# Patient Record
Sex: MALE | Race: WHITE | NOT HISPANIC OR LATINO | Employment: FULL TIME | URBAN - METROPOLITAN AREA
[De-identification: names, ages, dates, MRNs, and addresses within clinical notes are randomized per-mention and may not be internally consistent; named-entity substitution may affect disease eponyms.]

---

## 2017-03-10 DIAGNOSIS — R05.9 COUGH: Primary | ICD-10-CM

## 2017-03-10 RX ORDER — HYDROCODONE BITARTRATE AND HOMATROPINE METHYLBROMIDE ORAL SOLUTION 5; 1.5 MG/5ML; MG/5ML
LIQUID ORAL
Qty: 250 ML | Refills: 0 | Status: SHIPPED | OUTPATIENT
Start: 2017-03-10 | End: 2017-04-13 | Stop reason: SDUPTHER

## 2017-03-31 ENCOUNTER — HOSPITAL ENCOUNTER (OUTPATIENT)
Dept: RADIOLOGY | Facility: HOSPITAL | Age: 52
Discharge: HOME | End: 2017-03-31
Attending: INTERNAL MEDICINE
Payer: COMMERCIAL

## 2017-03-31 ENCOUNTER — OFFICE VISIT (OUTPATIENT)
Dept: INTERNAL MEDICINE | Facility: CLINIC | Age: 52
End: 2017-03-31
Payer: COMMERCIAL

## 2017-03-31 VITALS
SYSTOLIC BLOOD PRESSURE: 130 MMHG | HEIGHT: 71 IN | HEART RATE: 68 BPM | DIASTOLIC BLOOD PRESSURE: 78 MMHG | WEIGHT: 256.38 LBS | BODY MASS INDEX: 35.89 KG/M2

## 2017-03-31 DIAGNOSIS — R05.8 ALLERGIC COUGH: ICD-10-CM

## 2017-03-31 DIAGNOSIS — R05.8 ALLERGIC COUGH: Primary | ICD-10-CM

## 2017-03-31 PROCEDURE — 71020 XR CHEST PA AND LATERAL: CPT | Mod: TC,PO

## 2017-03-31 PROCEDURE — 71020 XR CHEST PA AND LATERAL: CPT | Mod: 26,,, | Performed by: RADIOLOGY

## 2017-03-31 PROCEDURE — 99214 OFFICE O/P EST MOD 30 MIN: CPT | Mod: S$GLB,,, | Performed by: INTERNAL MEDICINE

## 2017-03-31 PROCEDURE — 99999 PR PBB SHADOW E&M-EST. PATIENT-LVL III: CPT | Mod: PBBFAC,,, | Performed by: INTERNAL MEDICINE

## 2017-03-31 RX ORDER — PREDNISONE 20 MG/1
TABLET ORAL
Qty: 18 TABLET | Refills: 0 | Status: SHIPPED | OUTPATIENT
Start: 2017-03-31 | End: 2017-04-28

## 2017-03-31 NOTE — PROGRESS NOTES
REASON FOR VISIT:  The patient is a 51-year-old male who for about five weeks   has been having a cough.  The cough is actually nonproductive.  He has not been   sick, maybe some nasal congestion at times for which he takes Zyrtec, but there   is no shortness of breath or wheezing, heartburn, ingestion, sore throat, ear   pain or fever.  It will be a little bit more prominent when he exerts himself or   when lying down.  It does not come on with eating.  He had a rash in January 2016.  He was seen for bronchial condition with persistent cough and prednisone   was given.    PAST MEDICAL HISTORY:  No major health conditions.    MEDICATIONS:  Actually, he does have a prescription for Hycodan right now to use   to help with the cough.    PHYSICAL EXAMINATION:  VITAL SIGNS:  Per EPIC.  HEENT:  Tympanic membranes normal.  Nasal mucosa is clear.  Oropharynx, no   abnormal findings.  LUNGS:  Clear breath sounds with inspiration and expiration.  HEART:  Regular rate and rhythm.    IMPRESSION:  Allergic cough.    PLAN:    Chest x-ray and prednisone over nine days.    If there is no improvement, we may need to consider inhaler.  Referral to Pulmonology.        /tata [id number] review        IKER/JERARDO  dd: 03/31/2017 16:10:09 (CDT)  td: 04/01/2017 12:57:07 (CDT)  Doc ID   #[id number]  Job ID #[id number]    CC:

## 2017-03-31 NOTE — MR AVS SNAPSHOT
[facility] [address]  [address]  [address]  [facility] [address]  Phone: [phone]                  Naldo Barakat   3/31/2017 3:00 PM   Office Visit    Description:  Male : 1965   Provider:  Anthony Wyman MD   Department:  [facility] [address]           Reason for Visit     Cough           Diagnoses this Visit        Comments    Allergic cough    -  Primary            To Do List           Goals (5 Years of Data)     None       These Medications        Disp Refills Start End    predniSONE (DELTASONE) 20 MG tablet 18 tablet 0 3/31/2017     3 tablets po daily for 3 days, then 2 tablets po daily for 3 days, then 1 tablets po daily for 3 days    Pharmacy: [facility] - [place], [place] - [address] Ph #: [phone]         [facility]     [facility] Nurse Care Line -  Assistance  Unless otherwise directed by your provider, please contact [facility], our nurse care line that is available for  assistance.     Registered nurses in the [facility] Center provide: appointment scheduling, clinical advisement, health education, and other advisory services.  Call: [phone] (toll free)               Medications           Message regarding Medications     Verify the changes and/or additions to your medication regime listed below are the same as discussed with your clinician today.  If any of these changes or additions are incorrect, please notify your healthcare provider.        START taking these NEW medications        Refills    predniSONE (DELTASONE) 20 MG tablet 0    Sig: 3 tablets po daily for 3 days, then 2 tablets po daily for 3 days, then 1 tablets po daily for 3 days    Class: Normal           Verify that the below list of medications is an accurate representation of the medications you are currently taking.  If none reported, the list may be blank. If incorrect, please contact your  "healthcare provider. Carry this list with you in case of emergency.           Current Medications     hydrocodone-homatropine 5-1.5 mg/5 ml (HYCODAN) 5-1.5 mg/5 mL Syrp Take one or two teaspoons every four hours for cough    clobetasol (TEMOVATE) 0.05 % cream Apply topically 2 (two) times daily. On rash    hydrOXYzine HCl (ATARAX) 25 MG tablet Take 1 tablet (25 mg total) by mouth every evening.    nystatin-triamcinolone (MYCOLOG II) cream Apply topically 2 (two) times daily.    predniSONE (DELTASONE) 20 MG tablet 3 tablets po daily for 3 days, then 2 tablets po daily for 3 days, then 1 tablets po daily for 3 days           Clinical Reference Information           Your Vitals Were     BP Pulse Height Weight BMI    130/78 68 5' 11" (1.803 m) 116.3 kg (256 lb 6.4 oz) 35.76 kg/m2      Blood Pressure          Most Recent Value    BP  130/78      Allergies as of 3/31/2017     Erythromycin      Immunizations Administered on Date of Encounter - 3/31/2017     None      Orders Placed During Today's Visit     Future Labs/Procedures Expected by Expires    X-Ray Chest PA And Lateral  3/31/2017 3/31/2018      Language Assistance Services     ATTENTION: Language assistance services are available, free of charge. Please call [phone].      ATENCIÓN: Si habla carissa, tiene a burgess disposición servicios gratuitos de asistencia lingüística. Llame al [phone].     CHÚ Ý: N?u b?n nói Ti?ng Vi?t, có các d?ch v? h? tr? ngôn ng? mi?n phí dành cho b?n. G?i s? [phone].         [facility] [address] complies with applicable Federal civil rights laws and does not discriminate on the basis of race, color, national origin, age, disability, or sex.        "

## 2017-04-13 ENCOUNTER — PATIENT MESSAGE (OUTPATIENT)
Dept: INTERNAL MEDICINE | Facility: CLINIC | Age: 52
End: 2017-04-13

## 2017-04-13 DIAGNOSIS — R05.9 COUGH: ICD-10-CM

## 2017-04-13 RX ORDER — HYDROCODONE BITARTRATE AND HOMATROPINE METHYLBROMIDE ORAL SOLUTION 5; 1.5 MG/5ML; MG/5ML
LIQUID ORAL
Qty: 250 ML | Refills: 0 | Status: SHIPPED | OUTPATIENT
Start: 2017-04-13 | End: 2017-04-28

## 2017-04-19 ENCOUNTER — PATIENT MESSAGE (OUTPATIENT)
Dept: INTERNAL MEDICINE | Facility: CLINIC | Age: 52
End: 2017-04-19

## 2017-04-19 DIAGNOSIS — Z00.00 ANNUAL PHYSICAL EXAM: Primary | ICD-10-CM

## 2017-04-19 DIAGNOSIS — Z12.5 SCREENING FOR PROSTATE CANCER: ICD-10-CM

## 2017-04-21 ENCOUNTER — LAB VISIT (OUTPATIENT)
Dept: LAB | Facility: HOSPITAL | Age: 52
End: 2017-04-21
Attending: INTERNAL MEDICINE
Payer: COMMERCIAL

## 2017-04-21 DIAGNOSIS — Z00.00 ANNUAL PHYSICAL EXAM: ICD-10-CM

## 2017-04-21 DIAGNOSIS — Z12.5 SCREENING FOR PROSTATE CANCER: ICD-10-CM

## 2017-04-21 LAB
ALBUMIN SERPL BCP-MCNC: 4.1 G/DL
ALP SERPL-CCNC: 69 U/L
ALT SERPL W/O P-5'-P-CCNC: 28 U/L
ANION GAP SERPL CALC-SCNC: 6 MMOL/L
AST SERPL-CCNC: 30 U/L
BASOPHILS # BLD AUTO: 0.02 K/UL
BASOPHILS NFR BLD: 0.4 %
BILIRUB SERPL-MCNC: 1 MG/DL
BUN SERPL-MCNC: 17 MG/DL
CALCIUM SERPL-MCNC: 9.7 MG/DL
CHLORIDE SERPL-SCNC: 107 MMOL/L
CHOLEST/HDLC SERPL: 4.5 {RATIO}
CO2 SERPL-SCNC: 28 MMOL/L
COMPLEXED PSA SERPL-MCNC: 0.87 NG/ML
CREAT SERPL-MCNC: 1.2 MG/DL
DIFFERENTIAL METHOD: ABNORMAL
EOSINOPHIL # BLD AUTO: 0.1 K/UL
EOSINOPHIL NFR BLD: 1.7 %
ERYTHROCYTE [DISTWIDTH] IN BLOOD BY AUTOMATED COUNT: 12.8 %
EST. GFR  (AFRICAN AMERICAN): >60 ML/MIN/1.73 M^2
EST. GFR  (NON AFRICAN AMERICAN): >60 ML/MIN/1.73 M^2
GLUCOSE SERPL-MCNC: 99 MG/DL
HCT VFR BLD AUTO: 41.6 %
HDL/CHOLESTEROL RATIO: 22.2 %
HDLC SERPL-MCNC: 207 MG/DL
HDLC SERPL-MCNC: 46 MG/DL
HGB BLD-MCNC: 14.8 G/DL
LDLC SERPL CALC-MCNC: 144.8 MG/DL
LYMPHOCYTES # BLD AUTO: 1.6 K/UL
LYMPHOCYTES NFR BLD: 34.6 %
MCH RBC QN AUTO: 33.2 PG
MCHC RBC AUTO-ENTMCNC: 35.6 %
MCV RBC AUTO: 93 FL
MONOCYTES # BLD AUTO: 0.3 K/UL
MONOCYTES NFR BLD: 6.3 %
NEUTROPHILS # BLD AUTO: 2.6 K/UL
NEUTROPHILS NFR BLD: 57 %
NONHDLC SERPL-MCNC: 161 MG/DL
PLATELET # BLD AUTO: 229 K/UL
PMV BLD AUTO: 9.7 FL
POTASSIUM SERPL-SCNC: 4.6 MMOL/L
PROT SERPL-MCNC: 7.2 G/DL
RBC # BLD AUTO: 4.46 M/UL
SODIUM SERPL-SCNC: 141 MMOL/L
TRIGL SERPL-MCNC: 81 MG/DL
TSH SERPL DL<=0.005 MIU/L-ACNC: 1.18 UIU/ML
WBC # BLD AUTO: 4.62 K/UL

## 2017-04-21 PROCEDURE — 85025 COMPLETE CBC W/AUTO DIFF WBC: CPT

## 2017-04-21 PROCEDURE — 80061 LIPID PANEL: CPT

## 2017-04-21 PROCEDURE — 80053 COMPREHEN METABOLIC PANEL: CPT

## 2017-04-21 PROCEDURE — 84443 ASSAY THYROID STIM HORMONE: CPT

## 2017-04-21 PROCEDURE — 84153 ASSAY OF PSA TOTAL: CPT

## 2017-04-21 PROCEDURE — 36415 COLL VENOUS BLD VENIPUNCTURE: CPT

## 2017-04-28 ENCOUNTER — OFFICE VISIT (OUTPATIENT)
Dept: INTERNAL MEDICINE | Facility: CLINIC | Age: 52
End: 2017-04-28
Payer: COMMERCIAL

## 2017-04-28 VITALS
HEART RATE: 60 BPM | WEIGHT: 251.19 LBS | SYSTOLIC BLOOD PRESSURE: 122 MMHG | DIASTOLIC BLOOD PRESSURE: 84 MMHG | HEIGHT: 71 IN | BODY MASS INDEX: 35.17 KG/M2

## 2017-04-28 DIAGNOSIS — Z00.00 ANNUAL PHYSICAL EXAM: Primary | ICD-10-CM

## 2017-04-28 DIAGNOSIS — J30.9 ALLERGIC RHINITIS, UNSPECIFIED ALLERGIC RHINITIS TRIGGER, UNSPECIFIED RHINITIS SEASONALITY: ICD-10-CM

## 2017-04-28 DIAGNOSIS — E78.00 PURE HYPERCHOLESTEROLEMIA: ICD-10-CM

## 2017-04-28 PROCEDURE — 93005 ELECTROCARDIOGRAM TRACING: CPT | Mod: S$GLB,,, | Performed by: INTERNAL MEDICINE

## 2017-04-28 PROCEDURE — 93010 ELECTROCARDIOGRAM REPORT: CPT | Mod: S$GLB,,, | Performed by: INTERNAL MEDICINE

## 2017-04-28 PROCEDURE — 99396 PREV VISIT EST AGE 40-64: CPT | Mod: S$GLB,,, | Performed by: INTERNAL MEDICINE

## 2017-04-28 PROCEDURE — 99999 PR PBB SHADOW E&M-EST. PATIENT-LVL III: CPT | Mod: PBBFAC,,, | Performed by: INTERNAL MEDICINE

## 2017-04-28 NOTE — PROGRESS NOTES
Answers for HPI/ROS submitted by the patient on 2017   neck pain: No, No  unexpected weight change: No  hearing loss: No  rhinorrhea: No  trouble swallowing: No  eye discharge: No  visual disturbance: No  chest tightness: No  wheezing: No  chest pain: No  palpitations: No  blood in stool: No  constipation: No  vomiting: No  diarrhea: No  polydipsia: No  polyuria: No  difficulty urinating: No  urgency: No  hematuria: No  joint swelling: No  arthralgias: No  headaches: No  weakness: No  confusion: No  dysphoric mood: No    HISTORY:  A 51-year-old male comes in for regular routine visit.  Overall in   general, he has been feeling well.  A few weeks ago, he was seen for allergic   rhinitis and eventually took Zyrtec and felt better.    PAST MEDICAL HISTORY:  Allergic rhinitis.    SOCIAL HISTORY:  Tobacco use, none.  Alcohol use, very limited.  , has   three children.  Currently no formal exercise routine.    FAMILY HISTORY:  Mother is , had lung cancer and breast cancer.  Father   is , liver cancer.  Two sisters and one brother, no health conditions.    MEDICATIONS:  No regular medicines.    SCREENING:  Colonoscopy 02/15/2016 was normal.    RECENT LABS:  Cholesterol was a little bit up at 207, HDL 45, .  PSA,   TSH, chemistry normal.  CBC was fine with hematocrit 41 and MCV 93, but 10   months prior, it was 45 and MCV 99.    REVIEW OF SYMPTOMS:  He has no chest pain, palpitations, shortness of breath or   abdominal pain.  Bowel function is regular.  No difficulty urinating.  Nocturia   x1.  No arthralgias, headaches or heartburn.  Last time when he was here a few   weeks ago, when he was having a lot of coughing, at one time he felt tingling   going down his arms.    PHYSICAL EXAMINATION:  VITAL SIGNS:  Weight is 251 pounds, pulse 60, blood pressure 122/80.  HEENT:  Tympanic membranes normal.  Nasal mucosa is clear.  Oropharynx, no   abnormal findings.  NECK:  No thyromegaly.  LUNGS:   Clear.  HEART:  Regular rate and rhythm.  ABDOMEN:  Active bowel sounds, soft, nontender.  No hepatosplenomegaly or   abdominal masses.  PULSES:  2+ carotid, 2+ pedal pulses.  EXTREMITIES:  No edema.  LYMPH GLAND:  No palpable adenopathy.  GENITALIA:  No scrotal masses, no hernias.  RECTAL:  Stool is brown and heme-negative.  Prostate minimally enlarged.    IMPRESSION:  1.  General examination.  2.  Mild hyperlipidemia.  3.  Allergic rhinitis.    PLAN:    Continue with attention to proper diet and physical activity.   Recommended repeating lipid profile and repeat also the CBC again in three and   six months.    Proper eating habits were discussed.  Let me know if there are any acute issues.          /tata [id number] review        IKER/HN  dd: 04/28/2017 10:43:35 (CDT)  td: 04/29/2017 05:42:26 (CDT)  Doc ID   #[id number]  Job ID #[id number]    CC:

## 2017-04-28 NOTE — MR AVS SNAPSHOT
[facility] [address]  [address]  [address]  [facility] [address]  Phone: [phone]                  Naldo Barakat   2017 10:00 AM   Office Visit    Description:  Male : 1965   Provider:  Anthony Wyman MD   Department:  [facility] [address]           Reason for Visit     Annual Exam           Diagnoses this Visit        Comments    Annual physical exam    -  Primary     Pure hypercholesterolemia         Allergic rhinitis, unspecified allergic rhinitis trigger, unspecified rhinitis seasonality                To Do List           Goals (5 Years of Data)     None      Follow-Up and Disposition     Follow-up and Disposition History      [facility]     [facility] Nurse Care Line -  Assistance  Unless otherwise directed by your provider, please contact [facility], our nurse care line that is available for  assistance.     Registered nurses in the [facility] Call Center provide: appointment scheduling, clinical advisement, health education, and other advisory services.  Call: [phone] (toll free)               Medications           Message regarding Medications     Verify the changes and/or additions to your medication regime listed below are the same as discussed with your clinician today.  If any of these changes or additions are incorrect, please notify your healthcare provider.        STOP taking these medications     predniSONE (DELTASONE) 20 MG tablet 3 tablets po daily for 3 days, then 2 tablets po daily for 3 days, then 1 tablets po daily for 3 days    clobetasol (TEMOVATE) 0.05 % cream Apply topically 2 (two) times daily. On rash    hydrocodone-homatropine 5-1.5 mg/5 ml (HYCODAN) 5-1.5 mg/5 mL Syrp Take one or two teaspoons every four hours for cough    hydrOXYzine HCl (ATARAX) 25 MG tablet Take 1 tablet (25 mg total) by mouth every evening.    nystatin-triamcinolone (MYCOLOG II) cream Apply topically 2 (two) times  "daily.           Verify that the below list of medications is an accurate representation of the medications you are currently taking.  If none reported, the list may be blank. If incorrect, please contact your healthcare provider. Carry this list with you in case of emergency.           Current Medications            Clinical Reference Information           Your Vitals Were     BP Pulse Height Weight BMI    122/84 60 5' 11" (1.803 m) 113.9 kg (251 lb 3.2 oz) 35.04 kg/m2      Blood Pressure          Most Recent Value    BP  122/84      Allergies as of 4/28/2017     Erythromycin    Macrolide Antibiotics      Immunizations Administered on Date of Encounter - 4/28/2017     None      Orders Placed During Today's Visit     Future Labs/Procedures Expected by Expires    EKG 12-lead  4/28/2017 4/28/2018    CBC auto differential  10/29/2017 12/27/2017    Lipid panel  10/29/2017 4/28/2018      Language Assistance Services     ATTENTION: Language assistance services are available, free of charge. Please call [phone].      ATENCIÓN: Si habla español, tiene a burgess disposición servicios gratuitos de asistencia lingüística. Llame al [phone].     CHÚ Ý: N?u b?n nói Ti?ng Vi?t, có các d?ch v? h? tr? ngôn ng? mi?n phí dành cho b?n. G?i s? [phone].         [facility] [address] complies with applicable Federal civil rights laws and does not discriminate on the basis of race, color, national origin, age, disability, or sex.        "

## 2017-10-09 ENCOUNTER — PATIENT MESSAGE (OUTPATIENT)
Dept: INTERNAL MEDICINE | Facility: CLINIC | Age: 52
End: 2017-10-09

## 2017-10-11 ENCOUNTER — LAB VISIT (OUTPATIENT)
Dept: LAB | Facility: HOSPITAL | Age: 52
End: 2017-10-11
Attending: INTERNAL MEDICINE
Payer: COMMERCIAL

## 2017-10-11 DIAGNOSIS — E78.00 PURE HYPERCHOLESTEROLEMIA: ICD-10-CM

## 2017-10-11 LAB
BASOPHILS # BLD AUTO: 0.03 K/UL
BASOPHILS NFR BLD: 0.6 %
CHOLEST SERPL-MCNC: 166 MG/DL
CHOLEST/HDLC SERPL: 3.9 {RATIO}
DIFFERENTIAL METHOD: ABNORMAL
EOSINOPHIL # BLD AUTO: 0.1 K/UL
EOSINOPHIL NFR BLD: 2.1 %
ERYTHROCYTE [DISTWIDTH] IN BLOOD BY AUTOMATED COUNT: 12.5 %
HCT VFR BLD AUTO: 42.9 %
HDLC SERPL-MCNC: 43 MG/DL
HDLC SERPL: 25.9 %
HGB BLD-MCNC: 14.6 G/DL
LDLC SERPL CALC-MCNC: 110 MG/DL
LYMPHOCYTES # BLD AUTO: 1.3 K/UL
LYMPHOCYTES NFR BLD: 27.1 %
MCH RBC QN AUTO: 32.8 PG
MCHC RBC AUTO-ENTMCNC: 34 G/DL
MCV RBC AUTO: 96 FL
MONOCYTES # BLD AUTO: 0.4 K/UL
MONOCYTES NFR BLD: 8.9 %
NEUTROPHILS # BLD AUTO: 3 K/UL
NEUTROPHILS NFR BLD: 61.3 %
NONHDLC SERPL-MCNC: 123 MG/DL
PLATELET # BLD AUTO: 259 K/UL
PMV BLD AUTO: 9.8 FL
RBC # BLD AUTO: 4.45 M/UL
TRIGL SERPL-MCNC: 65 MG/DL
WBC # BLD AUTO: 4.84 K/UL

## 2017-10-11 PROCEDURE — 85025 COMPLETE CBC W/AUTO DIFF WBC: CPT

## 2017-10-11 PROCEDURE — 80061 LIPID PANEL: CPT

## 2017-10-11 PROCEDURE — 36415 COLL VENOUS BLD VENIPUNCTURE: CPT

## 2017-12-11 ENCOUNTER — OFFICE VISIT (OUTPATIENT)
Dept: OPTOMETRY | Facility: CLINIC | Age: 52
End: 2017-12-11
Payer: COMMERCIAL

## 2017-12-11 DIAGNOSIS — Z01.00 ROUTINE EYE EXAM: Primary | ICD-10-CM

## 2017-12-11 DIAGNOSIS — H52.13 MYOPIA OF BOTH EYES: ICD-10-CM

## 2017-12-11 PROCEDURE — 92004 COMPRE OPH EXAM NEW PT 1/>: CPT | Mod: S$GLB,,, | Performed by: OPTOMETRIST

## 2017-12-11 PROCEDURE — 99999 PR PBB SHADOW E&M-EST. PATIENT-LVL II: CPT | Mod: PBBFAC,,, | Performed by: OPTOMETRIST

## 2017-12-11 PROCEDURE — 92015 DETERMINE REFRACTIVE STATE: CPT | Mod: S$GLB,,, | Performed by: OPTOMETRIST

## 2017-12-11 RX ORDER — LORATADINE 10 MG/1
10 TABLET ORAL DAILY
COMMUNITY
End: 2018-10-22 | Stop reason: CLARIF

## 2017-12-11 RX ORDER — NAPROXEN SODIUM 220 MG
220 TABLET ORAL
COMMUNITY
End: 2018-10-22 | Stop reason: CLARIF

## 2017-12-11 NOTE — PROGRESS NOTES
HPI     Last eye exam was approximately 4 years ago.  Patient states lost glasses 3 years ago-only wore them in   meeting/boardroom to see things at a distance. Can see if sits close but   would like an updated glasses rx today. No near/computer vision   complaints. Occasionally will have ocular migraines but hasn't had any in   a while.   Patient denies diplopia, headaches, flashes/floaters, and pain.      Last edited by Fide Hammonds on 12/11/2017  9:47 AM. (History)            Assessment /Plan     For exam results, see Encounter Report.    Routine eye exam    Myopia of both eyes            1-2.  Distance rx given.  Eye health normal OU.   RTC 2 years for routine exam.

## 2018-03-20 RX ORDER — HYDROCODONE BITARTRATE AND HOMATROPINE METHYLBROMIDE ORAL SOLUTION 5; 1.5 MG/5ML; MG/5ML
LIQUID ORAL
Qty: 250 ML | Refills: 0 | Status: SHIPPED | OUTPATIENT
Start: 2018-03-20 | End: 2018-10-22 | Stop reason: CLARIF

## 2018-10-19 DIAGNOSIS — K04.7 TOOTH INFECTION: ICD-10-CM

## 2018-10-19 RX ORDER — AMOXICILLIN 500 MG/1
500 CAPSULE ORAL EVERY 8 HOURS
Qty: 24 CAPSULE | Refills: 0 | Status: ON HOLD | OUTPATIENT
Start: 2018-10-19 | End: 2018-11-08 | Stop reason: HOSPADM

## 2018-10-22 ENCOUNTER — OFFICE VISIT (OUTPATIENT)
Dept: INTERNAL MEDICINE | Facility: CLINIC | Age: 53
End: 2018-10-22
Payer: COMMERCIAL

## 2018-10-22 VITALS
WEIGHT: 235.88 LBS | HEIGHT: 71 IN | HEART RATE: 51 BPM | DIASTOLIC BLOOD PRESSURE: 80 MMHG | SYSTOLIC BLOOD PRESSURE: 131 MMHG | BODY MASS INDEX: 33.02 KG/M2 | OXYGEN SATURATION: 98 %

## 2018-10-22 DIAGNOSIS — K40.90 NON-RECURRENT UNILATERAL INGUINAL HERNIA WITHOUT OBSTRUCTION OR GANGRENE: Primary | ICD-10-CM

## 2018-10-22 PROCEDURE — 3008F BODY MASS INDEX DOCD: CPT | Mod: CPTII,S$GLB,, | Performed by: INTERNAL MEDICINE

## 2018-10-22 PROCEDURE — 99213 OFFICE O/P EST LOW 20 MIN: CPT | Mod: S$GLB,,, | Performed by: INTERNAL MEDICINE

## 2018-10-22 PROCEDURE — 99999 PR PBB SHADOW E&M-EST. PATIENT-LVL III: CPT | Mod: PBBFAC,,, | Performed by: INTERNAL MEDICINE

## 2018-10-22 NOTE — PROGRESS NOTES
REASON FOR VISIT:  This is a 53-year-old male.  For three months, he has been   noticing a discomfort, achy feeling in his left pubic region, it kind of waxes   and wanes off and on, but today what prompted his visit he was walking, noted   that the area felt very hard and firm.  He does not recall any incident or any   activity that made this worse or caused this.    PAST MEDICAL HISTORY:  No major health conditions.    PHYSICAL EXAMINATION:  VITAL SIGNS:  Per EPIC.  ABDOMEN:  Active bowel sounds, soft, nontender.  When lying supine, I can feel a   little bit of soft tissue swelling in the left pubic region.  This is   definitely noted and tender to touch when he is standing up and doing the   Valsalva maneuver and can feel fullness in the left inguinal canal.  This is not   the case involving the right suprapubic or right inguinal canal.    IMPRESSION:  Left inguinal hernia.    PLAN:  Refer to Surgery for evaluation.        /tata [id number] review        IKER/JERARDO  dd: 10/22/2018 16:52:15 (CDT)  td: 10/23/2018 07:47:14 (CDT)  Doc ID   #[id number]  Job ID #[id number]    CC:

## 2018-10-23 ENCOUNTER — TELEPHONE (OUTPATIENT)
Dept: INTERNAL MEDICINE | Facility: CLINIC | Age: 53
End: 2018-10-23

## 2018-10-23 NOTE — TELEPHONE ENCOUNTER
----- Message from Hopedirk Gabriel sent at 10/23/2018  2:29 PM CDT -----  Contact: Pt Mobile [phone] or Home [phone]  Patient would like a call back in regards to speaking with someone in your office on yesterday when he came it to be seen for him having groin area pain. He said that he asked to get a referral for a general surgeon and he was told that the message would be related to you and that you will call him back with the referral information. He would like a call back to speak with you about it please.

## 2018-10-24 ENCOUNTER — OFFICE VISIT (OUTPATIENT)
Dept: SURGERY | Facility: CLINIC | Age: 53
End: 2018-10-24
Payer: COMMERCIAL

## 2018-10-24 VITALS
HEIGHT: 71 IN | DIASTOLIC BLOOD PRESSURE: 78 MMHG | TEMPERATURE: 98 F | HEART RATE: 58 BPM | BODY MASS INDEX: 33.79 KG/M2 | SYSTOLIC BLOOD PRESSURE: 133 MMHG | WEIGHT: 241.38 LBS

## 2018-10-24 DIAGNOSIS — K40.90 NON-RECURRENT INGUINAL HERNIA OF LEFT SIDE: Primary | ICD-10-CM

## 2018-10-24 PROCEDURE — 3008F BODY MASS INDEX DOCD: CPT | Mod: CPTII,S$GLB,, | Performed by: SURGERY

## 2018-10-24 PROCEDURE — 99999 PR PBB SHADOW E&M-EST. PATIENT-LVL V: CPT | Mod: PBBFAC,,, | Performed by: SURGERY

## 2018-10-24 PROCEDURE — 99203 OFFICE O/P NEW LOW 30 MIN: CPT | Mod: S$GLB,,, | Performed by: SURGERY

## 2018-10-24 NOTE — LETTER
October 26, 2018      Anthony Wyman MD  [address]  [place] [address]           [facility]  [address]  [place] [address]  Phone: [phone]          Patient: Naldo Barakat   MR Number: [id number]   YOB: 1965   Date of Visit: 10/24/2018       Dear Dr. Anthony Wyman:    Thank you for referring Naldo Barakat to me for evaluation. Attached you will find relevant portions of my assessment and plan of care.    If you have questions, please do not hesitate to call me. I look forward to following Naldo Barakat along with you.    Sincerely,    Benjamín Daniel MD    Enclosure  CC:  No Recipients    If you would like to receive this communication electronically, please contact [email or web address] or [phone] to request more information on [employer] access.    For providers and/or their staff who would like to refer a patient to [facility], please contact us through our one-stop-shop provider referral line, [facility], at [phone].    If you feel you have received this communication in error or would no longer like to receive these types of communications, please e-mail [email or web address]

## 2018-10-24 NOTE — PATIENT INSTRUCTIONS
What Is a Hernia?    A hernia is when an organ or tissue pushes through a weak area in the belly (abdominal) wall. This weak area may be present at birth. Or it may be caused by abdominal strain over time. If not treated, a hernia can get worse with time and physical stress.  When a bulge forms  When there is a weak area in the abdominal wall, an organ or tissue can push outward. This often causes a bulge that you can see under your skin. The bulge may get bigger when you stand up. It may go away when you lie down. You may also feel some pressure or mild pain when lifting, coughing, urinating, or doing other activities.  Types of hernias  The type of hernia you have depends on its location. Most hernias form in the groin at or near the internal ring. This is the entrance to a canal between the abdomen and groin. Hernias can also occur in the abdomen, thigh, or genitals.  · An incisional hernia occurs at the site of a previous surgical incision.  · An umbilical hernia occurs at the navel.  · An indirect inguinal hernia occurs in the groin at the internal ring.  · A direct inguinal hernia occurs in the groin near the internal ring.  · A femoral hernia occurs just below the groin.  · An epigastric hernia occurs in the upper abdomen at the midline.  Diagnosis  In most cases, your healthcare provider can diagnose a hernia by doing a physical exam.  In some cases it might not be clear why you have a swelling in the belly wall. Then your provider may order an imaging test such as an ultrasound. This can help with the diagnosis.  Surgery  A hernia will not heal on its own. Surgery is needed to fix the weak spot in the abdominal wall. If not treated, a hernia can get larger. It can also cause serious health problems. The good news is that hernia surgery can be done quickly and safely. In some cases, you can go home the same day as your surgery.   When to call your provider  Call your healthcare provider right away if the  swelling around your hernia becomes larger, firmer, or more painful. These may be signs that your intestines are stuck in the abdominal wall. This is an emergency. The hernia must be repaired right away to avoid serious problems.  Date Last Reviewed: 7/1/2016  © 3132-2459 [employer]. [address] [place] [address]. All rights reserved. This information is not intended as a substitute for professional medical care. Always follow your healthcare professional's instructions.        How a Hernia Develops  Although a hernia bulge may appear suddenly, hernias often take years to develop. They grow larger as pressure inside the body presses the intestines or other tissues out through a weak area in the abdominal wall, often at the belly button or a site of previous surgery. With time, these tissues can bulge out beneath the skin.  Stages of hernia development    The wall weakens or tears. The abdominal lining bulges out through a weak area and begins to form a hernia sac. The sac may contain fat, intestine, or other tissues. At this point, the hernia may or may not cause a visible bulge.        The intestine pushes into the sac. As the intestine pushes further into the sac, it forms a visible bulge. The bulge may flatten when you lie down or push against it. This is called a reducible hernia and does not cause any immediate danger.             The intestine may become trapped. The sac containing the intestine may become trapped by muscle (incarcerated). If this happens, you wont be able to flatten the bulge. You may also have pain. Prompt treatment is needed.        The intestine may become strangulated. If the intestine is tightly trapped, it becomes strangulated. The strangulated area loses blood supply and may die. This can cause severe pain and block the intestine. Emergency surgery is needed.   Date Last Reviewed: 8/1/2016  © 8850-5406 [employer]. [address] [place] [address],  [place] [address]. All rights reserved. This information is not intended as a substitute for professional medical care. Always follow your healthcare professional's instructions.        Having Hernia Surgery: Patch Repair  Surgery treats a hernia by repairing the weakness in the abdominal wall. An incision is made so the surgeon has a direct view of the hernia. The repair is then done through this incision (open surgery). To repair the defect, special mesh materials are used to patch the weak area and make a tension-free repair. Follow your healthcare providers advice on how to get ready for the procedure. You can usually go home the same day as your surgery. In some cases, though, you may need to stay in the hospital overnight.  Getting ready for surgery  Your healthcare provider will talk with you about preparing for surgery. Follow all the instructions youre given and be sure to:   · Tell your healthcare provider about any medicines, supplements, or herbs you take. This includes both prescription and over-the-counter items.  · Stop taking aspirin, ibuprofen, naproxen and other NSAIDs as directed.  · Arrange for an adult family member or friend to give you a ride home after surgery.  · Stop smoking. Smoking affects blood flow and can slow healing.  · Gently wash the surgical area the night before surgery.  · Follow any directions you are given for not eating or drinking before surgery.     Repair in Front           Repair in Back           Combination Repair      The day of surgery  Arrive at the hospital or surgical center at your scheduled time. Youll be asked to change into a patient gown. Youll then be given an IV to provide fluids and medicine. Shortly before surgery, an anesthesiologist or nurse anesthetist will talk with you. He or she will explain the types of anesthesia used to prevent pain during surgery. You will have one or more of the following:  · Monitored sedation to make you relaxed and  sleepy.  · Local anesthesia to numb the surgical site.  · Regional anesthesia to numb specific areas of your body.  · General anesthesia to let you sleep during surgery.  During the surgery  Most hernias are treated using tension-free repairs. This is surgery that uses special mesh materials to repair the weak area. Unlike traditional repairs, the abdominal fascia (tissue around the muscle that gives strength to the abdominal wall) isnt sewn together. Instead, the mesh covers the weak area like a patch. This repairs the defect without tension on the muscles. It also makes it less likely to happen again. The mesh is made of strong, flexible plastic that stays in the body. Over time, nearby tissues grow into the mesh to strengthen the repair.  After surgery  When the procedure is over, youll be taken to the recovery area to rest. Your blood pressure and heart rate will be monitored. Youll also have a bandage over the surgical site. To help reduce discomfort, youll be given pain medicines. You may also be given breathing exercises to keep your lungs clear. Later, youll be asked to get up and walk. This helps prevent blood clots in the legs. You can go home when your healthcare provider says youre ready.     Risks and complications  Hernia surgery is safe, but does have risks including:  · Bleeding  · Infection  · Anesthesia risks  · Mesh complications  · Inability to urinate   · Bowel or bladder injury   · Numbness or pain in the groin or leg  · Risk the hernia will happen again  · Damage to the testicles or testicular function      Date Last Reviewed: 10/1/2016  © 0114-8293 [employer]. [address] [place] [address], [place] [address]. All rights reserved. This information is not intended as a substitute for professional medical care. Always follow your healthcare professional's instructions.

## 2018-10-26 NOTE — H&P (VIEW-ONLY)
History & Physical    SUBJECTIVE:     History of Present Illness:  Patient is a 53 y.o. male presents with left gron bulge associated with pain. Onset of symptoms was gradual starting several months ago with gradually worsening course since that time. Patient denies obstructive symptoms. Symptoms are aggravated by activity. Symptoms improve with rest.      Chief Complaint   Patient presents with    Consult    Hernia       Review of patient's allergies indicates:   Allergen Reactions    Erythromycin     Macrolide antibiotics        Current Outpatient Medications   Medication Sig Dispense Refill    amoxicillin (AMOXIL) 500 MG capsule Take 1 capsule (500 mg total) by mouth every 8 (eight) hours. 24 capsule 0     No current facility-administered medications for this visit.        Past Medical History:   Diagnosis Date    Allergy      Past Surgical History:   Procedure Laterality Date    COLONOSCOPY N/A 2/15/2016    Procedure: COLONOSCOPY;  Surgeon: STONE Sanchez MD;  Location: [facility] ([address] [place] [address]);  Service: Endoscopy;  Laterality: N/A;  PM Prep    COLONOSCOPY N/A 2/15/2016    Performed by STONE Sanchez MD at [facility] ([address] [place] [address])     Family History   Problem Relation Age of Onset    Cancer Mother         lung    Breast cancer Mother     Cancer Father         liver    No Known Problems Sister     No Known Problems Brother     No Known Problems Sister      Social History     Tobacco Use    Smoking status: Never Smoker   Substance Use Topics    Alcohol use: Yes     Comment: limited    Drug use: Not on file        Review of Systems:      I have reviewed the Patient Summary Reports.        Review of Systems  Social:  Non-Smoker    Hematology/Oncology:  Hematology Normal   Oncology Normal     Cardiovascular:  Cardiovascular Normal     Renal/:  Renal/ Normal     Hepatic/GI:  Hepatic/GI Normal    Musculoskeletal:  Musculoskeletal Normal    Neurological:  Neurology Normal    Endocrine:  Endocrine Normal   "  Dermatological:  Skin Normal    Psych:  Psychiatric Normal           OBJECTIVE:     Vital Signs (Most Recent)  Temp: 98.2 °F (36.8 °C) (10/24/18 1401)  Pulse: (!) 58 (10/24/18 1401)  BP: 133/78 (10/24/18 1401)  5' 11" (1.803 m)  109.5 kg (241 lb 6.5 oz)     Physical Exam:    Physical Exam  General:  Well nourished    Airway/Jaw/Neck:  AIRWAY FINDINGS: Normal      Eyes/Ears/Nose:  EYES/EARS/NOSE FINDINGS: Normal   Dental:  DENTAL FINDINGS: Normal   Chest/Lungs:  Chest/Lungs Clear    Heart/Vascular:  Heart Findings: Normal Heart murmur: negative    Abdomen:  Abdomen Findings:  Reducible left inguinal hernia     Musculoskeletal:  Musculoskeletal Findings: Normal   Skin:  Skin Findings: Normal    Mental Status:  Mental Status Findings: Normal        Laboratory  none    Diagnostic Results:  none    ASSESSMENT/PLAN:     Left inguinal hernia symptomatic    PLAN:Plan     Repair with mesh       "

## 2018-10-26 NOTE — PROGRESS NOTES
History & Physical    SUBJECTIVE:     History of Present Illness:  Patient is a 53 y.o. male presents with left gron bulge associated with pain. Onset of symptoms was gradual starting several months ago with gradually worsening course since that time. Patient denies obstructive symptoms. Symptoms are aggravated by activity. Symptoms improve with rest.      Chief Complaint   Patient presents with    Consult    Hernia       Review of patient's allergies indicates:   Allergen Reactions    Erythromycin     Macrolide antibiotics        Current Outpatient Medications   Medication Sig Dispense Refill    amoxicillin (AMOXIL) 500 MG capsule Take 1 capsule (500 mg total) by mouth every 8 (eight) hours. 24 capsule 0     No current facility-administered medications for this visit.        Past Medical History:   Diagnosis Date    Allergy      Past Surgical History:   Procedure Laterality Date    COLONOSCOPY N/A 2/15/2016    Procedure: COLONOSCOPY;  Surgeon: STONE Sanchez MD;  Location: [facility] ([address] [place] [address]);  Service: Endoscopy;  Laterality: N/A;  PM Prep    COLONOSCOPY N/A 2/15/2016    Performed by STONE Sanchez MD at [facility] ([address] [place] [address])     Family History   Problem Relation Age of Onset    Cancer Mother         lung    Breast cancer Mother     Cancer Father         liver    No Known Problems Sister     No Known Problems Brother     No Known Problems Sister      Social History     Tobacco Use    Smoking status: Never Smoker   Substance Use Topics    Alcohol use: Yes     Comment: limited    Drug use: Not on file        Review of Systems:      I have reviewed the Patient Summary Reports.        Review of Systems  Social:  Non-Smoker    Hematology/Oncology:  Hematology Normal   Oncology Normal     Cardiovascular:  Cardiovascular Normal     Renal/:  Renal/ Normal     Hepatic/GI:  Hepatic/GI Normal    Musculoskeletal:  Musculoskeletal Normal    Neurological:  Neurology Normal    Endocrine:  Endocrine Normal   "  Dermatological:  Skin Normal    Psych:  Psychiatric Normal           OBJECTIVE:     Vital Signs (Most Recent)  Temp: 98.2 °F (36.8 °C) (10/24/18 1401)  Pulse: (!) 58 (10/24/18 1401)  BP: 133/78 (10/24/18 1401)  5' 11" (1.803 m)  109.5 kg (241 lb 6.5 oz)     Physical Exam:    Physical Exam  General:  Well nourished    Airway/Jaw/Neck:  AIRWAY FINDINGS: Normal      Eyes/Ears/Nose:  EYES/EARS/NOSE FINDINGS: Normal   Dental:  DENTAL FINDINGS: Normal   Chest/Lungs:  Chest/Lungs Clear    Heart/Vascular:  Heart Findings: Normal Heart murmur: negative    Abdomen:  Abdomen Findings:  Reducible left inguinal hernia     Musculoskeletal:  Musculoskeletal Findings: Normal   Skin:  Skin Findings: Normal    Mental Status:  Mental Status Findings: Normal        Laboratory  none    Diagnostic Results:  none    ASSESSMENT/PLAN:     Left inguinal hernia symptomatic    PLAN:Plan     Repair with mesh       "

## 2018-11-01 DIAGNOSIS — K40.90 LEFT INGUINAL HERNIA: ICD-10-CM

## 2018-11-01 RX ORDER — SODIUM CHLORIDE 9 MG/ML
INJECTION, SOLUTION INTRAVENOUS CONTINUOUS
Status: CANCELLED | OUTPATIENT
Start: 2018-11-01

## 2018-11-07 ENCOUNTER — ANESTHESIA EVENT (OUTPATIENT)
Dept: SURGERY | Facility: HOSPITAL | Age: 53
End: 2018-11-07
Payer: COMMERCIAL

## 2018-11-07 ENCOUNTER — TELEPHONE (OUTPATIENT)
Dept: SURGERY | Facility: CLINIC | Age: 53
End: 2018-11-07

## 2018-11-08 ENCOUNTER — HOSPITAL ENCOUNTER (OUTPATIENT)
Facility: HOSPITAL | Age: 53
Discharge: HOME | End: 2018-11-08
Attending: SURGERY | Admitting: SURGERY
Payer: COMMERCIAL

## 2018-11-08 ENCOUNTER — ANESTHESIA (OUTPATIENT)
Dept: SURGERY | Facility: HOSPITAL | Age: 53
End: 2018-11-08
Payer: COMMERCIAL

## 2018-11-08 VITALS
RESPIRATION RATE: 18 BRPM | WEIGHT: 235 LBS | HEIGHT: 71 IN | BODY MASS INDEX: 32.9 KG/M2 | DIASTOLIC BLOOD PRESSURE: 95 MMHG | TEMPERATURE: 98 F | OXYGEN SATURATION: 100 % | SYSTOLIC BLOOD PRESSURE: 143 MMHG | HEART RATE: 58 BPM

## 2018-11-08 DIAGNOSIS — K40.90 LEFT INGUINAL HERNIA: Primary | ICD-10-CM

## 2018-11-08 PROCEDURE — 63600175 PHARM REV CODE 636 W HCPCS: Performed by: PHYSICIAN ASSISTANT

## 2018-11-08 PROCEDURE — 94761 N-INVAS EAR/PLS OXIMETRY MLT: CPT

## 2018-11-08 PROCEDURE — 71000015 HC POSTOP RECOV 1ST HR: Performed by: SURGERY

## 2018-11-08 PROCEDURE — 37000008 HC ANESTHESIA 1ST 15 MINUTES: Performed by: SURGERY

## 2018-11-08 PROCEDURE — 25000003 PHARM REV CODE 250: Performed by: NURSE ANESTHETIST, CERTIFIED REGISTERED

## 2018-11-08 PROCEDURE — 25000003 PHARM REV CODE 250: Performed by: SURGERY

## 2018-11-08 PROCEDURE — S0020 INJECTION, BUPIVICAINE HYDRO: HCPCS | Performed by: SURGERY

## 2018-11-08 PROCEDURE — 25000003 PHARM REV CODE 250: Performed by: ANESTHESIOLOGY

## 2018-11-08 PROCEDURE — D9220A PRA ANESTHESIA: Mod: ANES,,, | Performed by: ANESTHESIOLOGY

## 2018-11-08 PROCEDURE — 36000707: Performed by: SURGERY

## 2018-11-08 PROCEDURE — 27200651 HC AIRWAY, LMA: Performed by: NURSE ANESTHETIST, CERTIFIED REGISTERED

## 2018-11-08 PROCEDURE — 27000221 HC OXYGEN, UP TO 24 HOURS

## 2018-11-08 PROCEDURE — 49505 PRP I/HERN INIT REDUC >5 YR: CPT | Mod: LT,,, | Performed by: SURGERY

## 2018-11-08 PROCEDURE — 71000033 HC RECOVERY, INTIAL HOUR: Performed by: SURGERY

## 2018-11-08 PROCEDURE — 37000009 HC ANESTHESIA EA ADD 15 MINS: Performed by: SURGERY

## 2018-11-08 PROCEDURE — D9220A PRA ANESTHESIA: Mod: CRNA,,, | Performed by: NURSE ANESTHETIST, CERTIFIED REGISTERED

## 2018-11-08 PROCEDURE — 36000706: Performed by: SURGERY

## 2018-11-08 PROCEDURE — 71000016 HC POSTOP RECOV ADDL HR: Performed by: SURGERY

## 2018-11-08 PROCEDURE — 25000003 PHARM REV CODE 250: Performed by: PHYSICIAN ASSISTANT

## 2018-11-08 PROCEDURE — 88302 TISSUE EXAM BY PATHOLOGIST: CPT | Mod: 26,,, | Performed by: PATHOLOGY

## 2018-11-08 PROCEDURE — 88302 TISSUE EXAM BY PATHOLOGIST: CPT | Performed by: PATHOLOGY

## 2018-11-08 PROCEDURE — C1781 MESH (IMPLANTABLE): HCPCS | Performed by: SURGERY

## 2018-11-08 PROCEDURE — 63600175 PHARM REV CODE 636 W HCPCS: Performed by: NURSE ANESTHETIST, CERTIFIED REGISTERED

## 2018-11-08 DEVICE — MESH PARIETEX PROGRIP LEFT: Type: IMPLANTABLE DEVICE | Site: INGUINAL | Status: FUNCTIONAL

## 2018-11-08 RX ORDER — KETAMINE HCL IN 0.9 % NACL 50 MG/5 ML
SYRINGE (ML) INTRAVENOUS
Status: DISCONTINUED | OUTPATIENT
Start: 2018-11-08 | End: 2018-11-08

## 2018-11-08 RX ORDER — ROCURONIUM BROMIDE 10 MG/ML
INJECTION, SOLUTION INTRAVENOUS
Status: DISCONTINUED | OUTPATIENT
Start: 2018-11-08 | End: 2018-11-08

## 2018-11-08 RX ORDER — SUCCINYLCHOLINE CHLORIDE 20 MG/ML
INJECTION INTRAMUSCULAR; INTRAVENOUS
Status: DISCONTINUED | OUTPATIENT
Start: 2018-11-08 | End: 2018-11-08

## 2018-11-08 RX ORDER — ONDANSETRON 2 MG/ML
INJECTION INTRAMUSCULAR; INTRAVENOUS
Status: DISCONTINUED | OUTPATIENT
Start: 2018-11-08 | End: 2018-11-08

## 2018-11-08 RX ORDER — OXYCODONE HYDROCHLORIDE 5 MG/1
5 TABLET ORAL ONCE
Status: COMPLETED | OUTPATIENT
Start: 2018-11-08 | End: 2018-11-08

## 2018-11-08 RX ORDER — LIDOCAINE HCL/PF 100 MG/5ML
SYRINGE (ML) INTRAVENOUS
Status: DISCONTINUED | OUTPATIENT
Start: 2018-11-08 | End: 2018-11-08

## 2018-11-08 RX ORDER — DEXAMETHASONE SODIUM PHOSPHATE 4 MG/ML
INJECTION, SOLUTION INTRA-ARTICULAR; INTRALESIONAL; INTRAMUSCULAR; INTRAVENOUS; SOFT TISSUE
Status: DISCONTINUED | OUTPATIENT
Start: 2018-11-08 | End: 2018-11-08

## 2018-11-08 RX ORDER — OXYCODONE HYDROCHLORIDE 5 MG/1
5 TABLET ORAL
Status: DISCONTINUED | OUTPATIENT
Start: 2018-11-08 | End: 2018-11-08 | Stop reason: HOSPADM

## 2018-11-08 RX ORDER — CEFAZOLIN SODIUM 1 G/3ML
2 INJECTION, POWDER, FOR SOLUTION INTRAMUSCULAR; INTRAVENOUS
Status: COMPLETED | OUTPATIENT
Start: 2018-11-08 | End: 2018-11-08

## 2018-11-08 RX ORDER — SODIUM CHLORIDE 9 MG/ML
INJECTION, SOLUTION INTRAVENOUS CONTINUOUS
Status: DISCONTINUED | OUTPATIENT
Start: 2018-11-08 | End: 2018-11-08 | Stop reason: HOSPADM

## 2018-11-08 RX ORDER — FENTANYL CITRATE 50 UG/ML
INJECTION, SOLUTION INTRAMUSCULAR; INTRAVENOUS
Status: DISCONTINUED | OUTPATIENT
Start: 2018-11-08 | End: 2018-11-08

## 2018-11-08 RX ORDER — GLYCOPYRROLATE 0.2 MG/ML
INJECTION INTRAMUSCULAR; INTRAVENOUS
Status: DISCONTINUED | OUTPATIENT
Start: 2018-11-08 | End: 2018-11-08

## 2018-11-08 RX ORDER — ACETAMINOPHEN 10 MG/ML
INJECTION, SOLUTION INTRAVENOUS
Status: DISCONTINUED | OUTPATIENT
Start: 2018-11-08 | End: 2018-11-08

## 2018-11-08 RX ORDER — BUPIVACAINE HYDROCHLORIDE 5 MG/ML
INJECTION, SOLUTION EPIDURAL; INTRACAUDAL
Status: DISCONTINUED | OUTPATIENT
Start: 2018-11-08 | End: 2018-11-08 | Stop reason: HOSPADM

## 2018-11-08 RX ORDER — PROPOFOL 10 MG/ML
VIAL (ML) INTRAVENOUS
Status: DISCONTINUED | OUTPATIENT
Start: 2018-11-08 | End: 2018-11-08

## 2018-11-08 RX ORDER — HYDROCODONE BITARTRATE AND ACETAMINOPHEN 5; 325 MG/1; MG/1
1 TABLET ORAL EVERY 6 HOURS PRN
Qty: 10 TABLET | Refills: 0 | Status: SHIPPED | OUTPATIENT
Start: 2018-11-08 | End: 2019-03-04 | Stop reason: CLARIF

## 2018-11-08 RX ORDER — MIDAZOLAM HYDROCHLORIDE 1 MG/ML
INJECTION, SOLUTION INTRAMUSCULAR; INTRAVENOUS
Status: DISCONTINUED | OUTPATIENT
Start: 2018-11-08 | End: 2018-11-08

## 2018-11-08 RX ORDER — NEOSTIGMINE METHYLSULFATE 1 MG/ML
INJECTION, SOLUTION INTRAVENOUS
Status: DISCONTINUED | OUTPATIENT
Start: 2018-11-08 | End: 2018-11-08

## 2018-11-08 RX ADMIN — SUCCINYLCHOLINE CHLORIDE 140 MG: 20 INJECTION, SOLUTION INTRAMUSCULAR; INTRAVENOUS at 07:11

## 2018-11-08 RX ADMIN — ACETAMINOPHEN 1000 MG: 10 INJECTION, SOLUTION INTRAVENOUS at 07:11

## 2018-11-08 RX ADMIN — ONDANSETRON 4 MG: 2 INJECTION INTRAMUSCULAR; INTRAVENOUS at 07:11

## 2018-11-08 RX ADMIN — OXYCODONE HYDROCHLORIDE 5 MG: 5 TABLET ORAL at 08:11

## 2018-11-08 RX ADMIN — DEXAMETHASONE SODIUM PHOSPHATE 8 MG: 4 INJECTION, SOLUTION INTRAMUSCULAR; INTRAVENOUS at 07:11

## 2018-11-08 RX ADMIN — LIDOCAINE HYDROCHLORIDE 100 MG: 20 INJECTION, SOLUTION INTRAVENOUS at 07:11

## 2018-11-08 RX ADMIN — Medication 25 MG: at 07:11

## 2018-11-08 RX ADMIN — SODIUM CHLORIDE: 0.9 INJECTION, SOLUTION INTRAVENOUS at 07:11

## 2018-11-08 RX ADMIN — PROPOFOL 100 MG: 10 INJECTION, EMULSION INTRAVENOUS at 07:11

## 2018-11-08 RX ADMIN — ROCURONIUM BROMIDE 20 MG: 10 INJECTION, SOLUTION INTRAVENOUS at 07:11

## 2018-11-08 RX ADMIN — MIDAZOLAM HYDROCHLORIDE 2 MG: 1 INJECTION, SOLUTION INTRAMUSCULAR; INTRAVENOUS at 06:11

## 2018-11-08 RX ADMIN — GLYCOPYRROLATE 0.4 MG: 0.2 INJECTION, SOLUTION INTRAMUSCULAR; INTRAVENOUS at 07:11

## 2018-11-08 RX ADMIN — SODIUM CHLORIDE, SODIUM GLUCONATE, SODIUM ACETATE, POTASSIUM CHLORIDE, MAGNESIUM CHLORIDE, SODIUM PHOSPHATE, DIBASIC, AND POTASSIUM PHOSPHATE: .53; .5; .37; .037; .03; .012; .00082 INJECTION, SOLUTION INTRAVENOUS at 07:11

## 2018-11-08 RX ADMIN — CEFAZOLIN 2 G: 330 INJECTION, POWDER, FOR SOLUTION INTRAMUSCULAR; INTRAVENOUS at 07:11

## 2018-11-08 RX ADMIN — ROCURONIUM BROMIDE 5 MG: 10 INJECTION, SOLUTION INTRAVENOUS at 07:11

## 2018-11-08 RX ADMIN — NEOSTIGMINE METHYLSULFATE 3 MG: 1 INJECTION INTRAVENOUS at 07:11

## 2018-11-08 RX ADMIN — FENTANYL CITRATE 100 MCG: 50 INJECTION, SOLUTION INTRAMUSCULAR; INTRAVENOUS at 07:11

## 2018-11-08 RX ADMIN — OXYCODONE HYDROCHLORIDE 5 MG: 5 TABLET ORAL at 10:11

## 2018-11-08 RX ADMIN — PROPOFOL 200 MG: 10 INJECTION, EMULSION INTRAVENOUS at 07:11

## 2018-11-08 NOTE — ANESTHESIA PREPROCEDURE EVALUATION
11/08/2018  Naldo Barakat is a 53 y.o., male.  Pre-operative evaluation for Procedure(s) (LRB):  REPAIR, HERNIA, INGUINAL, WITHOUT HISTORY OF PRIOR REPAIR, AGE 5 YEARS OR OLDER Open Left with Mesh (Left)    Naldo Barakat is a 53 y.o. male       Active problems:    Prev airway:       Review of patient's allergies indicates:   Allergen Reactions    Erythromycin     Macrolide antibiotics         No current facility-administered medications on file prior to encounter.      Current Outpatient Medications on File Prior to Encounter   Medication Sig Dispense Refill    amoxicillin (AMOXIL) 500 MG capsule Take 1 capsule (500 mg total) by mouth every 8 (eight) hours. 24 capsule 0       Past Surgical History:   Procedure Laterality Date    COLONOSCOPY N/A 2/15/2016    Procedure: COLONOSCOPY;  Surgeon: STONE Sanchez MD;  Location: [facility] ([address] [place] [address]);  Service: Endoscopy;  Laterality: N/A;  PM Prep    COLONOSCOPY N/A 2/15/2016    Performed by STONE Sanchez MD at [facility] ([address] [place] [address])       Social History     Socioeconomic History    Marital status:      Spouse name: Not on file    Number of children: 2    Years of education: Not on file    Highest education level: Not on file   Social Needs    Financial resource strain: Not on file    Food insecurity - worry: Not on file    Food insecurity - inability: Not on file    Transportation needs - medical: Not on file    Transportation needs - non-medical: Not on file   Occupational History    Not on file   Tobacco Use    Smoking status: Never Smoker   Substance and Sexual Activity    Alcohol use: Yes     Comment: limited    Drug use: Not on file    Sexual activity: Not on file   Other Topics Concern    Not on file   Social History Narrative    Not on file         Vital Signs Range (Last 24H):  Wt Readings from Last 3 Encounters:   11/08/18 106.6 kg  (235 lb)   10/24/18 109.5 kg (241 lb 6.5 oz)   10/22/18 107 kg (235 lb 14.3 oz)     Temp Readings from Last 3 Encounters:   11/08/18 36.7 °C (98.1 °F) (Oral)   10/24/18 36.8 °C (98.2 °F)   05/02/16 36.8 °C (98.3 °F) (Oral)     BP Readings from Last 3 Encounters:   11/08/18 121/78   10/24/18 133/78   10/22/18 131/80     Pulse Readings from Last 3 Encounters:   11/08/18 (!) 56   10/24/18 (!) 58   10/22/18 (!) 51         CBC:   Lab Results   Component Value Date    WBC 4.84 10/11/2017    HGB 14.6 10/11/2017    HCT 42.9 10/11/2017    MCV 96 10/11/2017     10/11/2017       CMP: CMP  Sodium   Date Value Ref Range Status   04/21/2017 141 136 - 145 mmol/L Final     Potassium   Date Value Ref Range Status   04/21/2017 4.6 3.5 - 5.1 mmol/L Final     Chloride   Date Value Ref Range Status   04/21/2017 107 95 - 110 mmol/L Final     CO2   Date Value Ref Range Status   04/21/2017 28 23 - 29 mmol/L Final     Glucose   Date Value Ref Range Status   04/21/2017 99 70 - 110 mg/dL Final     BUN, Bld   Date Value Ref Range Status   04/21/2017 17 6 - 20 mg/dL Final     Creatinine   Date Value Ref Range Status   04/21/2017 1.2 0.5 - 1.4 mg/dL Final     Calcium   Date Value Ref Range Status   04/21/2017 9.7 8.7 - 10.5 mg/dL Final     Total Protein   Date Value Ref Range Status   04/21/2017 7.2 6.0 - 8.4 g/dL Final     Albumin   Date Value Ref Range Status   04/21/2017 4.1 3.5 - 5.2 g/dL Final     Total Bilirubin   Date Value Ref Range Status   04/21/2017 1.0 0.1 - 1.0 mg/dL Final     Comment:     For infants and newborns, interpretation of results should be based  on gestational age, weight and in agreement with clinical  observations.  Premature Infant recommended reference ranges:  Up to 24 hours.............<8.0 mg/dL  Up to 48 hours............<12.0 mg/dL  3-5 days..................<15.0 mg/dL  6-29 days.................<15.0 mg/dL       Alkaline Phosphatase   Date Value Ref Range Status   04/21/2017 69 55 - 135 U/L Final      AST   Date Value Ref Range Status   2017 30 10 - 40 U/L Final     ALT   Date Value Ref Range Status   2017 28 10 - 44 U/L Final     Anion Gap   Date Value Ref Range Status   2017 6 (L) 8 - 16 mmol/L Final     eGFR if    Date Value Ref Range Status   2017 >60.0 >60 mL/min/1.73 m^2 Final     eGFR if non    Date Value Ref Range Status   2017 >60.0 >60 mL/min/1.73 m^2 Final     Comment:     Calculation used to obtain the estimated glomerular filtration  rate (eGFR) is the CKD-EPI equation. Since race is unknown   in our information system, the eGFR values for   -American and Non--American patients are given   for each creatinine result.         INR  No results found for: INR, PROTIME        Diagnostic Studies:      EKD Echo:            Anesthesia Evaluation         Review of Systems  Anesthesia Hx:  Denies Family Hx of Anesthesia complications.   Denies Personal Hx of Anesthesia complications.   EENT/Dental:EENT/Dental Normal   Cardiovascular:  Cardiovascular Normal     Neurological:  Neurology Normal    Endocrine:  Endocrine Normal        Physical Exam  General:  Well nourished    Airway/Jaw/Neck:  Airway Findings: Mouth Opening: Normal Tongue: Normal  General Airway Assessment: Adult  Mallampati: I  Jaw/Neck Findings:  Neck ROM: Normal ROM      Dental:  Dental Findings: In tact   Chest/Lungs:  Chest/Lungs Findings: Clear to auscultation     Heart/Vascular:  Heart Findings: Rate: Normal  Rhythm: Regular Rhythm  Sounds: Normal        Mental Status:  Mental Status Findings:  Cooperative         Anesthesia Plan  Type of Anesthesia, risks & benefits discussed:  Anesthesia Type:  general  Patient's Preference:   Intra-op Monitoring Plan:   Intra-op Monitoring Plan Comments:   Post Op Pain Control Plan:   Post Op Pain Control Plan Comments:   Induction:   IV  Beta Blocker:  Patient is not currently on a Beta-Blocker (No further  documentation required).       Informed Consent: Patient understands risks and agrees with Anesthesia plan.  Questions answered. Anesthesia consent signed with patient.  ASA Score: 2     Day of Surgery Review of History & Physical:    H&P update referred to the surgeon.         Ready For Surgery From Anesthesia Perspective.

## 2018-11-08 NOTE — BRIEF OP NOTE
[facility]  Brief Operative Note     SUMMARY     Surgery Date: 11/8/2018     Surgeon(s) and Role:     * Benjamín Daniel MD - Primary     * Adry Squires MD - Resident - Assisting    Pre-op Diagnosis:  Non-recurrent inguinal hernia of left side [K40.90]    Post-op Diagnosis:  Post-Op Diagnosis Codes:     * Non-recurrent inguinal hernia of left side [K40.90]    Procedure(s) (LRB):  REPAIR, HERNIA, INGUINAL, WITHOUT HISTORY OF PRIOR REPAIR, AGE 5 YEARS OR OLDER Open Left with Mesh (Left)    Anesthesia: General    Description of the findings of the procedure: Left open inguinal hernia repair with mesh    Findings/Key Components:  Left indirect hernia with hernia sac- reducible. Placement of [id number] Progrip polyester mesh.     Estimated Blood Loss: <5cc       Specimens:   Specimen (12h ago, onward)    Start     Ordered    11/08/18 0732  Specimen to Pathology - Surgery  Once     Comments:  1. Hernia sac-permanent     Start Status   11/08/18 0732 Collected (11/08/18 0739)       11/08/18 0739        Discharge Note    SUMMARY     Admit Date: 11/8/2018    Discharge Date and Time:  11/08/2018 8:09 AM    Hospital Course Patient presented for scheduled procedure today. Tolerated the procedure well, without complication. Extubated in OR and transferred to recovery. Plan to discharge home today with follow up appointment in 2 weeks.       Final Diagnosis: Post-Op Diagnosis Codes:     * Non-recurrent inguinal hernia of left side [K40.90]    Disposition: Home or Self Care    Follow Up/Patient Instructions: see below     Medications:  Reconciled Home Medications:      Medication List      START taking these medications    HYDROcodone-acetaminophen 5-325 mg per tablet  Commonly known as:  NORCO  Take 1 tablet by mouth every 6 (six) hours as needed for Pain.        STOP taking these medications    amoxicillin 500 MG capsule  Commonly known as:  AMOXIL          Discharge Procedure Orders   Diet Adult Regular      Lifting restrictions   Order Comments: No heavy lifting (>10lbs) for 6 weeks after surgery.     Notify your health care provider if you experience any of the following:  increased confusion or weakness     Notify your health care provider if you experience any of the following:  persistent dizziness, light-headedness, or visual disturbances     Notify your health care provider if you experience any of the following:  worsening rash     Notify your health care provider if you experience any of the following:  severe persistent headache     Notify your health care provider if you experience any of the following:  difficulty breathing or increased cough     Notify your health care provider if you experience any of the following:  redness, tenderness, or signs of infection (pain, swelling, redness, odor or green/yellow discharge around incision site)     Notify your health care provider if you experience any of the following:  severe uncontrolled pain     Notify your health care provider if you experience any of the following:  temperature >100.4     Notify your health care provider if you experience any of the following:  persistent nausea and vomiting or diarrhea     Remove dressing in 48 hours     Activity as tolerated     Shower on day dressing removed (No bath)   Order Comments: Okay to take a shower in 48 hours after surgery once dressing removed. Can get incision wet- wash with soap and water. Do not soak/submerge incision (aka no bathing, swimming)until cleared in clinic.     Follow-up Information     Benjamín Daniel MD In 2 weeks.    Specialty:  General Surgery  Contact information:  [address]  [facility] [address]  [phone]                  RAMIREZ Squires MD   General Surgery- PGYII  [phone]

## 2018-11-08 NOTE — PLAN OF CARE
Patient and spouse state they are ready to be discharged. Instructions and narcotic prescription given to patient and family. Both verbalize understanding. Patient tolerating po liquids and urinating with no difficulty. Patient states pain is at a tolerable level for them. Anesthesia consent and surgical consent in chart upon patient's discharge from [facility].

## 2018-11-08 NOTE — TRANSFER OF CARE
"Anesthesia Transfer of Care Note    Patient: Naldo Barakat    Procedure(s) Performed: Procedure(s) (LRB):  REPAIR, HERNIA, INGUINAL, WITHOUT HISTORY OF PRIOR REPAIR, AGE 5 YEARS OR OLDER Open Left with Mesh (Left)    Patient location: PACU    Anesthesia Type: general    Transport from OR: Transported from OR on 6-10 L/min O2 by face mask with adequate spontaneous ventilation    Post pain: adequate analgesia    Post assessment: no apparent anesthetic complications    Post vital signs: stable    Level of consciousness: sedated, responds to stimulation and oriented    Nausea/Vomiting: no nausea/vomiting    Complications: none    Transfer of care protocol was followed      Last vitals:   Visit Vitals  BP (!) 155/97 (BP Location: Left arm, Patient Position: Lying)   Pulse 61   Temp 36.5 °C (97.7 °F) (Oral)   Resp 18   Ht 5' 11" (1.803 m)   Wt 106.6 kg (235 lb)   SpO2 99%   BMI 32.78 kg/m²     "

## 2018-11-08 NOTE — OP NOTE
[facility]  Surgery Department  Operative Note    SUMMARY     Date of Procedure: 11/8/2018     Procedure: Procedure(s) (LRB):  REPAIR, HERNIA, INGUINAL, WITHOUT HISTORY OF PRIOR REPAIR, AGE 3 YEARS OR OLDER Open Left with Mesh (Left)     Surgeon(s) and Role:     * Benjamín Daniel MD - Primary     * dAry Squires MD - Resident - Assisting    Pre-Operative Diagnosis: Non-recurrent inguinal hernia of left side [K40.90]    Post-Operative Diagnosis: Post-Op Diagnosis Codes:     * Non-recurrent inguinal hernia of left side [K40.90]    Anesthesia: General    Technical Procedures Used: Open inguinal hernia repair left with mesh    Procedure:The patient was identified in preoperative holding and brought back to the Operating Room. The patient was placed supine on the operating table and padded appropriately. Monitors were applied and monitored anesthesia care was initiated. His groin was shaved with electric clippers and prepped and draped in the standard sterile surgical fashion. A timeout was performed and all team members present agreed this was the correct procedure on the correct side of the correct patient. We also confirmed administration of   appropriate preoperative antibiotics.   We identified anatomical landmarks and marked out our left inguinal incision. A transverse incision was made with a scaple and the subcutaneous tissue was divided with Bovie electrocautery. This dissection was carried down through Janeen fascia down to the aponeurosis of the external oblique. The aponeurosis of the external oblique was incised in line with its fibers, first with a scalpel and then Metzenbaum scissors, and this incision was extended to the external ring. The inguinal canal contents were bluntly encircled, first digitally and then with a Penrose drain. We proceeded to identify a indirect inguinal hernia sac, which was carefully dissected away from the spermatic cord. We performed skeletonization of  the hernia sac and suture ligated at the base, after we ensured all contents were reduced. Specimen was sent to pathology. We then had appropriate borders of the inguinal canal identified and decided to repair the defect with a piece of [id number] Progrip polyester mesh. The tails of the mesh were brought together around the cord structures creating a new internal ring.The mesh was sewn in place with interrupted 2-0 vicryl on a CT1.  Inferomedially, the mesh was anchored to the fascia overlying the pubic tubercle. Laterally, the mesh was anchored to the shelving edge of the inguinal ligament. Superiomedially, the mesh was anchored to the conjoined tendon.The mesh was inspected and noted to lie in appropriate position without any redundancy or tension. The cord structures were pulled back down to the scrotum and there was noted to be no tension. The aponeurosis of the external oblique was closed with a running 3-0 Vicryl suture. Janeen fascia was closed with several buried interrupted 3-0 Vicryl sutures and the   skin was closed with a running subcuticular 4-0 Monocryl suture. A sterile dressing was applied. The patient was then transported to the Recovery Room in stable condition. All sponge, instrument and needle counts were correct at the end of the case. I was present and scrubbed for the entire procedure.    Complications: No    Estimated Blood Loss (EBL): < 5cc         Implants:   Implant Name Type Inv. Item Serial No.  Lot No. LRB No. Used   MESH PARIETEX PROGRIP LEFT - [id number] Mesh MESH PARIETEX PROGRIP LEFT  [facility] [id number] Left 1       Specimens:   Specimen (12h ago, onward)    Start     Ordered    11/08/18 0732  Specimen to Pathology - Surgery  Once     Comments:  1. Hernia sac-permanent     Start Status   11/08/18 0732 In process       11/08/18 0739                  Condition: Good    Disposition: PACU - hemodynamically stable.    Attestation: Dr. Daniel was present and scrubbed during  all key portions of the case.

## 2018-11-08 NOTE — DISCHARGE INSTRUCTIONS
Discharge Instructions for Open Hernia Repair  You had a procedure called open hernia repair. Also called a rupture, a hernia is a tear or weakness in the wall of the belly. This weakness may be present at birth. Or it can be caused by the wear and tear of daily living. Hernias may get worse with time or with physical stress. But surgery can help repair the weakness and eliminate symptoms.  Activity after surgery  Recommendations include the following:  · After surgery, take it easy for the rest of the day. If you had general anesthesia, dont use machinery or power tools, drink alcohol, or make any major decisions for at least the first 24 hours.  · Dont drive while you are still taking opioid pain medicine, and dont drive until you are able to step firmly on the brake pedal without hesitation.  · Ask others to help with chores and errands while you recover.  · Dont lift anything heavier than 10 pounds until your healthcare provider says its OK.  · Dont mow the lawn, use a vacuum , or do other strenuous activities until your healthcare provider says its OK.  · Walk as often as you feel able.  · Continue the coughing and deep breathing exercises that you learned in the hospital.  · Ask your healthcare provider when you can expect to return to work.  · Avoid constipation:  ¨ Eat fruits, vegetables, and whole grains.  ¨ Drink 6 to 8 glasses of water a day, unless otherwise instructed.  ¨ Use a laxative or a mild stool softener as instructed by your healthcare provider.  Bandage and incision care  Tips include the following:  · Do not get the bandage or wound wet for 48 hours.  · If strips of tape were used to close your incision, dont pull them off. Let them fall off on their own.  · Remove any gauze bandage in 48 hours.  · Wash your incision with mild soap and water. Pat it dry. Dont use oils, powders, or lotions on your incision. Do not soak your incision or take tub baths until cleared by your  healthcare provider.  Follow-up care  Keep follow-up appointments during your recovery. These allow your healthcare provider to check your progress and make sure youre healing well. You may also need to have your stitches, staples, or bandage removed. During office visits, tell your healthcare provider if you have any new symptoms. And be sure to ask any questions you have.     When to call your healthcare provider  Call your healthcare provider immediately if you have any of the following:  · A large amount of swelling or bruising (some testicular swelling and bruising is common)  · Bleeding  · Increasing pain  · Increased redness or drainage of the incision  · Fever of 100.4°F (38.0°C) or higher, or as directed by your healthcare provider  · Trouble urinating  · Nausea or vomiting   Date Last Reviewed: 7/1/2016 © 2000-2017 [employer]. [address] [place] [address]. All rights reserved. This information is not intended as a substitute for professional medical care. Always follow your healthcare professional's instructions.      Recovery After Procedural Sedation (Adult)  You have been given medicine by vein to make you sleep during your surgery. This may have included both a pain medicine and sleeping medicine. Most of the effects have worn off. But you may still have some drowsiness for the next 6 to 8 hours.  Home care  Follow these guidelines when you get home:  · For the next 8 hours, you should be watched by a responsible adult. This person should make sure your condition is not getting worse.  · Don't drink any alcohol for the next 24 hours.  · Don't drive, operate dangerous machinery, or make important business or personal decisions during the next 24 hours.  Note: Your healthcare provider may tell you not to take any medicine by mouth for pain or sleep in the next 4 hours. These medicines may react with the medicines you were given in the hospital. This could cause a much  stronger response than usual.  Follow-up care  Follow up with your healthcare provider if you are not alert and back to your usual level of activity within 12 hours.  When to seek medical advice  Call your healthcare provider right away if any of these occur:  · Drowsiness gets worse  · Weakness or dizziness gets worse  · Repeated vomiting  · You can't be awakened   Date Last Reviewed: 10/18/2016  © 9656-0617 [employer]. [address] [place] [address], [place] [address]. All rights reserved. This information is not intended as a substitute for professional medical care. Always follow your healthcare professional's instructions.    PATIENT INSTRUCTIONS  POST-ANESTHESIA    IMMEDIATELY FOLLOWING SURGERY:  Do not drive or operate machinery for the first twenty four hours after surgery.  Do not make any important decisions for twenty four hours after surgery or while taking narcotic pain medications or sedatives.  If you develop intractable nausea and vomiting or a severe headache please notify your doctor immediately.    FOLLOW-UP:  Please make an appointment with your surgeon as instructed. You do not need to follow up with anesthesia unless specifically instructed to do so.    WOUND CARE INSTRUCTIONS (if applicable):  Keep a dry clean dressing on the anesthesia/puncture wound site if there is drainage.  Once the wound has quit draining you may leave it open to air.  Generally you should leave the bandage intact for twenty four hours unless there is drainage.  If the epidural site drains for more than 36-48 hours please call the anesthesia department.    QUESTIONS?:  Please feel free to call your physician or the hospital  if you have any questions, and they will be happy to assist you.       [facility] Anesthesia Department  [address] [facility] [place]  [phone]

## 2018-11-08 NOTE — INTERVAL H&P NOTE
The patient has been examined and the H&P has been reviewed:    I concur with the findings and no changes have occurred since H&P was written.    Anesthesia/Surgery risks, benefits and alternative options discussed and understood by patient/family.          Active Hospital Problems    Diagnosis  POA    Left inguinal hernia [K40.90]  Yes      Resolved Hospital Problems   No resolved problems to display.

## 2018-11-09 ENCOUNTER — TELEPHONE (OUTPATIENT)
Dept: SURGERY | Facility: CLINIC | Age: 53
End: 2018-11-09

## 2018-11-09 NOTE — TELEPHONE ENCOUNTER
Pt called to report that he has been having a fever of 100.3F at its highest for the past few hours.  He is s/p hernia repair from yesterday 11/8/18.  He has been taking Tylenol which reduces the fever for a time but it then returns.  He will add Ibuprofen for fever control, monitor his temperature, increase his fluid intake, and go to the ER for continuance of the fever despite medication administration and hydration.  He verbalized understanding.

## 2018-11-10 ENCOUNTER — TELEPHONE (OUTPATIENT)
Dept: SURGERY | Facility: HOSPITAL | Age: 53
End: 2018-11-10

## 2018-11-10 ENCOUNTER — HOSPITAL ENCOUNTER (EMERGENCY)
Facility: HOSPITAL | Age: 53
Discharge: HOME | End: 2018-11-10
Payer: COMMERCIAL

## 2018-11-10 VITALS
TEMPERATURE: 100 F | BODY MASS INDEX: 32.9 KG/M2 | DIASTOLIC BLOOD PRESSURE: 81 MMHG | HEART RATE: 75 BPM | WEIGHT: 235 LBS | HEIGHT: 71 IN | OXYGEN SATURATION: 97 % | SYSTOLIC BLOOD PRESSURE: 141 MMHG | RESPIRATION RATE: 19 BRPM

## 2018-11-10 DIAGNOSIS — R50.82 POSTOPERATIVE FEVER: ICD-10-CM

## 2018-11-10 DIAGNOSIS — G89.18 POST-OPERATIVE PAIN: Primary | ICD-10-CM

## 2018-11-10 LAB
ALBUMIN SERPL BCP-MCNC: 3.6 G/DL
ALP SERPL-CCNC: 63 U/L
ALT SERPL W/O P-5'-P-CCNC: 32 U/L
ANION GAP SERPL CALC-SCNC: 10 MMOL/L
AST SERPL-CCNC: 40 U/L
BASOPHILS # BLD AUTO: 0.01 K/UL
BASOPHILS NFR BLD: 0.1 %
BILIRUB SERPL-MCNC: 0.9 MG/DL
BUN SERPL-MCNC: 13 MG/DL
CALCIUM SERPL-MCNC: 9 MG/DL
CHLORIDE SERPL-SCNC: 106 MMOL/L
CO2 SERPL-SCNC: 22 MMOL/L
CREAT SERPL-MCNC: 1.1 MG/DL
DIFFERENTIAL METHOD: ABNORMAL
EOSINOPHIL # BLD AUTO: 0 K/UL
EOSINOPHIL NFR BLD: 0.1 %
ERYTHROCYTE [DISTWIDTH] IN BLOOD BY AUTOMATED COUNT: 12.9 %
EST. GFR  (AFRICAN AMERICAN): >60 ML/MIN/1.73 M^2
EST. GFR  (NON AFRICAN AMERICAN): >60 ML/MIN/1.73 M^2
GLUCOSE SERPL-MCNC: 151 MG/DL
HCT VFR BLD AUTO: 41.4 %
HGB BLD-MCNC: 13.8 G/DL
IMM GRANULOCYTES # BLD AUTO: 0.05 K/UL
IMM GRANULOCYTES NFR BLD AUTO: 0.4 %
LACTATE SERPL-SCNC: 1.3 MMOL/L
LYMPHOCYTES # BLD AUTO: 0.6 K/UL
LYMPHOCYTES NFR BLD: 4.2 %
MCH RBC QN AUTO: 32.2 PG
MCHC RBC AUTO-ENTMCNC: 33.3 G/DL
MCV RBC AUTO: 97 FL
MONOCYTES # BLD AUTO: 0.7 K/UL
MONOCYTES NFR BLD: 4.8 %
NEUTROPHILS # BLD AUTO: 12.8 K/UL
NEUTROPHILS NFR BLD: 90.4 %
NRBC BLD-RTO: 0 /100 WBC
PLATELET # BLD AUTO: 214 K/UL
PMV BLD AUTO: 9.7 FL
POTASSIUM SERPL-SCNC: 3.6 MMOL/L
PROT SERPL-MCNC: 6.7 G/DL
RBC # BLD AUTO: 4.28 M/UL
SODIUM SERPL-SCNC: 138 MMOL/L
WBC # BLD AUTO: 14.11 K/UL

## 2018-11-10 PROCEDURE — 25000003 PHARM REV CODE 250: Performed by: STUDENT IN AN ORGANIZED HEALTH CARE EDUCATION/TRAINING PROGRAM

## 2018-11-10 PROCEDURE — 80053 COMPREHEN METABOLIC PANEL: CPT

## 2018-11-10 PROCEDURE — 99284 EMERGENCY DEPT VISIT MOD MDM: CPT | Mod: 25

## 2018-11-10 PROCEDURE — 85025 COMPLETE CBC W/AUTO DIFF WBC: CPT

## 2018-11-10 PROCEDURE — 83605 ASSAY OF LACTIC ACID: CPT

## 2018-11-10 PROCEDURE — 63600175 PHARM REV CODE 636 W HCPCS: Performed by: STUDENT IN AN ORGANIZED HEALTH CARE EDUCATION/TRAINING PROGRAM

## 2018-11-10 PROCEDURE — 99284 EMERGENCY DEPT VISIT MOD MDM: CPT | Mod: ,,, | Performed by: EMERGENCY MEDICINE

## 2018-11-10 PROCEDURE — 96374 THER/PROPH/DIAG INJ IV PUSH: CPT

## 2018-11-10 RX ORDER — ACETAMINOPHEN 500 MG
1000 TABLET ORAL
Status: COMPLETED | OUTPATIENT
Start: 2018-11-10 | End: 2018-11-10

## 2018-11-10 RX ORDER — KETOROLAC TROMETHAMINE 30 MG/ML
10 INJECTION, SOLUTION INTRAMUSCULAR; INTRAVENOUS
Status: COMPLETED | OUTPATIENT
Start: 2018-11-10 | End: 2018-11-10

## 2018-11-10 RX ADMIN — KETOROLAC TROMETHAMINE 10 MG: 30 INJECTION, SOLUTION INTRAMUSCULAR at 03:11

## 2018-11-10 RX ADMIN — ACETAMINOPHEN 1000 MG: 500 TABLET, FILM COATED ORAL at 03:11

## 2018-11-10 NOTE — TELEPHONE ENCOUNTER
Mr. Barakat had inguinal hernia repair with Dr. Daniel on 11/8. Received call from Mrs. Barakat at 1:45am today (11/10) who stated that her  has had two documented fevers above 102F, once at 5pm and then now at 2am. He has chills. No shortness of breath, cough, or dysuria. Wound is still dressed and she does not have replacement dressing available so it has been left in place for now. There is no drainage or surrounding erythema. He has had abdominal pain, which has been increasing. When she palpated his abdomen, he was tender. I instructed them to come to the ED for evaluation.

## 2018-11-10 NOTE — ED PROVIDER NOTES
Encounter Date: 11/10/2018       History     Chief Complaint   Patient presents with    Post-op Problem     Pt had left inguinal surgery on Thursday morning and now has a fever, highest of 102. Pt also reports headache, pain at surgical site, constipation, nausea. Pt denies drainage from surgical site, chest pain, SOB.      HPI   53 year old man POD#2 status post left inguinal hernia repair presenting for post-operative fever to 102.7 today in the setting of stopping Norco x 24 hours and taking 1 dose of Motrin earlier today. Patient reported constipation, but did have a bowel movement this morning. He reported no difficulty with voiding. The pain is primarily over the incision site, which is not erythematous and has not been draining. Patient is not nauseous at this time. No chest pain, shortness of breath.    Review of patient's allergies indicates:   Allergen Reactions    Erythromycin     Macrolide antibiotics      Past Medical History:   Diagnosis Date    Allergy      Past Surgical History:   Procedure Laterality Date    COLONOSCOPY N/A 2/15/2016    Procedure: COLONOSCOPY;  Surgeon: STONE Sanchez MD;  Location: [facility] ([address] [place] [address]);  Service: Endoscopy;  Laterality: N/A;  PM Prep    COLONOSCOPY N/A 2/15/2016    Performed by STONE Sanchez MD at [facility] ([address] [place] [address])    HERNIA REPAIR      REPAIR, HERNIA, INGUINAL, WITHOUT HISTORY OF PRIOR REPAIR, AGE 3 YEARS OR OLDER Open Left with Mesh Left 11/8/2018    [facility]jam[facility]     Family History   Problem Relation Age of Onset    Cancer Mother         lung    Breast cancer Mother     Cancer Father         liver    No Known Problems Sister     No Known Problems Brother     No Known Problems Sister      Social History     Tobacco Use    Smoking status: Never Smoker   Substance Use Topics    Alcohol use: Yes     Comment: limited    Drug use: Not on file     Review of Systems   Constitutional: Positive for chills  and fever.   HENT: Negative for congestion.    Eyes: Negative for visual disturbance.   Respiratory: Negative for shortness of breath.    Cardiovascular: Negative for chest pain and leg swelling.   Gastrointestinal: Positive for abdominal pain. Negative for abdominal distention and blood in stool.   Endocrine: Negative for polyuria.   Genitourinary: Negative for difficulty urinating.   Musculoskeletal: Negative for arthralgias.   Skin: Positive for wound. Negative for color change.        Healing   Neurological: Negative for weakness.   Psychiatric/Behavioral: Negative for agitation.       Physical Exam     Initial Vitals [11/10/18 0228]   BP Pulse Resp Temp SpO2   138/86 102 17 99.9 °F (37.7 °C) (!) 93 %      MAP       --         Physical Exam    Nursing note and vitals reviewed.  Constitutional: He appears well-developed and well-nourished. He is not diaphoretic. No distress.   HENT:   Head: Normocephalic.   Eyes: EOM are normal. Pupils are equal, round, and reactive to light.   Neck: Normal range of motion. Neck supple.   Cardiovascular: Normal rate, regular rhythm, normal heart sounds and intact distal pulses.   No murmur heard.  Pulmonary/Chest: Breath sounds normal. No respiratory distress. He has no wheezes.   Abdominal: Soft. Bowel sounds are normal. He exhibits no distension. There is no tenderness. There is no rebound and no guarding.   Bedside ultrasound of surgical site on left inguinum:  1 x 1 cm collection directly below suture line with no cobblestoning   Skin: Skin is warm and dry. No rash and no abscess noted. No erythema.   Well healing horizontal left inguinal incision with no induration, erythema, or purulence.   Small fluid collection below visualized suture line on ultrasound 1 x 1 cm.         ED Course   Procedures  Labs Reviewed   CBC W/ AUTO DIFFERENTIAL - Abnormal; Notable for the following components:       Result Value    WBC 14.11 (*)     RBC 4.28 (*)     Hemoglobin 13.8 (*)     MCH 32.2  (*)     Gran # (ANC) 12.8 (*)     Immature Grans (Abs) 0.05 (*)     Lymph # 0.6 (*)     Gran% 90.4 (*)     Lymph% 4.2 (*)     All other components within normal limits   COMPREHENSIVE METABOLIC PANEL - Abnormal; Notable for the following components:    CO2 22 (*)     Glucose 151 (*)     All other components within normal limits   LACTIC ACID, PLASMA   URINALYSIS, REFLEX TO URINE CULTURE          Imaging Results    None          Medical Decision Making:   Initial Assessment:   53 year old  man POD #2 from left inguinal hernia repair presenting for fevers/chills and pain over the incision site. Exam and ultrasound not concerning for abscess at this time. Toradol and PO Tylenol administered. Urinalysis pending. Patient is having bowel movements status post surgery. Presentation concerning for post-operative fever vs. Post-operative pain/inflammation. Anticipate discharge with outpatient follow up as post-op.    Hermilo Richards MD  11/10/2018; 0352    Update:  Patient reporting full improvement after IV Toradol and PO Tylenol.  Patient has leukocytosis, likely reactive secondary to surgery, and normal lactic acid level.  He has been unable to void after voiding on initial presentation before urinalysis was ordered.  Will discuss possibility of discharge at this time with staff given improvement.    Vitals:    11/10/18 0332   BP: (!) 141/81   Pulse: 90   Resp: 16   Temp:      Hermilo Richards MD  11/10/2018; 0456    Update:  Staff agreeing regarding DC at this time without urinalysis.  Will send him home with scheduled outpatient surgery follow up.    Hermilo Richards MD  11/10/2018; 0514                      Clinical Impression:   The primary encounter diagnosis was Post-operative pain. A diagnosis of Postoperative fever was also pertinent to this visit.                             Hermilo Richards MD  Resident  11/10/18 1119       Hermilo Richards MD  Resident  11/10/18 0515       Jake Roque  III, MD  11/19/18 5733

## 2018-11-10 NOTE — ED NOTES
LOC: The patient is awake, alert, and oriented to place, time, situation. Affect is appropriate.  Speech is appropriate and clear.     APPEARANCE: Patient resting comfortably in no acute distress.  Patient is clean and well groomed.    Incision: site is well approximated and minimal to no drainage. Some redness noted to the site.     MUSCULOSKELETAL: Patient moving upper and lower extremities without difficulty.  Denies weakness.     RESPIRATORY: Airway is open and patent. Respirations spontaneous, even, easy, and non-labored.  Patient has a normal effort and rate.  No accessory muscle use noted. Denies cough.     CARDIAC:  Normal rhythm and rate noted.  No peripheral edema noted. No complaints of chest pain.      ABDOMEN: Soft and non tender to palpation.  No distention noted.     NEUROLOGIC: Eyes open spontaneously.  Behavior appropriate to situation.  Follows commands; facial expression symmetrical.  Purposeful motor response noted; normal sensation in all extremities.

## 2018-11-10 NOTE — ED TRIAGE NOTES
"Naldo Barakat, a 53 y.o. male presents to the ED w/ complaint of post op problem. Pt had a hernia repair on Thursday. Pt was feeling fine just until yesterday. Pt complains of pain at the site and pain "in his bowels". Pt complains of chills and a fever that started last night. Pts incision site is well approximated with minimal to no drainage. pts last BM was today. Pt complains of HA. Pt passing flatulence    Triage note:  Chief Complaint   Patient presents with    Post-op Problem     Pt had left inguinal surgery on Thursday morning and now has a fever, highest of 102. Pt also reports headache, pain at surgical site, constipation, nausea. Pt denies drainage from surgical site, chest pain, SOB.      Review of patient's allergies indicates:   Allergen Reactions    Erythromycin     Macrolide antibiotics      Past Medical History:   Diagnosis Date    Allergy      "

## 2018-11-10 NOTE — ED NOTES
Pt placed on continuous cardiac and pulse ox monitoring with blood pressure to cycle every 30 minutes.  VS stable; NSR noted.  Bed locked in lowest position; side rails up and locked x 2; call light, bedside table, and personal belongings within reach. Pt denies needs or complaints at this time; will continue to monitor pt.

## 2018-11-15 ENCOUNTER — OFFICE VISIT (OUTPATIENT)
Dept: SURGERY | Facility: CLINIC | Age: 53
End: 2018-11-15
Payer: COMMERCIAL

## 2018-11-15 VITALS — HEIGHT: 71 IN | BODY MASS INDEX: 33.26 KG/M2 | WEIGHT: 237.56 LBS

## 2018-11-15 DIAGNOSIS — Z98.890 S/P LEFT INGUINAL HERNIA REPAIR: Primary | ICD-10-CM

## 2018-11-15 DIAGNOSIS — Z87.19 S/P LEFT INGUINAL HERNIA REPAIR: Primary | ICD-10-CM

## 2018-11-15 PROCEDURE — 99024 POSTOP FOLLOW-UP VISIT: CPT | Mod: S$GLB,,, | Performed by: PHYSICIAN ASSISTANT

## 2018-11-15 PROCEDURE — 99999 PR PBB SHADOW E&M-EST. PATIENT-LVL III: CPT | Mod: PBBFAC,,, | Performed by: PHYSICIAN ASSISTANT

## 2018-11-15 NOTE — ANESTHESIA POSTPROCEDURE EVALUATION
"Anesthesia Post Evaluation    Patient: Naldo Barakat    Procedure(s) Performed: Procedure(s) (LRB):  REPAIR, HERNIA, INGUINAL, WITHOUT HISTORY OF PRIOR REPAIR, AGE 5 YEARS OR OLDER Open Left with Mesh (Left)    Final Anesthesia Type: general  Patient location during evaluation: PACU  Patient participation: Yes- Able to Participate  Level of consciousness: awake and alert  Post-procedure vital signs: reviewed and stable  Pain management: adequate  Airway patency: patent  PONV status at discharge: No PONV  Anesthetic complications: no      Cardiovascular status: blood pressure returned to baseline  Respiratory status: unassisted  Hydration status: euvolemic  Follow-up not needed.        Visit Vitals  BP (!) 143/95   Pulse (!) 58   Temp 36.8 °C (98.3 °F) (Skin)   Resp 18   Ht 5' 11" (1.803 m)   Wt 106.6 kg (235 lb)   SpO2 100%   BMI 32.78 kg/m²       Pain/Tiki Score: No Data Recorded      "

## 2018-11-15 NOTE — PROGRESS NOTES
SUBJECTIVE:  The patient is a 53 y.o. y/o male 1 weeks s/p left inguinal hernia repair. He denies pain, fevers, chills, nausea, vomiting, diarrhea, or constipation. Eating well with normal appetite and bowel function. Denies redness around or drainage from incisions. Developed fever post-operatively and went to the ER, work up was negative for acute process.    OBJECTIVE:  GEN: male in NAD  ABD: soft, non-tender, non-distended  INCISIONS: clean, dry and intact - healing well without signs of infection or hernia    ASSESSMENT/PLAN:  Doing well 1 week s/p left inguinal hernia repair. Patient is advised to avoid heavy lifting or strenuous activity for another 2-4 weeks. May resume light cardio. Patient may bathe and continue to take a regular diet. Will follow-up with me on an as-needed basis. All questions answered; patient is comfortable with follow-up plan.

## 2018-11-23 ENCOUNTER — PATIENT MESSAGE (OUTPATIENT)
Dept: SURGERY | Facility: CLINIC | Age: 53
End: 2018-11-23

## 2018-11-26 ENCOUNTER — PATIENT MESSAGE (OUTPATIENT)
Dept: SURGERY | Facility: CLINIC | Age: 53
End: 2018-11-26

## 2019-01-26 ENCOUNTER — OFFICE VISIT (OUTPATIENT)
Dept: URGENT CARE | Facility: CLINIC | Age: 54
End: 2019-01-26
Payer: COMMERCIAL

## 2019-01-26 VITALS
BODY MASS INDEX: 33.18 KG/M2 | HEIGHT: 71 IN | TEMPERATURE: 97 F | WEIGHT: 237 LBS | RESPIRATION RATE: 18 BRPM | OXYGEN SATURATION: 98 %

## 2019-01-26 DIAGNOSIS — S91.112A LACERATION OF LEFT GREAT TOE WITHOUT FOREIGN BODY PRESENT OR DAMAGE TO NAIL, INITIAL ENCOUNTER: Primary | ICD-10-CM

## 2019-01-26 PROCEDURE — 12001 LACERATION REPAIR: ICD-10-PCS | Mod: S$GLB,,, | Performed by: FAMILY MEDICINE

## 2019-01-26 PROCEDURE — 99213 OFFICE O/P EST LOW 20 MIN: CPT | Mod: 25,S$GLB,, | Performed by: FAMILY MEDICINE

## 2019-01-26 PROCEDURE — 3008F BODY MASS INDEX DOCD: CPT | Mod: CPTII,S$GLB,, | Performed by: FAMILY MEDICINE

## 2019-01-26 PROCEDURE — 99213 PR OFFICE/OUTPT VISIT, EST, LEVL III, 20-29 MIN: ICD-10-PCS | Mod: 25,S$GLB,, | Performed by: FAMILY MEDICINE

## 2019-01-26 PROCEDURE — 12001 RPR S/N/AX/GEN/TRNK 2.5CM/<: CPT | Mod: S$GLB,,, | Performed by: FAMILY MEDICINE

## 2019-01-26 PROCEDURE — 3008F PR BODY MASS INDEX (BMI) DOCUMENTED: ICD-10-PCS | Mod: CPTII,S$GLB,, | Performed by: FAMILY MEDICINE

## 2019-01-26 NOTE — PATIENT INSTRUCTIONS
Foot Laceration: All Closures  A laceration is a cut through the skin. You have a cut on your foot. Deep cuts may require stitches (sutures). Minor cuts may be treated with surgical tape closures or skin glue.  X-rays may be done if something may have entered the skin through the cut. Your may also need a tetanus shot. This is given if you are not up to date on this vaccination and the object that caused the cut may lead to tetanus.    Home care  · Your healthcare provider may prescribe an antibiotic. This is to help prevent infection. Follow all instructions for taking this medicine. Take the medicine every day until it is gone or you are told to stop. You should not have any left over.  · The healthcare provider may prescribe medicines for pain. Follow instructions for taking them.  · Follow the healthcare providers instructions on how to care for the cut.  · You may be given instructions for keeping weight off of the area to allow the injury to heal.   · Follow the healthcare providers instructions on how to care for the cut.   · Keep the wound clean and dry. Do not get the wound wet until you are told it is okay to do so. If the area gets wet, gently pat it dry with a clean cloth. Replace the wet bandage with a dry one.  · To help prevent infection, wash your hands with soap and water before and after caring for the wound.   · Caring for stitches or staples: Once you no longer need to keep them dry, clean the wound daily. First, remove the bandage. Then wash the area gently with soap and warm water, or as directed by the health care provider. Use a wet cotton swab to loosen and remove any blood or crust that forms. After cleaning, apply a thin layer of antibiotic ointment if advised. Then put on a new bandage unless you are told not to.  · Caring for skin glue: Dont put apply liquid, ointment, or cream on the wound while the glue is in place. Avoid activities that cause heavy sweating. Protect the wound  from sunlight. Do not scratch, rub, or pick at the adhesive film. Do not place tape directly over the film. The glue should peel off within 5 to 10 days.   · Caring for surgical tape: Keep the area dry. If it gets wet, blot it dry with a clean towel. Surgical tape usually falls off within 7 to 10 days. If it has not fallen off after 10 days, you can take it off yourself. Put mineral oil or petroleum jelly on a cotton ball and gently rub the tape until it is removed.  · Once you can get the wound wet, you may shower as usual but do not soak the wound in water (no tub baths or swimming)  · Even with proper treatment, a wound infection may sometimes occur. Check the wound daily for signs of infection listed below.  Follow-up care  Follow up with your healthcare provider as advised. Return to have stitches or staples removed as directed.  When to seek medical advice  Call your healthcare provider right away if any of these occur:  · Wound bleeding not controlled by direct pressure  · Signs of infection, including increasing pain in the wound, increasing wound redness or swelling, or pus or bad odor coming from the wound  · Fever of 100.4°F (38.ºC) or as directed by your healthcare provider  · Stitches or staples come apart or fall out or surgical tape falls off before 7 days  · Wound edges re-open  · Wound changes colors  · Numbness or weakness in the affected foot   · Decreased movement of the foot  Date Last Reviewed: 6/10/2015  © 6551-0409 [employer]. [address] [place] [address], [place] [address]. All rights reserved. This information is not intended as a substitute for professional medical care. Always follow your healthcare professional's instructions.

## 2019-01-26 NOTE — PROGRESS NOTES
"Subjective:       Patient ID: Naldo Barakat is a 53 y.o. male.    Vitals:  height is 5' 11" (1.803 m) and weight is 107.5 kg (237 lb). His temperature is 96.9 °F (36.1 °C). His respiration is 18 and oxygen saturation is 98%.     Chief Complaint: Trauma (lt toe)    Cut on piece of metal, WHILE WALKING AND ACCIDENTALLY HIT BOX WITH METAL, LAST TETANUS OVER 10 YEARS AGO.      Trauma   The incident occurred less than 1 hour ago. The injury mechanism was a direct blow. Toe injury location: lt toe. The pain is mild. Pertinent negatives include no abdominal pain, light-headedness or loss of consciousness. There have been no prior injuries to these areas. His tetanus status is unknown.       Constitution: Negative for fatigue.   HENT: Negative for facial swelling and facial trauma.    Neck: Negative for neck stiffness.   Cardiovascular: Negative for chest trauma.   Eyes: Negative for eye trauma, double vision and blurred vision.   Gastrointestinal: Negative for abdominal trauma, abdominal pain and rectal bleeding.   Genitourinary: Negative for hematuria, genital trauma and pelvic pain.   Musculoskeletal: Positive for pain and trauma. Negative for joint swelling, abnormal ROM of joint and pain with walking.   Skin: Negative for color change, wound, abrasion and laceration.   Neurological: Negative for dizziness, history of vertigo, light-headedness, coordination disturbances, altered mental status and loss of consciousness.   Hematologic/Lymphatic: Negative for history of bleeding disorder.   Psychiatric/Behavioral: Negative for altered mental status.       Objective:      Physical Exam   Constitutional: He is oriented to person, place, and time. He appears well-developed and well-nourished. He is cooperative.  Non-toxic appearance. He does not appear ill. No distress.   HENT:   Head: Normocephalic and atraumatic.   Right Ear: Hearing, tympanic membrane, external ear and ear canal normal.   Left Ear: Hearing, tympanic " membrane, external ear and ear canal normal.   Nose: Nose normal. No mucosal edema, rhinorrhea or nasal deformity. No epistaxis. Right sinus exhibits no maxillary sinus tenderness and no frontal sinus tenderness. Left sinus exhibits no maxillary sinus tenderness and no frontal sinus tenderness.   Mouth/Throat: Uvula is midline, oropharynx is clear and moist and mucous membranes are normal. No trismus in the jaw. Normal dentition. No uvula swelling. No posterior oropharyngeal erythema.   Eyes: Conjunctivae and lids are normal. Right eye exhibits no discharge. Left eye exhibits no discharge. No scleral icterus.   Sclera clear bilat   Neck: Trachea normal, normal range of motion, full passive range of motion without pain and phonation normal. Neck supple. No JVD present. No tracheal deviation present.   Cardiovascular: Normal rate, regular rhythm, normal heart sounds, intact distal pulses and normal pulses. Exam reveals no gallop and no friction rub.   No murmur heard.  Pulmonary/Chest: Effort normal and breath sounds normal. No stridor. No respiratory distress. He has no wheezes.   Abdominal: Soft. Normal appearance and bowel sounds are normal. He exhibits no distension, no pulsatile midline mass and no mass. There is no tenderness.   Musculoskeletal: Normal range of motion. He exhibits no edema or deformity.   LEFT 2ND WEB SPACE WITH SMALL 1/2 CM OPEN WOUND, NO ACTIVE BLEEDING, NO FB SEEN  WOUND CLEANSED  GLUE APPLIED NO COMPLICATION   Lymphadenopathy:     He has no cervical adenopathy.   Neurological: He is alert and oriented to person, place, and time. He exhibits normal muscle tone. Coordination normal.   Skin: Skin is warm, dry and intact. He is not diaphoretic. No pallor.   Psychiatric: He has a normal mood and affect. His speech is normal and behavior is normal. Judgment and thought content normal. Cognition and memory are normal.   Nursing note and vitals reviewed.      Assessment:       1. Laceration of left  great toe without foreign body present or damage to nail, initial encounter        Plan:         Laceration of left great toe without foreign body present or damage to nail, initial encounter  -     Laceration Repair    Other orders  -     diptheria-tetanus toxoids 2-2 Lf unit/0.5 mL injection 0.5 mL     wOUND CARE INSTRUCTION

## 2019-01-26 NOTE — PROCEDURES
Laceration Repair  Date/Time: 1/26/2019 11:45 AM  Performed by: Danyell Herman MD  Authorized by: Danyell Herman MD   Body area: lower extremity  Location details: left foot  Laceration length: 0.5 cm  Foreign bodies: no foreign bodies  Tendon involvement: none  Nerve involvement: superficial  Vascular damage: no  Patient sedated: no  Irrigation solution: tap water  Amount of cleaning: standard  Debridement: none  Degree of undermining: none  Skin closure: glue  Patient tolerance: Patient tolerated the procedure well with no immediate complications

## 2019-03-04 ENCOUNTER — OFFICE VISIT (OUTPATIENT)
Dept: INTERNAL MEDICINE | Facility: CLINIC | Age: 54
End: 2019-03-04
Payer: COMMERCIAL

## 2019-03-04 VITALS
SYSTOLIC BLOOD PRESSURE: 132 MMHG | BODY MASS INDEX: 33.88 KG/M2 | DIASTOLIC BLOOD PRESSURE: 84 MMHG | OXYGEN SATURATION: 98 % | HEIGHT: 71 IN | WEIGHT: 242 LBS | HEART RATE: 97 BPM

## 2019-03-04 DIAGNOSIS — A08.4 VIRAL GASTROENTERITIS: ICD-10-CM

## 2019-03-04 DIAGNOSIS — J45.41 MODERATE PERSISTENT REACTIVE AIRWAY DISEASE WITH ACUTE EXACERBATION: Primary | ICD-10-CM

## 2019-03-04 PROCEDURE — 99999 PR PBB SHADOW E&M-EST. PATIENT-LVL III: CPT | Mod: PBBFAC,,, | Performed by: INTERNAL MEDICINE

## 2019-03-04 PROCEDURE — 3008F PR BODY MASS INDEX (BMI) DOCUMENTED: ICD-10-PCS | Mod: CPTII,S$GLB,, | Performed by: INTERNAL MEDICINE

## 2019-03-04 PROCEDURE — 99999 PR PBB SHADOW E&M-EST. PATIENT-LVL III: ICD-10-PCS | Mod: PBBFAC,,, | Performed by: INTERNAL MEDICINE

## 2019-03-04 PROCEDURE — 99214 PR OFFICE/OUTPT VISIT, EST, LEVL IV, 30-39 MIN: ICD-10-PCS | Mod: 25,S$GLB,, | Performed by: INTERNAL MEDICINE

## 2019-03-04 PROCEDURE — 96372 PR INJECTION,THERAP/PROPH/DIAG2ST, IM OR SUBCUT: ICD-10-PCS | Mod: S$GLB,,, | Performed by: INTERNAL MEDICINE

## 2019-03-04 PROCEDURE — 3008F BODY MASS INDEX DOCD: CPT | Mod: CPTII,S$GLB,, | Performed by: INTERNAL MEDICINE

## 2019-03-04 PROCEDURE — 96372 THER/PROPH/DIAG INJ SC/IM: CPT | Mod: S$GLB,,, | Performed by: INTERNAL MEDICINE

## 2019-03-04 PROCEDURE — 99214 OFFICE O/P EST MOD 30 MIN: CPT | Mod: 25,S$GLB,, | Performed by: INTERNAL MEDICINE

## 2019-03-04 RX ORDER — FLUTICASONE PROPIONATE AND SALMETEROL 250; 50 UG/1; UG/1
1 POWDER RESPIRATORY (INHALATION) 2 TIMES DAILY
Qty: 60 EACH | Refills: 0 | Status: SHIPPED | OUTPATIENT
Start: 2019-03-04 | End: 2019-06-17 | Stop reason: SDUPTHER

## 2019-03-04 RX ORDER — HYDROCODONE BITARTRATE AND HOMATROPINE METHYLBROMIDE ORAL SOLUTION 5; 1.5 MG/5ML; MG/5ML
LIQUID ORAL
Qty: 250 ML | Refills: 0 | Status: SHIPPED | OUTPATIENT
Start: 2019-03-04 | End: 2019-05-20

## 2019-03-04 RX ORDER — PREDNISONE 20 MG/1
TABLET ORAL
Qty: 18 TABLET | Refills: 0 | Status: SHIPPED | OUTPATIENT
Start: 2019-03-04 | End: 2019-03-26 | Stop reason: ALTCHOICE

## 2019-03-04 RX ORDER — TRIAMCINOLONE ACETONIDE 40 MG/ML
80 INJECTION, SUSPENSION INTRA-ARTICULAR; INTRAMUSCULAR ONCE
Status: COMPLETED | OUTPATIENT
Start: 2019-03-04 | End: 2019-03-04

## 2019-03-04 RX ADMIN — TRIAMCINOLONE ACETONIDE 80 MG: 40 INJECTION, SUSPENSION INTRA-ARTICULAR; INTRAMUSCULAR at 12:03

## 2019-03-04 NOTE — PROGRESS NOTES
REASON FOR VISIT:  This is a 53-year-old male who is visiting regarding cough   and diarrhea.    For about five weeks, he has been having persistent dry nonproductive cough.     Occasionally on a few occasions he may have felt a little bit irritated, might   have some nasal congestion, but nothing as frequent.  He does find that his   breathing may be a little bit more labored.  He is not coughing up anything.    For five days, ending on 02/28/2019, he was in [place] and actually was feeling   a little bit better.  He actually reported that two of his children may have   had altitude sickness, but it has been a virus, but since coming back, he has   been coughing more, cough may be more noticeable lying down, does not come on   with eating.  There is no indigestion or heartburn.    About 3:00 a.m., which is about eight hours ago, he woke up feeling nauseated.    Since then, he has had bouts of watery diarrhea.  No fever.  This feels weak.    On review of the chart, he was seen for a cough that lasted for a number of   weeks back in March 2017.    PHYSICAL EXAMINATION:  VITAL SIGNS:  Weight is 242 pounds, pulse 96, blood pressure 104/72.  HEENT:  Tympanic membranes normal.  Nasal mucosa is clear.  Oropharynx minimally   hyperemic.  LUNGS:  Fields clear, good air movement.  HEART:  Regular rate and rhythm.  ABDOMEN:  Active bowel sounds, soft, mildly uncomfortable.  No pain.    IMPRESSION:  1. Reactive airway disease with chronic cough or allergic cough.  2. Viral gastroenteritis.    PLAN:  I will have him take Pepto-Bismol regularly and can use Imodium A-D.  We   will put him on prednisone over the next nine days and Advair Diskus to use one   puff twice a day for a month.  Let me know if there is no improvement.        JAM/HN  dd: 03/04/2019 11:58:30 (CST)  td: 03/04/2019 21:26:27 (CST)  Doc ID   #[id number]  Job ID #[id number]    CC:

## 2019-03-04 NOTE — PROGRESS NOTES
Administered Triamcinolone Acetonide 80 mg to the right  dorsal gluteal, tolerated well, no /o pain noted.

## 2019-03-26 ENCOUNTER — OFFICE VISIT (OUTPATIENT)
Dept: ALLERGY | Facility: CLINIC | Age: 54
End: 2019-03-26
Payer: COMMERCIAL

## 2019-03-26 ENCOUNTER — TELEPHONE (OUTPATIENT)
Dept: DERMATOLOGY | Facility: CLINIC | Age: 54
End: 2019-03-26

## 2019-03-26 VITALS
DIASTOLIC BLOOD PRESSURE: 90 MMHG | WEIGHT: 254.88 LBS | BODY MASS INDEX: 35.68 KG/M2 | HEIGHT: 71 IN | SYSTOLIC BLOOD PRESSURE: 134 MMHG

## 2019-03-26 DIAGNOSIS — R05.9 COUGH: ICD-10-CM

## 2019-03-26 DIAGNOSIS — L29.9 PRURITUS: Primary | ICD-10-CM

## 2019-03-26 PROCEDURE — 99999 PR PBB SHADOW E&M-EST. PATIENT-LVL III: ICD-10-PCS | Mod: PBBFAC,,, | Performed by: PEDIATRICS

## 2019-03-26 PROCEDURE — 3008F PR BODY MASS INDEX (BMI) DOCUMENTED: ICD-10-PCS | Mod: CPTII,S$GLB,, | Performed by: PEDIATRICS

## 2019-03-26 PROCEDURE — 99203 PR OFFICE/OUTPT VISIT, NEW, LEVL III, 30-44 MIN: ICD-10-PCS | Mod: S$GLB,,, | Performed by: PEDIATRICS

## 2019-03-26 PROCEDURE — 99999 PR PBB SHADOW E&M-EST. PATIENT-LVL III: CPT | Mod: PBBFAC,,, | Performed by: PEDIATRICS

## 2019-03-26 PROCEDURE — 3008F BODY MASS INDEX DOCD: CPT | Mod: CPTII,S$GLB,, | Performed by: PEDIATRICS

## 2019-03-26 PROCEDURE — 99203 OFFICE O/P NEW LOW 30 MIN: CPT | Mod: S$GLB,,, | Performed by: PEDIATRICS

## 2019-03-26 RX ORDER — PERMETHRIN 50 MG/G
CREAM TOPICAL ONCE
Qty: 60 G | Refills: 0 | Status: SHIPPED | OUTPATIENT
Start: 2019-03-26 | End: 2019-03-26

## 2019-03-26 RX ORDER — TRIAMCINOLONE ACETONIDE 1 MG/G
CREAM TOPICAL 2 TIMES DAILY
Qty: 15 G | Refills: 1 | Status: SHIPPED | OUTPATIENT
Start: 2019-03-26 | End: 2020-09-30

## 2019-03-26 NOTE — TELEPHONE ENCOUNTER
Scheduled pt to see derm, per Allergy departments diagnosis. Pt accepted 3/27 at 220pm with Melita Noriega, which approved the appt

## 2019-03-26 NOTE — PROGRESS NOTES
ALLERGY & IMMUNOLOGY CLINIC - INITIAL CONSULTATION     HISTORY OF PRESENT ILLNESS     Patient ID: Naldo Barakat is a 53 y.o. male    CC: Pruritus    HPI: 53 year old male with no significant past medical history presents today with three days of pruritus in the absence of rash. Pruritus is located on his left arm and leg as well as right arm. Also with pruritus of his scalp. Symptoms come and and go. Symptoms are least when he gets up in the morning. After showering and heading to work symptoms become more frequent. Has not tried anything to treat the pruritus. Only change noted is a new tank less water heater in the house in the past week. The kitchen in his house is also being remodeled. Denies any recent travel, new clothing, new soaps, new shampoo's, new conditioners, no new pets. Denies frequent handwashing or increased dry skin. 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 Denies any related urticaria. Denies any systemic symptoms. Seen last month for a cough which was treated with steroids and Advair. Cough is mostly resolved. Denies any atopic history other than the mild eczema. No food allergies. Intolerance to erythromycin causing upset stomach, no other drug allergies. Was treated three years ago for scabies with permethrin. Denies anyone else on the house currently with similar symptoms and no recent contact with anyone diagnosed with scabies.   REVIEW OF SYSTEMS   Review of Systems   Constitutional: Negative.    HENT: Negative.    Eyes: Negative.    Respiratory: Positive for cough. Negative for hemoptysis, sputum production, shortness of breath and wheezing.    Cardiovascular: Negative.    Gastrointestinal: Negative.    Genitourinary: Negative.    Musculoskeletal: Negative.    Skin: Positive for itching. Negative for rash.   Neurological: Negative.    Endo/Heme/Allergies: Negative.    Psychiatric/Behavioral: Negative.       MEDICAL HISTORY      Past Medical History:   Diagnosis Date    Allergy     Eczema         PHYSICAL EXAM   Physical Exam   Constitutional: He is oriented to person, place, and time and well-developed, well-nourished, and in no distress. No distress.   HENT:   Head: Normocephalic and atraumatic.   Eyes: EOM are normal. Right eye exhibits no discharge. Left eye exhibits no discharge. No scleral icterus.   Neck: Normal range of motion. Neck supple.   Pulmonary/Chest: Effort normal and breath sounds normal. No respiratory distress.   Neurological: He is alert and oriented to person, place, and time. No cranial nerve deficit. GCS score is 15.   Skin: Skin is warm and dry. Abrasion (small abrasions on dorsum of hands and forearms bilaterally) noted. No bruising, no lesion, no petechiae and no rash noted. He is not diaphoretic. No cyanosis. No pallor.   No evidence of burrow lesions on exam.        LABORATORY STUDIES     Recent Results (from the past 4032 hour(s))   CBC auto differential    Collection Time: 11/10/18  3:05 AM   Result Value Ref Range    WBC 14.11 (H) 3.90 - 12.70 K/uL    RBC 4.28 (L) 4.60 - 6.20 M/uL    Hemoglobin 13.8 (L) 14.0 - 18.0 g/dL    Hematocrit 41.4 40.0 - 54.0 %    MCV 97 82 - 98 fL    MCH 32.2 (H) 27.0 - 31.0 pg    MCHC 33.3 32.0 - 36.0 g/dL    RDW 12.9 11.5 - 14.5 %    Platelets 214 150 - 350 K/uL    MPV 9.7 9.2 - 12.9 fL    Immature Granulocytes 0.4 0.0 - 0.5 %    Gran # (ANC) 12.8 (H) 1.8 - 7.7 K/uL    Immature Grans (Abs) 0.05 (H) 0.00 - 0.04 K/uL    Lymph # 0.6 (L) 1.0 - 4.8 K/uL    Mono # 0.7 0.3 - 1.0 K/uL    Eos # 0.0 0.0 - 0.5 K/uL    Baso # 0.01 0.00 - 0.20 K/uL    nRBC 0 0 /100 WBC    Gran% 90.4 (H) 38.0 - 73.0 %    Lymph% 4.2 (L) 18.0 - 48.0 %    Mono% 4.8 4.0 - 15.0 %    Eosinophil% 0.1 0.0 - 8.0 %    Basophil% 0.1 0.0 - 1.9 %    Differential Method Automated    Comprehensive metabolic panel    Collection Time: 11/10/18  3:05 AM   Result Value Ref Range    Sodium 138 136 - 145 mmol/L    Potassium  3.6 3.5 - 5.1 mmol/L    Chloride 106 95 - 110 mmol/L    CO2 22 (L) 23 - 29 mmol/L    Glucose 151 (H) 70 - 110 mg/dL    BUN, Bld 13 6 - 20 mg/dL    Creatinine 1.1 0.5 - 1.4 mg/dL    Calcium 9.0 8.7 - 10.5 mg/dL    Total Protein 6.7 6.0 - 8.4 g/dL    Albumin 3.6 3.5 - 5.2 g/dL    Total Bilirubin 0.9 0.1 - 1.0 mg/dL    Alkaline Phosphatase 63 55 - 135 U/L    AST 40 10 - 40 U/L    ALT 32 10 - 44 U/L    Anion Gap 10 8 - 16 mmol/L    eGFR if African American >60.0 >60 mL/min/1.73 m^2    eGFR if non African American >60.0 >60 mL/min/1.73 m^2   Lactic acid, plasma    Collection Time: 11/10/18  3:05 AM   Result Value Ref Range    Lactate (Lactic Acid) 1.3 0.5 - 2.2 mmol/L      CHART REVIEW   Reviewed previous dermatology notes, labs.   ASSESSMENT & PLAN     Naldo Barakat is a 53 y.o. male with     Pruritus: Acute in onset in the absence of rash. Unclear etiology at this time. Remote history of scabies in 2016 but no recent contacts with scabies and exam without pathognomonic findings. No other identified triggers and in the absence of urticaria this is unlikely to be allergic in etiology. Recommend trying daily cetirizine to see if pruritus improves. Will prescribe triamcinolone cream to be applied to pruritic areas. If symptoms persist patient should follow up with dermatology. Will schedule him for derm appointment today. Will prescribe permethrin cream if he prefers to treat for scabies empirically.     Cough: Presentation and symptoms not consistent with asthma. Continue management per PCP.    Other orders  -     triamcinolone acetonide 0.1% (KENALOG) 0.1 % cream; Apply topically 2 (two) times daily. for 10 days  Dispense: 15 g; Refill: 1  -     permethrin (ELIMITE) 5 % cream; Apply topically once. for 1 dose  Dispense: 60 g; Refill: 0    Follow up: ALEAHN     Justice Cadet DO  Allergy Immunology Fellow

## 2019-05-20 ENCOUNTER — OFFICE VISIT (OUTPATIENT)
Dept: INTERNAL MEDICINE | Facility: CLINIC | Age: 54
End: 2019-05-20
Payer: COMMERCIAL

## 2019-05-20 ENCOUNTER — LAB VISIT (OUTPATIENT)
Dept: LAB | Facility: HOSPITAL | Age: 54
End: 2019-05-20
Attending: INTERNAL MEDICINE
Payer: COMMERCIAL

## 2019-05-20 VITALS
HEART RATE: 60 BPM | HEIGHT: 71 IN | SYSTOLIC BLOOD PRESSURE: 120 MMHG | DIASTOLIC BLOOD PRESSURE: 82 MMHG | WEIGHT: 260 LBS | BODY MASS INDEX: 36.4 KG/M2 | OXYGEN SATURATION: 99 %

## 2019-05-20 DIAGNOSIS — G47.33 OSA (OBSTRUCTIVE SLEEP APNEA): ICD-10-CM

## 2019-05-20 DIAGNOSIS — H66.91 RIGHT OTITIS MEDIA, UNSPECIFIED OTITIS MEDIA TYPE: Primary | ICD-10-CM

## 2019-05-20 LAB — TSH SERPL DL<=0.005 MIU/L-ACNC: 1.72 UIU/ML (ref 0.4–4)

## 2019-05-20 PROCEDURE — 3008F BODY MASS INDEX DOCD: CPT | Mod: CPTII,S$GLB,, | Performed by: INTERNAL MEDICINE

## 2019-05-20 PROCEDURE — 99213 PR OFFICE/OUTPT VISIT, EST, LEVL III, 20-29 MIN: ICD-10-PCS | Mod: S$GLB,,, | Performed by: INTERNAL MEDICINE

## 2019-05-20 PROCEDURE — 99213 OFFICE O/P EST LOW 20 MIN: CPT | Mod: S$GLB,,, | Performed by: INTERNAL MEDICINE

## 2019-05-20 PROCEDURE — 36415 COLL VENOUS BLD VENIPUNCTURE: CPT | Mod: PO

## 2019-05-20 PROCEDURE — 99999 PR PBB SHADOW E&M-EST. PATIENT-LVL III: CPT | Mod: PBBFAC,,, | Performed by: INTERNAL MEDICINE

## 2019-05-20 PROCEDURE — 84443 ASSAY THYROID STIM HORMONE: CPT

## 2019-05-20 PROCEDURE — 3008F PR BODY MASS INDEX (BMI) DOCUMENTED: ICD-10-PCS | Mod: CPTII,S$GLB,, | Performed by: INTERNAL MEDICINE

## 2019-05-20 PROCEDURE — 99999 PR PBB SHADOW E&M-EST. PATIENT-LVL III: ICD-10-PCS | Mod: PBBFAC,,, | Performed by: INTERNAL MEDICINE

## 2019-05-20 RX ORDER — AMOXICILLIN 875 MG/1
875 TABLET, FILM COATED ORAL 2 TIMES DAILY
Qty: 20 TABLET | Refills: 0 | Status: SHIPPED | OUTPATIENT
Start: 2019-05-20 | End: 2019-06-21

## 2019-05-20 RX ORDER — NEOMYCIN SULFATE, POLYMYXIN B SULFATE AND HYDROCORTISONE 10; 3.5; 1 MG/ML; MG/ML; [USP'U]/ML
3 SUSPENSION/ DROPS AURICULAR (OTIC) 3 TIMES DAILY
Qty: 10 ML | Refills: 0 | Status: SHIPPED | OUTPATIENT
Start: 2019-05-20 | End: 2020-09-30

## 2019-05-20 NOTE — PROGRESS NOTES
REASON FOR VISIT:  This is a 53-year-old female.  Since yesterday evening he has   been feeling a pain involving his right ear.  It is really not within the ear,   but slightly postauricular where he feels a fullness.  He does not recall any   incident or activity, but yesterday he was out in his pool cleaning for quite   some time and gotten under water.  He has had swimmer's ear in the past.  There   has been no nasal or head congestion or sore throat.    PHYSICAL EXAMINATION:  VITAL SIGNS:  His weight is 260 pounds.  Vital signs per EPIC.  HEENT:  The left ear canal and tympanic membrane is normal.  The left ear canal   was obstructed with cerumen, which had to be flushed out in order for it to be   evacuated.  There was slight hyperemia and the tympanic membrane was slightly   hyperemic.  There was a slight submandibular lymph gland enlargement on the   right.  Oropharynx, no abnormal findings.    IMPRESSION:  Otitis media.    PLAN:  Amoxicillin 875 mg twice a day for 10 days along with Cortisporin Otic   solution.    ADDENDUM:  When appointment was over he inquired about sleep apnea.  His wife   has noted snoring.  She has noted apnea spells when sleeping, occasionally   breathes with his mouth open.  He can wake up feeling tired and gets somnolent   during the day.  Because of such, we will arrange to have a TSH lab and sleep   study test.        JAM/HN  dd: 05/20/2019 12:08:59 (CDT)  td: 05/21/2019 00:17:37 (CDT)  Doc ID   #[id number]  Job ID #[id number]    CC:

## 2019-05-21 ENCOUNTER — PATIENT MESSAGE (OUTPATIENT)
Dept: INTERNAL MEDICINE | Facility: CLINIC | Age: 54
End: 2019-05-21

## 2019-05-27 ENCOUNTER — PATIENT MESSAGE (OUTPATIENT)
Dept: INTERNAL MEDICINE | Facility: CLINIC | Age: 54
End: 2019-05-27

## 2019-05-28 ENCOUNTER — TELEPHONE (OUTPATIENT)
Dept: SLEEP MEDICINE | Facility: OTHER | Age: 54
End: 2019-05-28

## 2019-06-03 ENCOUNTER — PATIENT MESSAGE (OUTPATIENT)
Dept: INTERNAL MEDICINE | Facility: CLINIC | Age: 54
End: 2019-06-03

## 2019-06-04 ENCOUNTER — TELEPHONE (OUTPATIENT)
Dept: SLEEP MEDICINE | Facility: OTHER | Age: 54
End: 2019-06-04

## 2019-06-12 ENCOUNTER — HOSPITAL ENCOUNTER (OUTPATIENT)
Dept: SLEEP MEDICINE | Facility: OTHER | Age: 54
Discharge: HOME | End: 2019-06-12
Payer: COMMERCIAL

## 2019-06-12 DIAGNOSIS — G47.33 OSA (OBSTRUCTIVE SLEEP APNEA): ICD-10-CM

## 2019-06-12 PROCEDURE — 95810 POLYSOM 6/> YRS 4/> PARAM: CPT | Mod: 26,,, | Performed by: PSYCHIATRY & NEUROLOGY

## 2019-06-12 PROCEDURE — 95810 POLYSOM 6/> YRS 4/> PARAM: CPT

## 2019-06-12 PROCEDURE — 95810 PR POLYSOMNOGRAPHY, 4 OR MORE: ICD-10-PCS | Mod: 26,,, | Performed by: PSYCHIATRY & NEUROLOGY

## 2019-06-13 NOTE — PROGRESS NOTES
End of the night summary    [facility]      Patient education/cpap information prior to stuyd/setup    EKG Appears to be- NSR w PVCs    Low spo2 -  90%    Any difficulties recording: NONE      Tech summary Comments:      pt did not meet criteria for split on cpap, moderate to loud snoring observed, most of pts events observed supine sleep, pt leg movements observed frequently, pt slept well no reports of discomfort

## 2019-06-17 ENCOUNTER — PATIENT MESSAGE (OUTPATIENT)
Dept: INTERNAL MEDICINE | Facility: CLINIC | Age: 54
End: 2019-06-17

## 2019-06-17 RX ORDER — FLUTICASONE PROPIONATE AND SALMETEROL 250; 50 UG/1; UG/1
1 POWDER RESPIRATORY (INHALATION) 2 TIMES DAILY
Qty: 60 EACH | Refills: 0 | Status: SHIPPED | OUTPATIENT
Start: 2019-06-17 | End: 2019-07-14 | Stop reason: SDUPTHER

## 2019-06-20 ENCOUNTER — PATIENT MESSAGE (OUTPATIENT)
Dept: INTERNAL MEDICINE | Facility: CLINIC | Age: 54
End: 2019-06-20

## 2019-06-20 DIAGNOSIS — G47.33 OSA (OBSTRUCTIVE SLEEP APNEA): Primary | ICD-10-CM

## 2019-06-20 NOTE — PROCEDURES
"Dear Referring Provider,    The sleep study that you ordered is complete. You have ordered sleep LAB services to perform the sleep study for Naldo Barakat.     Please find Sleep Study result in "Chart Review" under the "Media tab."     As the ordering provider, you are responsible for reviewing the results and implementing a treatment plan with your patient. If you need a Sleep Medicine provider to explain the sleep study findings and arrange treatment for the patient, please refer patient for consultation to our Sleep Clinic via [facility] with Ambulatory Consult Sleep.    To do that please place an order for an "Ambulatory Consult Sleep" - it will go to our clinic work queue for our Medical Assistant to contact the patient for an appointment.     For any questions, please contact our clinic staff at [phone] to talk to clinical staff.      "

## 2019-06-21 ENCOUNTER — LAB VISIT (OUTPATIENT)
Dept: LAB | Facility: HOSPITAL | Age: 54
End: 2019-06-21
Attending: INTERNAL MEDICINE
Payer: COMMERCIAL

## 2019-06-21 ENCOUNTER — OFFICE VISIT (OUTPATIENT)
Dept: INTERNAL MEDICINE | Facility: CLINIC | Age: 54
End: 2019-06-21
Payer: COMMERCIAL

## 2019-06-21 ENCOUNTER — PATIENT MESSAGE (OUTPATIENT)
Dept: INTERNAL MEDICINE | Facility: CLINIC | Age: 54
End: 2019-06-21

## 2019-06-21 ENCOUNTER — HOSPITAL ENCOUNTER (OUTPATIENT)
Dept: RADIOLOGY | Facility: HOSPITAL | Age: 54
Discharge: HOME | End: 2019-06-21
Attending: INTERNAL MEDICINE
Payer: COMMERCIAL

## 2019-06-21 VITALS
SYSTOLIC BLOOD PRESSURE: 130 MMHG | WEIGHT: 253 LBS | DIASTOLIC BLOOD PRESSURE: 70 MMHG | TEMPERATURE: 99 F | HEIGHT: 71 IN | HEART RATE: 90 BPM | OXYGEN SATURATION: 98 % | BODY MASS INDEX: 35.42 KG/M2

## 2019-06-21 DIAGNOSIS — J45.909 ACUTE ASTHMATIC BRONCHITIS: ICD-10-CM

## 2019-06-21 DIAGNOSIS — H66.92 LEFT OTITIS MEDIA, UNSPECIFIED OTITIS MEDIA TYPE: ICD-10-CM

## 2019-06-21 DIAGNOSIS — J45.909 ACUTE ASTHMATIC BRONCHITIS: Primary | ICD-10-CM

## 2019-06-21 DIAGNOSIS — J02.9 PHARYNGITIS, UNSPECIFIED ETIOLOGY: ICD-10-CM

## 2019-06-21 LAB
BASOPHILS # BLD AUTO: 0.03 K/UL (ref 0–0.2)
BASOPHILS NFR BLD: 0.5 % (ref 0–1.9)
DIFFERENTIAL METHOD: ABNORMAL
EOSINOPHIL # BLD AUTO: 0 K/UL (ref 0–0.5)
EOSINOPHIL NFR BLD: 0.2 % (ref 0–8)
ERYTHROCYTE [DISTWIDTH] IN BLOOD BY AUTOMATED COUNT: 12.3 % (ref 11.5–14.5)
HCT VFR BLD AUTO: 43.7 % (ref 40–54)
HGB BLD-MCNC: 14.7 G/DL (ref 14–18)
IMM GRANULOCYTES # BLD AUTO: 0.02 K/UL (ref 0–0.04)
IMM GRANULOCYTES NFR BLD AUTO: 0.3 % (ref 0–0.5)
LYMPHOCYTES # BLD AUTO: 1.6 K/UL (ref 1–4.8)
LYMPHOCYTES NFR BLD: 26.2 % (ref 18–48)
MCH RBC QN AUTO: 32.6 PG (ref 27–31)
MCHC RBC AUTO-ENTMCNC: 33.6 G/DL (ref 32–36)
MCV RBC AUTO: 97 FL (ref 82–98)
MONOCYTES # BLD AUTO: 0.5 K/UL (ref 0.3–1)
MONOCYTES NFR BLD: 8 % (ref 4–15)
NEUTROPHILS # BLD AUTO: 4.1 K/UL (ref 1.8–7.7)
NEUTROPHILS NFR BLD: 64.8 % (ref 38–73)
NRBC BLD-RTO: 0 /100 WBC
PLATELET # BLD AUTO: 219 K/UL (ref 150–350)
PMV BLD AUTO: 9.9 FL (ref 9.2–12.9)
RBC # BLD AUTO: 4.51 M/UL (ref 4.6–6.2)
WBC # BLD AUTO: 6.25 K/UL (ref 3.9–12.7)

## 2019-06-21 PROCEDURE — 3008F PR BODY MASS INDEX (BMI) DOCUMENTED: ICD-10-PCS | Mod: CPTII,S$GLB,, | Performed by: INTERNAL MEDICINE

## 2019-06-21 PROCEDURE — 71046 X-RAY EXAM CHEST 2 VIEWS: CPT | Mod: 26,,, | Performed by: RADIOLOGY

## 2019-06-21 PROCEDURE — 99999 PR PBB SHADOW E&M-EST. PATIENT-LVL III: ICD-10-PCS | Mod: PBBFAC,,, | Performed by: INTERNAL MEDICINE

## 2019-06-21 PROCEDURE — 3008F BODY MASS INDEX DOCD: CPT | Mod: CPTII,S$GLB,, | Performed by: INTERNAL MEDICINE

## 2019-06-21 PROCEDURE — 99214 OFFICE O/P EST MOD 30 MIN: CPT | Mod: 25,S$GLB,, | Performed by: INTERNAL MEDICINE

## 2019-06-21 PROCEDURE — 85025 COMPLETE CBC W/AUTO DIFF WBC: CPT

## 2019-06-21 PROCEDURE — 96372 PR INJECTION,THERAP/PROPH/DIAG2ST, IM OR SUBCUT: ICD-10-PCS | Mod: S$GLB,,, | Performed by: INTERNAL MEDICINE

## 2019-06-21 PROCEDURE — 71046 X-RAY EXAM CHEST 2 VIEWS: CPT | Mod: TC

## 2019-06-21 PROCEDURE — 36415 COLL VENOUS BLD VENIPUNCTURE: CPT | Mod: PO

## 2019-06-21 PROCEDURE — 94640 AIRWAY INHALATION TREATMENT: CPT | Mod: S$GLB,,, | Performed by: INTERNAL MEDICINE

## 2019-06-21 PROCEDURE — 87081 CULTURE SCREEN ONLY: CPT

## 2019-06-21 PROCEDURE — 96372 THER/PROPH/DIAG INJ SC/IM: CPT | Mod: S$GLB,,, | Performed by: INTERNAL MEDICINE

## 2019-06-21 PROCEDURE — 94640 PR INHAL RX, AIRWAY OBST/DX SPUTUM INDUCT: ICD-10-PCS | Mod: S$GLB,,, | Performed by: INTERNAL MEDICINE

## 2019-06-21 PROCEDURE — 99999 PR PBB SHADOW E&M-EST. PATIENT-LVL III: CPT | Mod: PBBFAC,,, | Performed by: INTERNAL MEDICINE

## 2019-06-21 PROCEDURE — 71046 XR CHEST PA AND LATERAL: ICD-10-PCS | Mod: 26,,, | Performed by: RADIOLOGY

## 2019-06-21 PROCEDURE — 99214 PR OFFICE/OUTPT VISIT, EST, LEVL IV, 30-39 MIN: ICD-10-PCS | Mod: 25,S$GLB,, | Performed by: INTERNAL MEDICINE

## 2019-06-21 RX ORDER — ALBUTEROL SULFATE 90 UG/1
2 AEROSOL, METERED RESPIRATORY (INHALATION) EVERY 6 HOURS PRN
Qty: 1 EACH | Refills: 1 | Status: SHIPPED | OUTPATIENT
Start: 2019-06-21 | End: 2020-09-30

## 2019-06-21 RX ORDER — CEFTRIAXONE 1 G/1
1 INJECTION, POWDER, FOR SOLUTION INTRAMUSCULAR; INTRAVENOUS
Status: DISCONTINUED | OUTPATIENT
Start: 2019-06-21 | End: 2023-08-18 | Stop reason: HOSPADM

## 2019-06-21 RX ORDER — PREDNISONE 20 MG/1
TABLET ORAL
Qty: 12 TABLET | Refills: 0 | Status: SHIPPED | OUTPATIENT
Start: 2019-06-21 | End: 2020-02-18

## 2019-06-21 RX ORDER — AMOXICILLIN AND CLAVULANATE POTASSIUM 875; 125 MG/1; MG/1
1 TABLET, FILM COATED ORAL 2 TIMES DAILY
Qty: 20 TABLET | Refills: 0 | Status: SHIPPED | OUTPATIENT
Start: 2019-06-21 | End: 2019-07-01

## 2019-06-21 RX ORDER — ALBUTEROL SULFATE 0.83 MG/ML
2.5 SOLUTION RESPIRATORY (INHALATION) ONCE
Status: COMPLETED | OUTPATIENT
Start: 2019-06-21 | End: 2019-06-21

## 2019-06-21 RX ADMIN — ALBUTEROL SULFATE 2.5 MG: 0.83 SOLUTION RESPIRATORY (INHALATION) at 03:06

## 2019-06-21 RX ADMIN — CEFTRIAXONE 1 G: 1 INJECTION, POWDER, FOR SOLUTION INTRAMUSCULAR; INTRAVENOUS at 03:06

## 2019-06-21 NOTE — PROGRESS NOTES
REASON FOR VISIT:  This is a 53-year-old male.  Five days ago, he started having   chest congestion and cough and then later started having fever where it was low   grade, 99, in the day, but then it gets up to over 100-103 in the evening,   which was last night.  He is wheezing.  He does feel short of breath when he   exerts himself.  His throat is irritated and his left ear is hurting.  He was   seen for otitis media back on May 21st.  He took amoxicillin, but only for about   seven days.  In the beginning of this current event, he was given a   prescription for Advair Diskus.    PAST MEDICAL HISTORY:  No major health conditions, although he is being   evaluated for sleep apnea.    PHYSICAL EXAMINATION:  VITAL SIGNS:  His weight is 253, temperature is 99, pulse 92, blood pressure   130/70.  HEENT:  The left tympanic membrane is hyperemic compared to the right.  Nasal   mucosa is swollen and congested.  Oropharynx, the tonsils has some mild   exudates, erythematous.  He does have an enlarged left submandibular lymph   gland.  LUNGS:  No rales, but there are coarse wheezing with expiration.  HEART:  Regular rate and rhythm.    IMPRESSION:  Acute asthmatic bronchitis associated with pharyngitis and otitis   media.    PLAN:  Today we will get a CBC, throat culture, and chest x-ray.  Rocephin 1 g   IM, prednisone taper over the next eight days given.  Augmentin 875 mg twice a   day for 10 days.  Take over-the-counter Mucinex.        JAM/HN  dd: 06/21/2019 15:38:01 (CDT)  td: 06/22/2019 00:16:43 (CDT)  Doc ID   #[id number]  Job ID #[id number]    CC:

## 2019-06-22 ENCOUNTER — PATIENT MESSAGE (OUTPATIENT)
Dept: INTERNAL MEDICINE | Facility: CLINIC | Age: 54
End: 2019-06-22

## 2019-06-22 ENCOUNTER — TELEPHONE (OUTPATIENT)
Dept: INTERNAL MEDICINE | Facility: CLINIC | Age: 54
End: 2019-06-22

## 2019-06-22 DIAGNOSIS — J18.9 PNEUMONIA OF RIGHT LOWER LOBE DUE TO INFECTIOUS ORGANISM: Primary | ICD-10-CM

## 2019-06-22 RX ORDER — HYDROCODONE BITARTRATE AND HOMATROPINE METHYLBROMIDE ORAL SOLUTION 5; 1.5 MG/5ML; MG/5ML
LIQUID ORAL
Qty: 250 ML | Refills: 0 | Status: SHIPPED | OUTPATIENT
Start: 2019-06-22 | End: 2019-06-22 | Stop reason: SDUPTHER

## 2019-06-22 RX ORDER — HYDROCODONE BITARTRATE AND HOMATROPINE METHYLBROMIDE ORAL SOLUTION 5; 1.5 MG/5ML; MG/5ML
LIQUID ORAL
Qty: 250 ML | Refills: 0 | Status: SHIPPED | OUTPATIENT
Start: 2019-06-22 | End: 2020-02-18

## 2019-06-24 ENCOUNTER — PATIENT MESSAGE (OUTPATIENT)
Dept: INTERNAL MEDICINE | Facility: CLINIC | Age: 54
End: 2019-06-24

## 2019-06-24 ENCOUNTER — TELEPHONE (OUTPATIENT)
Dept: INTERNAL MEDICINE | Facility: CLINIC | Age: 54
End: 2019-06-24

## 2019-06-24 LAB — BACTERIA THROAT CULT: NORMAL

## 2019-06-24 NOTE — TELEPHONE ENCOUNTER
Set up chest x-ray in 3 weeks     He works at the main clinic          Documentation      Scheduled, mailed out.

## 2019-07-02 ENCOUNTER — TELEPHONE (OUTPATIENT)
Dept: SLEEP MEDICINE | Facility: OTHER | Age: 54
End: 2019-07-02

## 2019-07-11 ENCOUNTER — TELEPHONE (OUTPATIENT)
Dept: SLEEP MEDICINE | Facility: OTHER | Age: 54
End: 2019-07-11

## 2019-07-14 RX ORDER — FLUTICASONE PROPIONATE AND SALMETEROL 250; 50 UG/1; UG/1
POWDER RESPIRATORY (INHALATION)
Qty: 60 EACH | Refills: 1 | Status: SHIPPED | OUTPATIENT
Start: 2019-07-14 | End: 2020-09-30

## 2019-07-15 ENCOUNTER — PATIENT MESSAGE (OUTPATIENT)
Dept: INTERNAL MEDICINE | Facility: CLINIC | Age: 54
End: 2019-07-15

## 2019-07-15 ENCOUNTER — HOSPITAL ENCOUNTER (OUTPATIENT)
Dept: RADIOLOGY | Facility: HOSPITAL | Age: 54
Discharge: HOME | End: 2019-07-15
Attending: INTERNAL MEDICINE
Payer: COMMERCIAL

## 2019-07-15 DIAGNOSIS — J18.9 PNEUMONIA OF RIGHT LOWER LOBE DUE TO INFECTIOUS ORGANISM: ICD-10-CM

## 2019-07-15 PROCEDURE — 71046 X-RAY EXAM CHEST 2 VIEWS: CPT | Mod: 26,,, | Performed by: RADIOLOGY

## 2019-07-15 PROCEDURE — 71046 XR CHEST PA AND LATERAL: ICD-10-PCS | Mod: 26,,, | Performed by: RADIOLOGY

## 2019-07-15 PROCEDURE — 71046 X-RAY EXAM CHEST 2 VIEWS: CPT | Mod: TC

## 2019-07-16 ENCOUNTER — PATIENT MESSAGE (OUTPATIENT)
Dept: INTERNAL MEDICINE | Facility: CLINIC | Age: 54
End: 2019-07-16

## 2019-07-16 ENCOUNTER — HOSPITAL ENCOUNTER (OUTPATIENT)
Dept: SLEEP MEDICINE | Facility: HOSPITAL | Age: 54
Discharge: HOME | End: 2019-07-16
Attending: INTERNAL MEDICINE
Payer: COMMERCIAL

## 2019-07-16 DIAGNOSIS — R91.8 PULMONARY INFILTRATE: Primary | ICD-10-CM

## 2019-07-16 DIAGNOSIS — G47.33 OSA (OBSTRUCTIVE SLEEP APNEA): ICD-10-CM

## 2019-07-16 PROCEDURE — 95811 POLYSOM 6/>YRS CPAP 4/> PARM: CPT | Mod: 26,,, | Performed by: PSYCHIATRY & NEUROLOGY

## 2019-07-16 PROCEDURE — 95811 PR POLYSOMNOGRAPHY W/CPAP: ICD-10-PCS | Mod: 26,,, | Performed by: PSYCHIATRY & NEUROLOGY

## 2019-07-16 PROCEDURE — 95811 POLYSOM 6/>YRS CPAP 4/> PARM: CPT

## 2019-07-16 NOTE — TELEPHONE ENCOUNTER
Set up repeat chest x-ray in 1 month    I believe he may have gotten a chest x-ray yesterday at the main clinic

## 2019-07-17 NOTE — PROGRESS NOTES
Patient was educated about the purpose of the wires and what data was being collected.  Patient was informed that the technician may need to enter the room during the night to fix leads or make adjustments to equipment.  Patient started the study on cpap. Leg activity observed throughout the study.    EKG appears to be Bradycardia    Large Eson nasal mask and chin strap in use. Humdification3/Cflex 3    Headbox disconnected and reconnected for a lost in signal. Chest belt adjusted for reduced signal.    Optimal pressure 7 cmH20  Supine rem observed at optimal pressure    Patient stated that cpap was okay    Follow up instructions were provided to the patient

## 2019-07-29 ENCOUNTER — PATIENT MESSAGE (OUTPATIENT)
Dept: INTERNAL MEDICINE | Facility: CLINIC | Age: 54
End: 2019-07-29

## 2019-08-02 ENCOUNTER — PATIENT MESSAGE (OUTPATIENT)
Dept: INTERNAL MEDICINE | Facility: CLINIC | Age: 54
End: 2019-08-02

## 2019-08-02 DIAGNOSIS — G47.33 OSA (OBSTRUCTIVE SLEEP APNEA): Primary | ICD-10-CM

## 2019-08-02 NOTE — TELEPHONE ENCOUNTER
Call patient    I sent him a my [employer] message    The CPAP titration trial results came in, sleep apnea was corrected with the use of CPAP, orders are in to set this up at home

## 2019-08-12 ENCOUNTER — PATIENT MESSAGE (OUTPATIENT)
Dept: INTERNAL MEDICINE | Facility: CLINIC | Age: 54
End: 2019-08-12

## 2019-08-16 ENCOUNTER — PATIENT MESSAGE (OUTPATIENT)
Dept: INTERNAL MEDICINE | Facility: CLINIC | Age: 54
End: 2019-08-16

## 2019-08-19 ENCOUNTER — HOSPITAL ENCOUNTER (OUTPATIENT)
Dept: RADIOLOGY | Facility: HOSPITAL | Age: 54
Discharge: HOME | End: 2019-08-19
Attending: INTERNAL MEDICINE
Payer: COMMERCIAL

## 2019-08-19 DIAGNOSIS — R91.8 PULMONARY INFILTRATE: ICD-10-CM

## 2019-08-19 PROCEDURE — 71046 X-RAY EXAM CHEST 2 VIEWS: CPT | Mod: 26,,, | Performed by: RADIOLOGY

## 2019-08-19 PROCEDURE — 71046 XR CHEST PA AND LATERAL: ICD-10-PCS | Mod: 26,,, | Performed by: RADIOLOGY

## 2019-08-19 PROCEDURE — 71046 X-RAY EXAM CHEST 2 VIEWS: CPT | Mod: TC

## 2019-08-26 ENCOUNTER — PATIENT MESSAGE (OUTPATIENT)
Dept: INTERNAL MEDICINE | Facility: CLINIC | Age: 54
End: 2019-08-26

## 2019-08-28 ENCOUNTER — TELEPHONE (OUTPATIENT)
Dept: INTERNAL MEDICINE | Facility: CLINIC | Age: 54
End: 2019-08-28

## 2019-08-28 ENCOUNTER — PATIENT MESSAGE (OUTPATIENT)
Dept: INTERNAL MEDICINE | Facility: CLINIC | Age: 54
End: 2019-08-28

## 2019-08-28 DIAGNOSIS — G47.33 OSA (OBSTRUCTIVE SLEEP APNEA): Primary | ICD-10-CM

## 2019-08-28 NOTE — TELEPHONE ENCOUNTER
Pressure setting need to be change on cpap order Chuck sent a message to you in regards to the change

## 2019-08-29 ENCOUNTER — PATIENT MESSAGE (OUTPATIENT)
Dept: INTERNAL MEDICINE | Facility: CLINIC | Age: 54
End: 2019-08-29

## 2019-08-29 DIAGNOSIS — G47.33 OSA (OBSTRUCTIVE SLEEP APNEA): Primary | ICD-10-CM

## 2020-02-17 ENCOUNTER — PATIENT OUTREACH (OUTPATIENT)
Dept: ADMINISTRATIVE | Facility: HOSPITAL | Age: 55
End: 2020-02-17

## 2020-02-18 ENCOUNTER — OFFICE VISIT (OUTPATIENT)
Dept: INTERNAL MEDICINE | Facility: CLINIC | Age: 55
End: 2020-02-18
Payer: COMMERCIAL

## 2020-02-18 VITALS
HEIGHT: 71 IN | WEIGHT: 262 LBS | SYSTOLIC BLOOD PRESSURE: 120 MMHG | BODY MASS INDEX: 36.68 KG/M2 | OXYGEN SATURATION: 98 % | HEART RATE: 78 BPM | DIASTOLIC BLOOD PRESSURE: 78 MMHG

## 2020-02-18 DIAGNOSIS — M75.22 BICIPITAL TENDONITIS OF LEFT SHOULDER: ICD-10-CM

## 2020-02-18 DIAGNOSIS — R05.8 ALLERGIC COUGH: Primary | ICD-10-CM

## 2020-02-18 PROCEDURE — 99214 OFFICE O/P EST MOD 30 MIN: CPT | Mod: S$GLB,,, | Performed by: INTERNAL MEDICINE

## 2020-02-18 PROCEDURE — 99999 PR PBB SHADOW E&M-EST. PATIENT-LVL III: CPT | Mod: PBBFAC,,, | Performed by: INTERNAL MEDICINE

## 2020-02-18 PROCEDURE — 3008F PR BODY MASS INDEX (BMI) DOCUMENTED: ICD-10-PCS | Mod: CPTII,S$GLB,, | Performed by: INTERNAL MEDICINE

## 2020-02-18 PROCEDURE — 99214 PR OFFICE/OUTPT VISIT, EST, LEVL IV, 30-39 MIN: ICD-10-PCS | Mod: S$GLB,,, | Performed by: INTERNAL MEDICINE

## 2020-02-18 PROCEDURE — 3008F BODY MASS INDEX DOCD: CPT | Mod: CPTII,S$GLB,, | Performed by: INTERNAL MEDICINE

## 2020-02-18 PROCEDURE — 99999 PR PBB SHADOW E&M-EST. PATIENT-LVL III: ICD-10-PCS | Mod: PBBFAC,,, | Performed by: INTERNAL MEDICINE

## 2020-02-18 RX ORDER — DICLOFENAC SODIUM 75 MG/1
75 TABLET, DELAYED RELEASE ORAL 2 TIMES DAILY
Qty: 20 TABLET | Refills: 0 | Status: SHIPPED | OUTPATIENT
Start: 2020-02-18 | End: 2020-02-18 | Stop reason: ALTCHOICE

## 2020-02-18 RX ORDER — HYDROCODONE BITARTRATE AND HOMATROPINE METHYLBROMIDE ORAL SOLUTION 5; 1.5 MG/5ML; MG/5ML
LIQUID ORAL
Qty: 250 ML | Refills: 0 | Status: SHIPPED | OUTPATIENT
Start: 2020-02-18 | End: 2020-09-30

## 2020-02-18 RX ORDER — PREDNISONE 20 MG/1
TABLET ORAL
Qty: 12 TABLET | Refills: 0 | Status: SHIPPED | OUTPATIENT
Start: 2020-02-18 | End: 2020-09-30

## 2020-02-18 NOTE — PROGRESS NOTES
54-year-old male    For 3 weeks been having a persistent cough, it is mainly dry nonproductive but for the past few days  productive of clear mucus  No shortness of breath, no wheezing  No fever chills, sore throat and ear pain  May be mild postnasal drip  Been taking DayQuil in the morning and leftover Hycodan at night  He has had a history of allergic cough and history of pneumonia a year ago    Also for couple months, pain involving the left shoulder particular in reaching forward or upwards    Examination  Vital signs per epic  Tympanic membranes normal  Nasal mucosa is clear  Or fax normal findings  Neck no thyromegaly no masses  Lungs clear  Heart regular rate and rhythm  No adenopathy  Tenderness over the bicipital groove particularly with internal rotation    Impression  Allergic cough with probably allergic rhinitis  Bicipital tendonitis    Plan  Prednisone taper over 8 days  Diclofenac sent for mg twice a day for 10 days  Cold pack over the area  If no improvement referral to Orthopedics

## 2020-04-21 DIAGNOSIS — Z01.84 ANTIBODY RESPONSE EXAMINATION: ICD-10-CM

## 2020-04-29 ENCOUNTER — LAB VISIT (OUTPATIENT)
Dept: LAB | Facility: HOSPITAL | Age: 55
End: 2020-04-29
Attending: INTERNAL MEDICINE
Payer: COMMERCIAL

## 2020-04-29 DIAGNOSIS — Z01.84 ANTIBODY RESPONSE EXAMINATION: ICD-10-CM

## 2020-04-29 LAB — SARS-COV-2 IGG SERPL QL IA: NEGATIVE

## 2020-04-29 PROCEDURE — 86769 SARS-COV-2 COVID-19 ANTIBODY: CPT

## 2020-04-29 PROCEDURE — 36415 COLL VENOUS BLD VENIPUNCTURE: CPT

## 2020-05-22 ENCOUNTER — PATIENT MESSAGE (OUTPATIENT)
Dept: INTERNAL MEDICINE | Facility: CLINIC | Age: 55
End: 2020-05-22

## 2020-05-22 DIAGNOSIS — D17.21 LIPOMA OF RIGHT UPPER EXTREMITY: Primary | ICD-10-CM

## 2020-05-25 ENCOUNTER — PATIENT OUTREACH (OUTPATIENT)
Dept: ADMINISTRATIVE | Facility: OTHER | Age: 55
End: 2020-05-25

## 2020-05-27 ENCOUNTER — OFFICE VISIT (OUTPATIENT)
Dept: SURGERY | Facility: CLINIC | Age: 55
End: 2020-05-27
Payer: COMMERCIAL

## 2020-05-27 VITALS
WEIGHT: 275.25 LBS | DIASTOLIC BLOOD PRESSURE: 97 MMHG | HEART RATE: 68 BPM | BODY MASS INDEX: 38.39 KG/M2 | SYSTOLIC BLOOD PRESSURE: 150 MMHG | OXYGEN SATURATION: 97 % | TEMPERATURE: 98 F

## 2020-05-27 DIAGNOSIS — D17.21 LIPOMA OF RIGHT UPPER EXTREMITY: ICD-10-CM

## 2020-05-27 PROCEDURE — 88304 PR  SURG PATH,LEVEL III: ICD-10-PCS | Mod: 26,,, | Performed by: PATHOLOGY

## 2020-05-27 PROCEDURE — 3008F BODY MASS INDEX DOCD: CPT | Mod: CPTII,,, | Performed by: SURGERY

## 2020-05-27 PROCEDURE — 99999 PR PBB SHADOW E&M-EST. PATIENT-LVL III: ICD-10-PCS | Mod: PBBFAC,,, | Performed by: SURGERY

## 2020-05-27 PROCEDURE — 99999 PR PBB SHADOW E&M-EST. PATIENT-LVL III: CPT | Mod: PBBFAC,,, | Performed by: SURGERY

## 2020-05-27 PROCEDURE — 11404 PR EXC SKIN BENIG 3.1-4 CM TRUNK,ARM,LEG: ICD-10-PCS | Mod: S$GLB,,, | Performed by: SURGERY

## 2020-05-27 PROCEDURE — 99213 PR OFFICE/OUTPT VISIT, EST, LEVL III, 20-29 MIN: ICD-10-PCS | Mod: 25,S$GLB,, | Performed by: SURGERY

## 2020-05-27 PROCEDURE — 12032 PR LAYR CLOS WND TRUNK,ARM,LEG 2.6-7.5 CM: ICD-10-PCS | Mod: 51,S$GLB,, | Performed by: SURGERY

## 2020-05-27 PROCEDURE — 88304 TISSUE EXAM BY PATHOLOGIST: CPT | Mod: 26,,, | Performed by: PATHOLOGY

## 2020-05-27 PROCEDURE — 99213 OFFICE O/P EST LOW 20 MIN: CPT | Mod: 25,S$GLB,, | Performed by: SURGERY

## 2020-05-27 PROCEDURE — 3008F PR BODY MASS INDEX (BMI) DOCUMENTED: ICD-10-PCS | Mod: CPTII,,, | Performed by: SURGERY

## 2020-05-27 PROCEDURE — 12032 INTMD RPR S/A/T/EXT 2.6-7.5: CPT | Mod: 51,S$GLB,, | Performed by: SURGERY

## 2020-05-27 PROCEDURE — 11404 EXC TR-EXT B9+MARG 3.1-4 CM: CPT | Mod: S$GLB,,, | Performed by: SURGERY

## 2020-05-27 PROCEDURE — 88304 TISSUE EXAM BY PATHOLOGIST: CPT | Performed by: PATHOLOGY

## 2020-05-27 NOTE — PROGRESS NOTES
History & Physical    SUBJECTIVE:     History of Present Illness:  Patient is a 54 y.o. male presents with left elbow lipoma.    No other significant medical history. No blood thinners. No allergies.    No chief complaint on file.      Review of patient's allergies indicates:   Allergen Reactions    Erythromycin     Macrolide antibiotics        Current Outpatient Medications   Medication Sig Dispense Refill    albuterol (PROVENTIL/VENTOLIN HFA) 90 mcg/actuation inhaler Inhale 2 puffs into the lungs every 6 (six) hours as needed for Wheezing. 1 each 1    hydrocodone-homatropine 5-1.5 mg/5 ml (HYCODAN) 5-1.5 mg/5 mL Syrp Take 5ml to 10ml by mouth every four hours as needed for cough 250 mL 0    neomycin-polymyxin-hydrocortisone (CORTISPORIN) 3.5-10,000-1 mg/mL-unit/mL-% otic suspension Place 3 drops into the right ear 3 (three) times daily. 10 mL 0    predniSONE (DELTASONE) 20 MG tablet Take 2 pills by mouth daily for 4 days, then 1 pill by mouth day for 4 days 12 tablet 0    triamcinolone acetonide 0.1% (KENALOG) 0.1 % cream Apply topically 2 (two) times daily. for 10 days 15 g 1    WIXELA INHUB 250-50 mcg/dose diskus inhaler INHALE 1 PUFF INTO THE LUNGS TWICE DAILY 60 each 1     Current Facility-Administered Medications   Medication Dose Route Frequency Provider Last Rate Last Dose    cefTRIAXone injection 1 g  1 g Intramuscular Q24H Anthony Wyman MD   1 g at 06/21/19 1556       Past Medical History:   Diagnosis Date    Allergy     Eczema      Past Surgical History:   Procedure Laterality Date    COLONOSCOPY N/A 2/15/2016    Procedure: COLONOSCOPY;  Surgeon: STONE Sanchez MD;  Location: [address]);  Service: Endoscopy;  Laterality: N/A;  PM Prep    HERNIA REPAIR       Family History   Problem Relation Age of Onset    Cancer Mother         lung    Breast cancer Mother     Cancer Father         liver    No Known Problems Sister     No Known Problems Brother     No Known Problems  Sister      Social History     Tobacco Use    Smoking status: Never Smoker   Substance Use Topics    Alcohol use: Yes     Frequency: Monthly or less     Drinks per session: 1 or 2     Binge frequency: Never     Comment: limited    Drug use: Not on file        Review of Systems:  Review of Systems   Constitutional: Negative.    HENT: Negative.    Eyes: Negative.    Respiratory: Negative.    Cardiovascular: Negative.    Gastrointestinal: Negative.    Endocrine: Negative.    Genitourinary: Negative.    Musculoskeletal: Negative.    Skin: Negative.    Allergic/Immunologic: Negative.    Neurological: Negative.    Hematological: Negative.    Psychiatric/Behavioral: Negative.    All other systems reviewed and are negative.      OBJECTIVE:     Vital Signs (Most Recent)  Temp: 98.3 °F (36.8 °C) (05/27/20 1114)  Pulse: 68 (05/27/20 1114)  BP: (!) 150/97 (05/27/20 1114)  SpO2: 97 % (05/27/20 1114)     124.9 kg (275 lb 3.9 oz)     Physical Exam:  Physical Exam   Constitutional: He appears well-developed.   Cardiovascular: Normal rate and regular rhythm.   Pulmonary/Chest: Effort normal. No respiratory distress.   Musculoskeletal: He exhibits no edema.   Lefty elbow lipoma, soft and mobile   Vitals reviewed.      Laboratory  none    Diagnostic Results:  lipoma    ASSESSMENT/PLAN:     54M with lipoma    Consented  Minors today    DEANNA Young MD   General Surgery, PGY-2  Pager: [phone]

## 2020-05-27 NOTE — LETTER
May 29, 2020      Anthony Wyman MD  [address]  [place] [address]           [facility] - General Surgery  [address]  [place] [address]  Phone: [phone]          Patient: Naldo Barakat   MR Number: [id number]   YOB: 1965   Date of Visit: 5/27/2020       Dear Dr. Anthony Wyman:    Thank you for referring Naldo Barakat to me for evaluation. Attached you will find relevant portions of my assessment and plan of care.    If you have questions, please do not hesitate to call me. I look forward to following Naldo Barakat along with you.    Sincerely,    Benjamín Daniel MD    Enclosure  CC:  No Recipients    If you would like to receive this communication electronically, please contact [email or web address] or [phone] to request more information on [employer] Link access.    For providers and/or their staff who would like to refer a patient to [facility], please contact us through our one-stop-shop provider referral line, [facility], at [phone].    If you feel you have received this communication in error or would no longer like to receive these types of communications, please e-mail [email or web address]

## 2020-05-29 LAB
FINAL PATHOLOGIC DIAGNOSIS: NORMAL
GROSS: NORMAL

## 2020-05-29 NOTE — PROGRESS NOTES
PREOPERATIVE DIAGNOSIS:    1. Lipoma of right upper extremity  Ambulatory referral/consult to General Surgery    Specimen to Pathology Soft tissue, tumors, and Sarcoma       POSTOPERATIVE DIAGNOSIS:   1. Lipoma of right upper extremity  Ambulatory referral/consult to General Surgery    Specimen to Pathology Soft tissue, tumors, and Sarcoma       PROCEDURE PERFORMED: excison right arm lipoma 4cm         Intermediate level wound closure    ATTENDING SURGEON: Benjamín Daniel MD      HOUSESTAFF SURGEON:Hector    ANESTHESIA:  Local    ESTIMATED BLOOD LOSS: 3 ml    FINDINGS: lipoma    INDICATIONS: Naldo Barakat is a 54 y.o. male referred to my General Surgery Clinic with soft tissue mass in the right mid arm near elbow.  We recommended excisional biopsy.  This procedure has been fully reviewed with the patient and written informed consent has been obtained.      PROCEDURE IN DETAIL: The patient was identified and brought back to minor   procedure room in the General Surgery Clinic. Patient was positioned appropriately   and prepped and draped sterilely. Local anesthesia was administered and a 4cm skin incision was made with the scalpel. Subcutaneous tissue was divided sharply and the lipoma was sharply excised away from the normal surrounding   subcutaneous tissue sharply, was passed off the field as specimen. Hemostasis   was obtained. The wound was closed in layers including vicryl deep and monocryl on the skin. A sterile dressing was applied. The patient tolerated the procedure well, was discharged from the minor procedure room and will follow up in two weeks.

## 2020-06-09 RX ORDER — SULFAMETHOXAZOLE AND TRIMETHOPRIM 800; 160 MG/1; MG/1
1 TABLET ORAL 2 TIMES DAILY
Qty: 10 TABLET | Refills: 0 | Status: SHIPPED | OUTPATIENT
Start: 2020-06-09 | End: 2020-09-30

## 2020-06-09 NOTE — PROGRESS NOTES
GENERAL SURGERY  PROGRESS NOTE      Seen after excision of lipoma on right elbow. Developed irritation, possible wound infection. 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            The monocryl suture was out at the end of the incision. This was clipped.    5 days bactim.    F/u prn    DEANNA Young MD   General Surgery, PGY-2  Pager: [phone]

## 2020-08-26 ENCOUNTER — HOSPITAL ENCOUNTER (OUTPATIENT)
Dept: RADIOLOGY | Facility: HOSPITAL | Age: 55
Discharge: HOME | End: 2020-08-26
Attending: INTERNAL MEDICINE
Payer: COMMERCIAL

## 2020-08-26 DIAGNOSIS — G89.29 CHRONIC LEFT SHOULDER PAIN: ICD-10-CM

## 2020-08-26 DIAGNOSIS — M25.512 CHRONIC LEFT SHOULDER PAIN: ICD-10-CM

## 2020-08-26 DIAGNOSIS — M77.8 LEFT SHOULDER TENDINITIS: ICD-10-CM

## 2020-08-26 PROCEDURE — 73030 X-RAY EXAM OF SHOULDER: CPT | Mod: TC,LT

## 2020-08-26 PROCEDURE — 73030 X-RAY EXAM OF SHOULDER: CPT | Mod: 26,LT,, | Performed by: RADIOLOGY

## 2020-08-26 PROCEDURE — 73030 XR SHOULDER COMPLETE 2 OR MORE VIEWS LEFT: ICD-10-PCS | Mod: 26,LT,, | Performed by: RADIOLOGY

## 2020-08-28 ENCOUNTER — OFFICE VISIT (OUTPATIENT)
Dept: SPORTS MEDICINE | Facility: CLINIC | Age: 55
End: 2020-08-28
Payer: COMMERCIAL

## 2020-08-28 ENCOUNTER — PATIENT OUTREACH (OUTPATIENT)
Dept: ADMINISTRATIVE | Facility: OTHER | Age: 55
End: 2020-08-28

## 2020-08-28 ENCOUNTER — HOSPITAL ENCOUNTER (OUTPATIENT)
Dept: RADIOLOGY | Facility: HOSPITAL | Age: 55
Discharge: HOME | End: 2020-08-28
Attending: PHYSICIAN ASSISTANT
Payer: COMMERCIAL

## 2020-08-28 VITALS
HEART RATE: 57 BPM | HEIGHT: 71 IN | SYSTOLIC BLOOD PRESSURE: 139 MMHG | DIASTOLIC BLOOD PRESSURE: 96 MMHG | BODY MASS INDEX: 37.78 KG/M2 | WEIGHT: 269.88 LBS

## 2020-08-28 DIAGNOSIS — M25.512 LEFT SHOULDER PAIN, UNSPECIFIED CHRONICITY: ICD-10-CM

## 2020-08-28 DIAGNOSIS — M77.8 LEFT SHOULDER TENDINITIS: ICD-10-CM

## 2020-08-28 DIAGNOSIS — M25.512 CHRONIC LEFT SHOULDER PAIN: Primary | ICD-10-CM

## 2020-08-28 DIAGNOSIS — G89.29 CHRONIC LEFT SHOULDER PAIN: Primary | ICD-10-CM

## 2020-08-28 PROCEDURE — 99999 PR PBB SHADOW E&M-EST. PATIENT-LVL IV: ICD-10-PCS | Mod: PBBFAC,,, | Performed by: PHYSICIAN ASSISTANT

## 2020-08-28 PROCEDURE — 3008F PR BODY MASS INDEX (BMI) DOCUMENTED: ICD-10-PCS | Mod: CPTII,S$GLB,, | Performed by: PHYSICIAN ASSISTANT

## 2020-08-28 PROCEDURE — 3008F BODY MASS INDEX DOCD: CPT | Mod: CPTII,S$GLB,, | Performed by: PHYSICIAN ASSISTANT

## 2020-08-28 PROCEDURE — 99999 PR PBB SHADOW E&M-EST. PATIENT-LVL IV: CPT | Mod: PBBFAC,,, | Performed by: PHYSICIAN ASSISTANT

## 2020-08-28 PROCEDURE — 99204 OFFICE O/P NEW MOD 45 MIN: CPT | Mod: S$GLB,,, | Performed by: PHYSICIAN ASSISTANT

## 2020-08-28 PROCEDURE — 99204 PR OFFICE/OUTPT VISIT, NEW, LEVL IV, 45-59 MIN: ICD-10-PCS | Mod: S$GLB,,, | Performed by: PHYSICIAN ASSISTANT

## 2020-08-28 RX ORDER — MELOXICAM 15 MG/1
15 TABLET ORAL DAILY
Qty: 30 TABLET | Refills: 2 | Status: SHIPPED | OUTPATIENT
Start: 2020-08-28 | End: 2023-03-20

## 2020-08-28 NOTE — PROGRESS NOTES
CC: LEFT shoulder pain     55 y.o. Male with a history of left shoulder pain who presents for initial evaluation as a new patient.  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  Patient denies any specific mechanism of injury or trauma that precipitated this pain, but states he has had continued difficulty with overhead movement and feels that the pain is more frequent.  Patient states that about 3 weeks ago he was performing a freestyle swimming stroke when he felt sharp left shoulder pain deep inside his shoulder.  Patient states that this since calmed down.  He also reports difficulty reaching behind his back.  Denies any associated numbness or tingling.  Patient states that he also has difficulty sleeping on his left side.  Thus far treatment has included over-the-counter Aleve twice daily, and icing with only minimal relief.  He has not tried any physical therapy or injections this point.    He reports that the pain and weakness is worse with overhead activity. It also bothers him at night.    Is affecting ADLs.  Pain is 7/10 at it's worst.      Past Medical History:   Diagnosis Date    Allergy     Eczema        Past Surgical History:   Procedure Laterality Date    COLONOSCOPY N/A 2/15/2016    Procedure: COLONOSCOPY;  Surgeon: STONE Sanchez MD;  Location: [facility] ([address] [place] [address]);  Service: Endoscopy;  Laterality: N/A;  PM Prep    HERNIA REPAIR         Family History   Problem Relation Age of Onset    Cancer Mother         lung    Breast cancer Mother     Cancer Father         liver    No Known Problems Sister     No Known Problems Brother     No Known Problems Sister          Current Outpatient Medications:     hydrocodone-homatropine 5-1.5 mg/5 ml (HYCODAN) 5-1.5 mg/5 mL Syrp, Take 5ml to 10ml by mouth every four hours as needed for cough, Disp: 250 mL, Rfl: 0     predniSONE (DELTASONE) 20 MG tablet, Take 2 pills by mouth daily for 4 days, then 1 pill by mouth day for 4 days, Disp: 12 tablet, Rfl: 0    sulfamethoxazole-trimethoprim 800-160mg (BACTRIM DS) 800-160 mg Tab, Take 1 tablet by mouth 2 (two) times daily., Disp: 10 tablet, Rfl: 0    albuterol (PROVENTIL/VENTOLIN HFA) 90 mcg/actuation inhaler, Inhale 2 puffs into the lungs every 6 (six) hours as needed for Wheezing., Disp: 1 each, Rfl: 1    neomycin-polymyxin-hydrocortisone (CORTISPORIN) 3.5-10,000-1 mg/mL-unit/mL-% otic suspension, Place 3 drops into the right ear 3 (three) times daily., Disp: 10 mL, Rfl: 0    triamcinolone acetonide 0.1% (KENALOG) 0.1 % cream, Apply topically 2 (two) times daily. for 10 days, Disp: 15 g, Rfl: 1    WIXELA INHUB 250-50 mcg/dose diskus inhaler, INHALE 1 PUFF INTO THE LUNGS TWICE DAILY, Disp: 60 each, Rfl: 1    Current Facility-Administered Medications:     cefTRIAXone injection 1 g, 1 g, Intramuscular, Q24H, Anthony Wyman MD, 1 g at 06/21/19 9250    Review of patient's allergies indicates:   Allergen Reactions    Erythromycin     Macrolide antibiotics           REVIEW OF SYSTEMS:  Constitution: Negative. Negative for chills, fever and night sweats.   HENT: Negative for congestion and headaches.    Eyes: Negative for blurred vision, left vision loss and right vision loss.   Cardiovascular: Negative for chest pain and syncope.   Respiratory: Negative for cough and shortness of breath.    Endocrine: Negative for polydipsia, polyphagia and polyuria.   Hematologic/Lymphatic: Negative for bleeding problem. Does not bruise/bleed easily.   Skin: Negative for dry skin, itching and rash.   Musculoskeletal: Negative for falls.  Positive for left shoulder pain and muscle weakness.   Gastrointestinal: Negative for abdominal pain and bowel incontinence.   Genitourinary: Negative for bladder incontinence and nocturia.   Neurological: Negative for disturbances in coordination, loss of  "balance and seizures.   Psychiatric/Behavioral: Negative for depression. The patient does not have insomnia.    Allergic/Immunologic: Negative for hives and persistent infections.      PHYSICAL EXAMINATION:  Vitals:  BP (!) 139/96   Pulse (!) 57   Ht 5' 11" (1.803 m)   Wt 122.4 kg (269 lb 14.4 oz)   BMI 37.64 kg/m²    General: The patient is alert and oriented x 3.  Mood is pleasant.  Observation of ears, eyes and nose reveal no gross abnormalities.  No labored breathing observed.  Gait is coordinated. Patient can toe walk and heel walk without difficulty.      LEFT Shoulder / Upper Extremity Exam    OBSERVATION:     Swelling  none  Deformity  none   Discoloration  none   Scapular winging none   Scars   none  Atrophy  none    TENDERNESS / CREPITUS (T/C):          T/C      T/C   Clavicle   -/-  SUPRAspinatus    +/-     AC Jt.    +/-  INFRAspinatus  -/-    SC Jt.    -/-  Deltoid    -/-      G. Tuberosity  -/-  LH BICEP groove  +/-   Acromion:  -/-  Midline Neck   -/-     Scapular Spine -/-  Trapezium   -/-   SMA Scapula  -/-  GH jt. line - post  +/-     Scapulothoracic  -/-         ROM: (* = with pain)  Right shoulder   Left shoulder        AROM (PROM)   AROM (PROM)   FE    170° (175°)     160°* (165°*)     ER at 0°    50°  (60°)    30°  (65°)    IR (spine level)   T10     Sacrum*    STRENGTH: (* = with pain) Right shoulder   Left shoulder    SCAPTION   5/5    4+/5*    IR    5/5    5/5   ER    5/5    5/5   BICEPS   5/5    5/5   Deltoid    5/5    4+/5*     SIGNS:  Painful side       NEER   +    ORENETTAS  +    PATHAK   +    SPEEDS  neg     DROP ARM   +   BELLY PRESS neg   Superior escape none    LIFT-OFF  neg   X-Body ADD    neg    MOVING VALGUS neg        STABILITY TESTING    Right shoulder   Left shoulder    Translation     Anterior  up face     up face    Posterior  up face    up face    Sulcus   < 10mm    < 10 mm     Signs   Apprehension   neg      neg       Relocation   no change     no change      Jerk " test  neg     neg    EXTREMITY NEURO-VASCULAR EXAM:    Sensation grossly intact to light touch all dermatomal regions.    DTR 2+ Biceps, Triceps, BR and Negative Fitos sign   Grossly intact motor function at Elbow, Wrist and Hand   Distal pulses radial and ulnar 2+, brisk cap refill, symmetric.      NECK:  Painless FROM and spinous processes non-tender. Negative Spurlings sign.      OTHER FINDINGS:  N/A     XRAYS left shoulder (08/26/2020):  FINDINGS:  Bones: No fracture.  No lytic or blastic lesion.     Joints: No evidence for glenohumeral dislocation.  Acromioclavicular joint is unremarkable.     Soft tissues: Unremarkable.  No calcifications identified to suggest calcific tendinitis.          ASSESSMENT:     Left shoulder pain, possible rotator cuff pathology      PLAN:      1.  Prior obtained radiographs reviewed with patient in detail.    2. MRI to rule out rotator cuff pathology    3.  Mobic 15 mg 1 p.o. q.d. prescribed to patient's pharmacy.  Advised patient to discontinue all other anti-inflammatories while taking Mobic.  He may safely alternate with Tylenol if needed.    4. HEP 44614 - Royer Hauser instructed and demonstrated a shoulder/rotator cuff HEP. The patient then demonstrated understanding of exercises and proper technique. This program was performed for 5 minutes.     5.  Discussed other conservative management options including activity modification, icing after activity, home exercises, formal physical therapy, topical analgesics, and injections.    6. Follow up in clinic to discuss MRI results    All questions were answered, patient will contact us for questions or concerns in the interim.      Medical Dictation software was used during the dictation of portions or the entirety of this medical record.  Phonetic or grammatic errors may exist due to the use of this software. For clarification, refer to the author of the document.

## 2020-08-28 NOTE — LETTER
August 28, 2020      Anthony Wyman MD  [address]  [facility] [address]           [facility]  [address]  [facility] [address]  Phone: [phone]          Patient: Naldo Barakat   MR Number: [id number]   YOB: 1965   Date of Visit: 8/28/2020       Dear Dr. Anthony Wyman:    Thank you for referring Naldo Barakat to me for evaluation. Attached you will find relevant portions of my assessment and plan of care.    If you have questions, please do not hesitate to call me. I look forward to following Naldo Barakat along with you.    Sincerely,    Mikhail Cooper PA-C    Enclosure  CC:  No Recipients    If you would like to receive this communication electronically, please contact [email or web address] or [phone] to request more information on [employer] Link access.    For providers and/or their staff who would like to refer a patient to [facility], please contact us through our one-stop-shop provider referral line, [facility], at [phone].    If you feel you have received this communication in error or would no longer like to receive these types of communications, please e-mail [email or web address]

## 2020-09-05 ENCOUNTER — HOSPITAL ENCOUNTER (OUTPATIENT)
Dept: RADIOLOGY | Facility: HOSPITAL | Age: 55
Discharge: HOME | End: 2020-09-05
Attending: PHYSICIAN ASSISTANT
Payer: COMMERCIAL

## 2020-09-05 DIAGNOSIS — G89.29 CHRONIC LEFT SHOULDER PAIN: ICD-10-CM

## 2020-09-05 DIAGNOSIS — M25.512 CHRONIC LEFT SHOULDER PAIN: ICD-10-CM

## 2020-09-05 PROCEDURE — 73221 MRI JOINT UPR EXTREM W/O DYE: CPT | Mod: 26,LT,, | Performed by: RADIOLOGY

## 2020-09-05 PROCEDURE — 73221 MRI SHOULDER WITHOUT CONTRAST LEFT: ICD-10-PCS | Mod: 26,LT,, | Performed by: RADIOLOGY

## 2020-09-05 PROCEDURE — 73221 MRI JOINT UPR EXTREM W/O DYE: CPT | Mod: TC,LT

## 2020-09-08 ENCOUNTER — OFFICE VISIT (OUTPATIENT)
Dept: SPORTS MEDICINE | Facility: CLINIC | Age: 55
End: 2020-09-08
Payer: COMMERCIAL

## 2020-09-08 VITALS
HEART RATE: 60 BPM | BODY MASS INDEX: 38.01 KG/M2 | WEIGHT: 271.5 LBS | HEIGHT: 71 IN | SYSTOLIC BLOOD PRESSURE: 152 MMHG | DIASTOLIC BLOOD PRESSURE: 95 MMHG

## 2020-09-08 DIAGNOSIS — M25.512 CHRONIC LEFT SHOULDER PAIN: Primary | ICD-10-CM

## 2020-09-08 DIAGNOSIS — G89.29 CHRONIC LEFT SHOULDER PAIN: Primary | ICD-10-CM

## 2020-09-08 DIAGNOSIS — M75.02 ADHESIVE CAPSULITIS OF LEFT SHOULDER: ICD-10-CM

## 2020-09-08 PROCEDURE — 99212 OFFICE O/P EST SF 10 MIN: CPT | Mod: 25,S$GLB,, | Performed by: PHYSICIAN ASSISTANT

## 2020-09-08 PROCEDURE — 3008F BODY MASS INDEX DOCD: CPT | Mod: CPTII,S$GLB,, | Performed by: PHYSICIAN ASSISTANT

## 2020-09-08 PROCEDURE — 99999 PR PBB SHADOW E&M-EST. PATIENT-LVL IV: CPT | Mod: PBBFAC,,, | Performed by: PHYSICIAN ASSISTANT

## 2020-09-08 PROCEDURE — 20610 LARGE JOINT ASPIRATION/INJECTION: L SUBACROMIAL BURSA: ICD-10-PCS | Mod: LT,S$GLB,, | Performed by: PHYSICIAN ASSISTANT

## 2020-09-08 PROCEDURE — 99999 PR PBB SHADOW E&M-EST. PATIENT-LVL IV: ICD-10-PCS | Mod: PBBFAC,,, | Performed by: PHYSICIAN ASSISTANT

## 2020-09-08 PROCEDURE — 20610 DRAIN/INJ JOINT/BURSA W/O US: CPT | Mod: LT,S$GLB,, | Performed by: PHYSICIAN ASSISTANT

## 2020-09-08 PROCEDURE — 99212 PR OFFICE/OUTPT VISIT, EST, LEVL II, 10-19 MIN: ICD-10-PCS | Mod: 25,S$GLB,, | Performed by: PHYSICIAN ASSISTANT

## 2020-09-08 PROCEDURE — 3008F PR BODY MASS INDEX (BMI) DOCUMENTED: ICD-10-PCS | Mod: CPTII,S$GLB,, | Performed by: PHYSICIAN ASSISTANT

## 2020-09-08 RX ORDER — TRIAMCINOLONE ACETONIDE 40 MG/ML
40 INJECTION, SUSPENSION INTRA-ARTICULAR; INTRAMUSCULAR
Status: DISCONTINUED | OUTPATIENT
Start: 2020-09-08 | End: 2020-09-08 | Stop reason: HOSPADM

## 2020-09-08 RX ADMIN — TRIAMCINOLONE ACETONIDE 40 MG: 40 INJECTION, SUSPENSION INTRA-ARTICULAR; INTRAMUSCULAR at 04:09

## 2020-09-08 NOTE — PROGRESS NOTES
CC: LEFT shoulder pain    Current HPI:  Patient presents today for follow-up evaluation of left shoulder pain.  He is here today to discuss left shoulder MRI results.  Patient states that over the weekend he was hitting some tennis balls which aggravated his shoulder, but this since calmed down.  No new issues.  Patient states that he has been taking Mobic 15 mg daily with good relief of pain.  He would like to discuss treatment options moving forward.    Previous HPI (08/28/2020):  55 y.o. Male with a history of left shoulder pain who presents for initial evaluation as a new patient.  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  Patient denies any specific mechanism of injury or trauma that precipitated this pain, but states he has had continued difficulty with overhead movement and feels that the pain is more frequent.  Patient states that about 3 weeks ago he was performing a freestyle swimming stroke when he felt sharp left shoulder pain deep inside his shoulder.  Patient states that this since calmed down.  He also reports difficulty reaching behind his back.  Denies any associated numbness or tingling.  Patient states that he also has difficulty sleeping on his left side.  Thus far treatment has included over-the-counter Aleve twice daily, and icing with only minimal relief.  He has not tried any physical therapy or injections this point.    He reports that the pain and weakness is worse with overhead activity. It also bothers him at night.    Is affecting ADLs.  Pain is 7/10 at it's worst.      Past Medical History:   Diagnosis Date    Allergy     Eczema        Past Surgical History:   Procedure Laterality Date    COLONOSCOPY N/A 2/15/2016    Procedure: COLONOSCOPY;  Surgeon: STONE Sanchez MD;  Location: [facility] ([address] [place] [address]);  Service: Endoscopy;  Laterality: N/A;  PM  Prep    HERNIA REPAIR         Family History   Problem Relation Age of Onset    Cancer Mother         lung    Breast cancer Mother     Cancer Father         liver    No Known Problems Sister     No Known Problems Brother     No Known Problems Sister          Current Outpatient Medications:     albuterol (PROVENTIL/VENTOLIN HFA) 90 mcg/actuation inhaler, Inhale 2 puffs into the lungs every 6 (six) hours as needed for Wheezing., Disp: 1 each, Rfl: 1    hydrocodone-homatropine 5-1.5 mg/5 ml (HYCODAN) 5-1.5 mg/5 mL Syrp, Take 5ml to 10ml by mouth every four hours as needed for cough, Disp: 250 mL, Rfl: 0    meloxicam (MOBIC) 15 MG tablet, Take 1 tablet (15 mg total) by mouth once daily., Disp: 30 tablet, Rfl: 2    neomycin-polymyxin-hydrocortisone (CORTISPORIN) 3.5-10,000-1 mg/mL-unit/mL-% otic suspension, Place 3 drops into the right ear 3 (three) times daily., Disp: 10 mL, Rfl: 0    predniSONE (DELTASONE) 20 MG tablet, Take 2 pills by mouth daily for 4 days, then 1 pill by mouth day for 4 days, Disp: 12 tablet, Rfl: 0    sulfamethoxazole-trimethoprim 800-160mg (BACTRIM DS) 800-160 mg Tab, Take 1 tablet by mouth 2 (two) times daily., Disp: 10 tablet, Rfl: 0    triamcinolone acetonide 0.1% (KENALOG) 0.1 % cream, Apply topically 2 (two) times daily. for 10 days, Disp: 15 g, Rfl: 1    WIXELA INHUB 250-50 mcg/dose diskus inhaler, INHALE 1 PUFF INTO THE LUNGS TWICE DAILY, Disp: 60 each, Rfl: 1    Current Facility-Administered Medications:     cefTRIAXone injection 1 g, 1 g, Intramuscular, Q24H, Anthony Wyman MD, 1 g at 06/21/19 1391    Review of patient's allergies indicates:   Allergen Reactions    Erythromycin     Macrolide antibiotics           REVIEW OF SYSTEMS:  Constitution: Negative. Negative for chills, fever and night sweats.   HENT: Negative for congestion and headaches.    Eyes: Negative for blurred vision, left vision loss and right vision loss.   Cardiovascular: Negative for chest pain and  "syncope.   Respiratory: Negative for cough and shortness of breath.    Endocrine: Negative for polydipsia, polyphagia and polyuria.   Hematologic/Lymphatic: Negative for bleeding problem. Does not bruise/bleed easily.   Skin: Negative for dry skin, itching and rash.   Musculoskeletal: Negative for falls.  Positive for left shoulder pain and muscle weakness.   Gastrointestinal: Negative for abdominal pain and bowel incontinence.   Genitourinary: Negative for bladder incontinence and nocturia.   Neurological: Negative for disturbances in coordination, loss of balance and seizures.   Psychiatric/Behavioral: Negative for depression. The patient does not have insomnia.    Allergic/Immunologic: Negative for hives and persistent infections.      PHYSICAL EXAMINATION:  Vitals:  BP (!) 152/95   Pulse 60   Ht 5' 11" (1.803 m)   Wt 123.2 kg (271 lb 8 oz)   BMI 37.87 kg/m²    General: The patient is alert and oriented x 3.  Mood is pleasant.  Observation of ears, eyes and nose reveal no gross abnormalities.  No labored breathing observed.  Gait is coordinated. Patient can toe walk and heel walk without difficulty.      LEFT Shoulder / Upper Extremity Exam    OBSERVATION:     Swelling  none  Deformity  none   Discoloration  none   Scapular winging none   Scars   none  Atrophy  none    TENDERNESS / CREPITUS (T/C):          T/C      T/C   Clavicle   -/-  SUPRAspinatus    -/-     AC Jt.    +/-  INFRAspinatus  -/-    SC Jt.    -/-  Deltoid    -/-      G. Tuberosity  -/-  LH BICEP groove  +/-   Acromion:  -/-  Midline Neck   -/-     Scapular Spine -/-  Trapezium   -/-   SMA Scapula  -/-  GH jt. line - post  +/-     Scapulothoracic  -/-         ROM: (* = with pain)  Right shoulder   Left shoulder        AROM (PROM)   AROM (PROM)   FE    170° (175°)     160°* (165°*)     ER at 0°    50°  (60°)    30°  (65°)    IR (spine level)   T10     Sacrum*    STRENGTH: (* = with pain) Right shoulder   Left shoulder "    SCAPTION   5/5    4+/5*    IR    5/5    5/5   ER    5/5    5/5   BICEPS   5/5    5/5   Deltoid    5/5    4+/5*     SIGNS:  Painful side       NEER   +    ORENETTAS  +    PATHAK   +    SPEEDS  neg     DROP ARM   +   BELLY PRESS neg   Superior escape none    LIFT-OFF  neg   X-Body ADD    neg    MOVING VALGUS neg        STABILITY TESTING    Right shoulder   Left shoulder    Translation     Anterior  up face     up face    Posterior  up face    up face    Sulcus   < 10mm    < 10 mm     Signs   Apprehension   neg      neg       Relocation   no change     no change      Jerk test  neg     neg    EXTREMITY NEURO-VASCULAR EXAM:    Sensation grossly intact to light touch all dermatomal regions.    DTR 2+ Biceps, Triceps, BR and Negative Fitos sign   Grossly intact motor function at Elbow, Wrist and Hand   Distal pulses radial and ulnar 2+, brisk cap refill, symmetric.      NECK:  Painless FROM and spinous processes non-tender. Negative Spurlings sign.      OTHER FINDINGS:  N/A     XRAYS left shoulder (08/26/2020):  FINDINGS:  Bones: No fracture.  No lytic or blastic lesion.     Joints: No evidence for glenohumeral dislocation.  Acromioclavicular joint is unremarkable.     Soft tissues: Unremarkable.  No calcifications identified to suggest calcific tendinitis.    MRI left shoulder (9/5/2020):  Impression:     No evidence of rotator cuff tear.     Findings suspicious for adhesive capsulitis as detailed above.     Mild AC joint arthropathy.      ASSESSMENT:     Left shoulder pain  Adhesive capsulitis, left shoulder      PLAN:      1.  I made the decision to obtain old records of the patient including previous notes and imaging. I independently reviewed and interpreted the radiographs and/or MRIs today as well as prior imaging. Reviewed MRI and radiograph results with patient in detail.    2.  Left shoulder subacromial CSI performed today in clinic.  See procedure note for details.    3.  Continue Mobic 15 mg 1 p.o.  q.d.  May safely alternate with Tylenol if needed.    4.  Ambulatory referral to formal physical therapy at [facility] location.    5.  Follow up in 6 weeks    All questions were answered, patient will contact us for questions or concerns in the interim.      Medical Dictation software was used during the dictation of portions or the entirety of this medical record.  Phonetic or grammatic errors may exist due to the use of this software. For clarification, refer to the author of the document.

## 2020-09-08 NOTE — PROCEDURES
Large Joint Aspiration/Injection: L subacromial bursa    Date/Time: 9/8/2020 4:00 PM  Performed by: Mikhail Cooper PA-C  Authorized by: Mikhail Cooper PA-C     Consent Done?:  Yes (Verbal)  Indications:  Pain  Site marked: the procedure site was marked    Timeout: prior to procedure the correct patient, procedure, and site was verified    Prep: patient was prepped and draped in usual sterile fashion      Local anesthesia used?: Yes    Local anesthetic:  Topical anesthetic    Details:  Needle Size:  22 G  Ultrasonic Guidance for needle placement?: No    Approach:  Posterior  Location:  Shoulder  Site:  L subacromial bursa  Medications:  40 mg triamcinolone acetonide 40 mg/mL  Patient tolerance:  Patient tolerated the procedure well with no immediate complications    Injection Procedure  A time out was performed, including verification of patient ID, procedure, site and side, availability of information and equipment, review of safety issues, and agreement with consent, the procedure site was marked.    After time out was performed, the patient was prepped aseptically with povidone-iodine swabsticks. A diagnostic and therapeutic injection of 1:3cc Kenalog/Marcaine was given under sterile technique using a 22g x 1.5 needle from the Posterior  aspect of the left Subacromial in the sitting position.      Naldo Barakat had no adverse reactions to the medication. Pain decreased. He was instructed to apply ice to the joint for 20 minutes and avoid strenuous activities for 24-36 hours following the injection. He was warned of possible blood sugar and/or blood pressure changes during that time. Following that time, he can resume regular activities.    He was reminded to call the clinic immediately for any adverse side effects as explained in clinic today.    Exp:  3/31/2022  Lot:  [id number]

## 2020-09-09 ENCOUNTER — CLINICAL SUPPORT (OUTPATIENT)
Dept: REHABILITATION | Facility: HOSPITAL | Age: 55
End: 2020-09-09
Attending: PHYSICIAN ASSISTANT
Payer: COMMERCIAL

## 2020-09-09 DIAGNOSIS — M25.612 DECREASED RANGE OF MOTION OF LEFT SHOULDER: ICD-10-CM

## 2020-09-09 DIAGNOSIS — M25.512 CHRONIC LEFT SHOULDER PAIN: ICD-10-CM

## 2020-09-09 DIAGNOSIS — M75.02 ADHESIVE CAPSULITIS OF LEFT SHOULDER: ICD-10-CM

## 2020-09-09 DIAGNOSIS — G89.29 CHRONIC LEFT SHOULDER PAIN: ICD-10-CM

## 2020-09-09 PROCEDURE — 97140 MANUAL THERAPY 1/> REGIONS: CPT | Mod: PN

## 2020-09-09 PROCEDURE — 97161 PT EVAL LOW COMPLEX 20 MIN: CPT | Mod: PN

## 2020-09-10 NOTE — PATIENT INSTRUCTIONS
ROM: Flexion - Wand (Supine)        Lie on back holding wand. Raise arms over head.   Repeat 20 times per set. Do 2 sets per session. Do 2 sessions per day.    Supine Shoulder Abduction    Lay on your back and place the wand in your hand with the palm facing up towards the ceiling on the afflicted side and with your palm facing down on the non afflicted side. Slowly raise your arm towards your head while making sure to keep your elbow locked throughout the motion. Repeat 20 times per set. Do 2 sets per session. Do 2 sessions per day.    Supine Shoulder ER    Start on your back with your knees bent. Keeping elbow bent to 90 degrees, palm facing up, and some space between your body and elbow, rotate arm away from body (while maintaining these angles) while using unaffected arm to assist. Note: to increase comfort of shoulder place small towel under upper arm of affected shoulder. Repeat 20 times per set. Do 2 sets per session. Do 2 sessions per day.     [email or web address]

## 2020-09-11 PROBLEM — G89.29 CHRONIC LEFT SHOULDER PAIN: Status: ACTIVE | Noted: 2020-09-11

## 2020-09-11 PROBLEM — M25.612 DECREASED RANGE OF MOTION OF LEFT SHOULDER: Status: ACTIVE | Noted: 2020-09-11

## 2020-09-11 PROBLEM — M25.512 CHRONIC LEFT SHOULDER PAIN: Status: ACTIVE | Noted: 2020-09-11

## 2020-09-11 NOTE — PLAN OF CARE
[facility]  Physical Therapy Initial Evaluation    Name: Naldo Barakat  Clinic Number: [id number]    Therapy Diagnosis:   Encounter Diagnoses   Name Primary?    Chronic left shoulder pain     Adhesive capsulitis of left shoulder     Decreased range of motion of left shoulder      Physician: Mikhail Cooper PA-C    Physician Orders: PT Eval and Treat: PT for left shoulder, adhesive capsulitis  Medical Diagnosis from Referral:   M25.512,G89.29 (ICD-10-CM) - Chronic left shoulder pain   M75.02 (ICD-10-CM) - Adhesive capsulitis of left shoulder     Evaluation Date: 9/9/2020  Authorization Period Expiration: 12/31/2020  Plan of Care Expiration: 9/9/2020 to 10/23/2020  Visit # / Visits authorized: 1/60    Time In: 6:33 PM  Time Out: 7:15 PM  Total Billable Time: 43 minutes (Low Complexity Evaluation, 1 MT)    Precautions: Standard    Subjective     Date of onset: ~8 months ago    History of current condition - Naldo reports: he was recently diagnosed with adhesive capsulitis of his left shoulder. States it began insidiously about 8 months ago. His major range of motion restrictions include reaching above head and going into shoulder abduction. Had an MRI performed last Saturday and received a steroid injection yesterday. States that usually when he has pain it lasts for 15 seconds and then quickly subsides.       Medical History:   Past Medical History:   Diagnosis Date    Allergy     Eczema        Surgical History:   Naldo Barakat  has a past surgical history that includes Colonoscopy (N/A, 2/15/2016) and Hernia repair.    Medications:   Naldo has a current medication list which includes the following prescription(s): albuterol, hydrocodone-homatropine 5-1.5 mg/5 ml, meloxicam, neomycin-polymyxin-hydrocortisone, prednisone, sulfamethoxazole-trimethoprim 800-160mg, triamcinolone acetonide 0.1%, and wixela inhub, and the following Facility-Administered Medications: ceftriaxone.    Allergies:  "  Review of patient's allergies indicates:   Allergen Reactions    Erythromycin     Macrolide antibiotics         Imaging:   X-Ray Left Shoulder(8/26/2020):   Bones: No fracture.  No lytic or blastic lesion.  Joints: No evidence for glenohumeral dislocation.  Acromioclavicular joint is unremarkable.  Soft tissues: Unremarkable.  No calcifications identified to suggest calcific tendinitis.    MRI Left Shoulder without Contrast (9/5/2020):   ROTATOR CUFF:   Supraspinatus: Intact.  Infraspinatus:  Intact.  Subscapularis:  Intact.  Teres Minor:  Intact.     Thickened and edematous inferior glenohumeral ligament at the axillary pouch combined with capsular edema and replacement of the rotator interval fat by intermediate signal suggestive of adhesive capsulitis.     LABRUM: Grossly unremarkable on this standard non arthrogram exam.Biceps-labral complex appears unremarkable.     LONG HEAD BICEPS TENDON:  Located and intact with fluid coursing along the tendon sheath.     BONES:  No focal or diffuse abnormality. Mild osseous hypertrophy and edema of the AC joint.Glenoid fossa demonstrates no significant abnormality.     CARTILAGE: Preserved.  No significant arthritic changes.  No intra-articular loose bodies.     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  Prior Level of Function: Independent   Current Level of Function: Independent with pain and some range of motion restrictions    Pain:  Current 0/10, worst 9/10, best 0/10   Location: left anterior shoulder with radiating pain into left bicep   Description: Sharp  Aggravating Factors: Shoulder flexion, shoulder abduction, horizontal adduction  Easing Factors: Mobic, Ice    Pts goals: "To fix my shoulder."    Objective     Posture: Slight forward head, decreased space between LUE and trunk as compared to right " side  Palpation: Tender to palpation along anterior, lateral, and posterior shoulder  Sensation: Intact    Range of Motion/Strength:       CERVICAL AROM Pain/Dysfunction with Movement   Flexion WFL    Extension WFL    Right side bending WFL    Left side bending WFL    Right rotation WFL    Left rotation WFL      In Standing:  Shoulder Right Left Pain/Dysfunction with Movement   AROM/PROM      Flexion  180/180  140/143    Extension  75/85  50/55    Abduction 180/180  123/125    Adduction WFL  WFL    Internal Rotation L3 C7    ER at 90° abd T3 Iliac Crest      Elbow Right Left Pain/Dysfunction with Movement   AROM/PROM      flexion WFL WFL    extension WFL WFL      U/E MMT Right Left Pain/Dysfunction with Movement   Shoulder Flexion 5/5 4+/5    Shoulder Extension 5/5 4/5    Shoulder Abduction 5/5 4/5    Shoulder Adduction 5/5 4/5    Shoulder IR 5/5 4+/5    Shoulder ER  @ 0* Abduction 5/5 4+/5    Elbow Flexion  5/5 5/5    Elbow Extension 5/5 5/5    Rhomboids 5/5 4+/5    Mid Traps 5/5 4+/5    Low Traps 5/5 4+/5      Joint Mobility:   - Shoulder: pain with anterior and inferior left GH glides    Special Tests:     Test Right Left Pain/Dysfunction with Movement   Speed's Test (-) (-)    Neer's Impingement Test (-) (+)    Empty Can Test (-) (+)    Drop Arm Test (-) (+)    Alegria-Willie Test (-) (+)          CMS Impairment/Limitation/Restriction for FOTO Shoulder Survey    Therapist reviewed FOTO scores for Naldo Barakat on 9/9/2020.   FOTO documents entered into [employer] - see Media section.    Current Limitation Score: 41%  Predicted Limitation Score: 28%  Category: Mobility         TREATMENT     Treatment Time In: 7:00 PM  Treatment Time Out: 7:15 PM  Total Treatment time separate from Evaluation: 15 minutes    Naldo received therapeutic exercises to develop strength and ROM for 5 minutes including:  Supine Shoulder Flexion with unweighted dowel: 1x10, pain free range  Supine Shoulder ABD with unweighted dowel: 1x10, pain  free range  Supine Shoulder ER with unweighted dowel: 1x10, pain free range    Naldo received the following manual therapy techniques: Joint mobilizations and PROM were applied to the: left shoulder for 10 minutes, including:  PROM Shoulder Flexion: 1x10  PROM Shoulder ABD: 1x10, ~90 degrees  PROM Shoulder ER   PROM Shoulder IR  GH Anterior, Posterior and Inferior mobilizations  GH Distraction      Home Exercises and Patient Education Provided    Education provided:   - Importance of role of physical therapy  - Proper body mechanics when performing HEP    Written Home Exercises Provided: yes.  Exercises were reviewed and Naldo was able to demonstrate them prior to the end of the session.  Naldo demonstrated good  understanding of the education provided.     See EMR under Patient Instructions for exercises provided 9/9/2020.    Assessment     Naldo is a 55 y.o. male referred to outpatient Physical Therapy with a medical diagnosis of adhesive capsulitis of left shoulder. Pt presents to PT with pain, decreased left shoulder ROM, decreased strength and flexibility of left upper extremity, poor posture, and functional deficits with driving, reaching above head, and dressing. Patient would benefit from participating in skilled physical therapy to address deficits listed above and to begin a thorough stretching and strengthening program.       Pt prognosis is Excellent.   Pt will benefit from skilled outpatient Physical Therapy to address the deficits stated above and in the chart below, provide pt/family education, and to maximize pt's level of independence.     Plan of care discussed with patient: Yes  Pt's spiritual, cultural and educational needs considered and pt agreeable to plan of care and goals as stated below:     Anticipated Barriers for therapy: None      Medical Necessity is demonstrated by the following  History  Co-morbidities and personal factors that may impact the plan of care Co-morbidities:   Left inguinal  hernia, obstructive sleep apnea    Personal Factors:   no deficits     low   Examination  Body Structures and Functions, activity limitations and participation restrictions that may impact the plan of care Body Regions:   neck  upper extremities    Body Systems:    gross symmetry  ROM  strength  gross coordinated movement    Participation Restrictions:   Co-morbidities    Activity limitations:   Learning and applying knowledge  no deficits    General Tasks and Commands  no deficits    Communication  no deficits    Mobility  lifting and carrying objects    Self care  dressing    Domestic Life  doing house work (cleaning house, washing dishes, laundry)    Interactions/Relationships  no deficits    Life Areas  no deficits    Community and Social Life  community life  recreation and leisure         low   Clinical Presentation evolving clinical presentation with changing clinical characteristics low   Decision Making/ Complexity Score: low         Goals:    Short Term Goals (3 Weeks):   1. Pt will be compliant with HEP to assist PT treatment in restoring pain free motion of the R shoulder.   2. Pt will improve impaired shoulder MMTs 1/2 grade B to improve strength for functional tasks.  3. Pt will improve L shoulder flexion to >/= 10 deg to improve functional mobility of UEs   4. Pt will improve L shoulder abduction to >/= 20 deg to improve functional mobility of UEs     Long Term Goals (6 Weeks):   1. Pt will improve FOTO score to </= 28% to demonstrate improvements in carrying, moving, and handling objects  2. Pt will improve impaired shoulder MMTs 1 grade B to improve strength for household duties.  3. Pt will improve L shoulder flexion to >/= 160 deg to improve functional mobility of UEs   4. Pt will improve L shoulder abduction to >/= 160 deg to improve functional mobility of UEs  5. Pt will report decreased pain at worst to less than or equal to 3/10 in order to improve ability to perform everyday tasks.     Plan      Plan of care Certification: 9/9/2020 to 10/23/2020.    Outpatient Physical Therapy 2 times weekly for 6 weeks to include the following interventions: Electrical Stimulation , FDN prn, Manual Therapy, Moist Heat/ Ice, Neuromuscular Re-ed, Patient Education, Therapeutic Activites, Therapeutic Exercise, Ultrasound and Kinesiotape prn.     Delfina Brenner, PT, DPT

## 2020-09-16 ENCOUNTER — CLINICAL SUPPORT (OUTPATIENT)
Dept: REHABILITATION | Facility: HOSPITAL | Age: 55
End: 2020-09-16
Attending: PHYSICIAN ASSISTANT
Payer: COMMERCIAL

## 2020-09-16 DIAGNOSIS — G89.29 CHRONIC LEFT SHOULDER PAIN: ICD-10-CM

## 2020-09-16 DIAGNOSIS — M25.612 DECREASED RANGE OF MOTION OF LEFT SHOULDER: ICD-10-CM

## 2020-09-16 DIAGNOSIS — M25.512 CHRONIC LEFT SHOULDER PAIN: ICD-10-CM

## 2020-09-16 PROCEDURE — 97140 MANUAL THERAPY 1/> REGIONS: CPT | Mod: PN,CQ

## 2020-09-16 PROCEDURE — 97110 THERAPEUTIC EXERCISES: CPT | Mod: PN,CQ

## 2020-09-16 NOTE — PROGRESS NOTES
"  Physical Therapy Treatment Note     Name: Naldo Barakat  Clinic Number: [id number]    Therapy Diagnosis:   Encounter Diagnoses   Name Primary?    Chronic left shoulder pain     Decreased range of motion of left shoulder      Physician: Mikhail Cooper PA-C    Visit Date: 9/16/2020    Physician Orders: PT Eval and Treat: PT for left shoulder, adhesive capsulitis  Medical Diagnosis from Referral:   M25.512,G89.29 (ICD-10-CM) - Chronic left shoulder pain   M75.02 (ICD-10-CM) - Adhesive capsulitis of left shoulder      Evaluation Date: 9/9/2020  Authorization Period Expiration: 12/31/2020  Plan of Care Expiration: 9/9/2020 to 10/23/2020  Visit # / Visits authorized: 2/60     Time In: 7:00 AM   Time Out: 7:45 AM   Total Billable Time: 45 minutes (2MT, 1 TE)    Precautions: Standard    Subjective     Pt reports: "Im not in that much pain since my injection in my shoulder". Pt agreeable to PT session.  He was compliant with home exercise program.  Response to previous treatment: evaluation previous session   Functional change: no changes stated     Pain: 1/10  Location: left shoulder      Objective     Naldo received therapeutic exercises to develop strength and ROM for 35 minutes including:    -Supine L Shoulder Flexion with unweighted dowel: 2x10, pain free range  -Supine L Shoulder ABD with unweighted dowel: 2x10, pain free range  -Supine L Shoulder ER with unweighted dowel: 1x10, pain free range  -Seated L shoulder retractions 2x10  -sidelying L shoulder abduction x15 (pain free)  -sidelying L shoulder ER x 15 (pain free)  -Wall slides 2x10  -pulleys shoulder abduction/ flexion 2 / 2 min  -doorway pectoral stretch 20 sec x 3       Naldo received the following manual therapy techniques: Joint mobilizations and PROM were applied to the: left shoulder for 10 minutes, including:  -PROM Shoulder Flexion,  ABD: 1x10, ~90 degrees, IR/ER (end range overpressure)  -pectoral lifts  -lateral shoulder telescoping  -GH Anterior, " "Posterior and Inferior mobilizations  -GH Distraction with oscillation       Home Exercises Provided and Patient Education Provided     Education provided:   - additional HEP   - ice as needed    Written Home Exercises Provided: Patient instructed to cont prior HEP.  Exercises were reviewed and Naldo was able to demonstrate them prior to the end of the session.  Naldo demonstrated good  understanding of the education provided.     See EMR under Patient Instructions for exercises provided prior visit.    Assessment     Pt tolerated session with reports his left shoulder "loosened" at end of session. Pt completed all therex within pain parameters, no increased pain noted.  Difficulty performing doorway pectoral stretch which was modified positioning for completion.   Naldo is progressing well towards his goals.   Pt prognosis is Good.     Pt will continue to benefit from skilled outpatient physical therapy to address the deficits listed in the problem list box on initial evaluation, provide pt/family education and to maximize pt's level of independence in the home and community environment.     Pt's spiritual, cultural and educational needs considered and pt agreeable to plan of care and goals.     Anticipated barriers to physical therapy: see precautions    Goals:     Short Term Goals (3 Weeks):   1. Pt will be compliant with HEP to assist PT treatment in restoring pain free motion of the R shoulder. (ongoing, not met)  2. Pt will improve impaired shoulder MMTs 1/2 grade B to improve strength for functional tasks.(ongoing, not met)  3. Pt will improve L shoulder flexion to >/= 10 deg to improve functional mobility of UEs (ongoing, not met)  4. Pt will improve L shoulder abduction to >/= 20 deg to improve functional mobility of UEs(ongoing, not met)     Long Term Goals (6 Weeks):   1. Pt will improve FOTO score to </= 28% to demonstrate improvements in carrying, moving, and handling objects (ongoing, not met)  2. Pt will " improve impaired shoulder MMTs 1 grade B to improve strength for household duties.  (ongoing, not met)  3. Pt will improve L shoulder flexion to >/= 160 deg to improve functional mobility of UEs  (ongoing, not met)  4. Pt will improve L shoulder abduction to >/= 160 deg to improve functional mobility of UEs (ongoing, not met)  5. Pt will report decreased pain at worst to less than or equal to 3/10 in order to improve ability to perform everyday tasks.  (ongoing, not met)       Plan   Cont to progress PT as per BLADIMIR Camargo, ANGELIA

## 2020-09-23 ENCOUNTER — PATIENT MESSAGE (OUTPATIENT)
Dept: INTERNAL MEDICINE | Facility: CLINIC | Age: 55
End: 2020-09-23

## 2020-09-23 DIAGNOSIS — G47.33 OSA (OBSTRUCTIVE SLEEP APNEA): ICD-10-CM

## 2020-09-23 DIAGNOSIS — Z12.5 PROSTATE CANCER SCREENING: ICD-10-CM

## 2020-09-23 DIAGNOSIS — Z11.59 ENCOUNTER FOR HEPATITIS C SCREENING TEST FOR LOW RISK PATIENT: ICD-10-CM

## 2020-09-23 DIAGNOSIS — Z00.00 ANNUAL PHYSICAL EXAM: Primary | ICD-10-CM

## 2020-09-24 ENCOUNTER — PATIENT MESSAGE (OUTPATIENT)
Dept: INTERNAL MEDICINE | Facility: CLINIC | Age: 55
End: 2020-09-24

## 2020-09-25 ENCOUNTER — LAB VISIT (OUTPATIENT)
Dept: LAB | Facility: HOSPITAL | Age: 55
End: 2020-09-25
Attending: INTERNAL MEDICINE
Payer: COMMERCIAL

## 2020-09-25 DIAGNOSIS — Z00.00 ANNUAL PHYSICAL EXAM: ICD-10-CM

## 2020-09-25 DIAGNOSIS — G47.33 OSA (OBSTRUCTIVE SLEEP APNEA): ICD-10-CM

## 2020-09-25 DIAGNOSIS — Z11.59 ENCOUNTER FOR HEPATITIS C SCREENING TEST FOR LOW RISK PATIENT: ICD-10-CM

## 2020-09-25 DIAGNOSIS — Z12.5 PROSTATE CANCER SCREENING: ICD-10-CM

## 2020-09-25 LAB
ALBUMIN SERPL BCP-MCNC: 4 G/DL (ref 3.5–5.2)
ALP SERPL-CCNC: 85 U/L (ref 55–135)
ALT SERPL W/O P-5'-P-CCNC: 29 U/L (ref 10–44)
ANION GAP SERPL CALC-SCNC: 8 MMOL/L (ref 8–16)
AST SERPL-CCNC: 26 U/L (ref 10–40)
BASOPHILS # BLD AUTO: 0.04 K/UL (ref 0–0.2)
BASOPHILS NFR BLD: 0.6 % (ref 0–1.9)
BILIRUB SERPL-MCNC: 0.7 MG/DL (ref 0.1–1)
BUN SERPL-MCNC: 18 MG/DL (ref 6–20)
CALCIUM SERPL-MCNC: 9.1 MG/DL (ref 8.7–10.5)
CHLORIDE SERPL-SCNC: 106 MMOL/L (ref 95–110)
CHOLEST SERPL-MCNC: 146 MG/DL (ref 120–199)
CHOLEST/HDLC SERPL: 3.5 {RATIO} (ref 2–5)
CO2 SERPL-SCNC: 27 MMOL/L (ref 23–29)
COMPLEXED PSA SERPL-MCNC: 0.64 NG/ML (ref 0–4)
CREAT SERPL-MCNC: 1.3 MG/DL (ref 0.5–1.4)
DIFFERENTIAL METHOD: ABNORMAL
EOSINOPHIL # BLD AUTO: 0.1 K/UL (ref 0–0.5)
EOSINOPHIL NFR BLD: 1.2 % (ref 0–8)
ERYTHROCYTE [DISTWIDTH] IN BLOOD BY AUTOMATED COUNT: 12.2 % (ref 11.5–14.5)
EST. GFR  (AFRICAN AMERICAN): >60 ML/MIN/1.73 M^2
EST. GFR  (NON AFRICAN AMERICAN): >60 ML/MIN/1.73 M^2
GLUCOSE SERPL-MCNC: 95 MG/DL (ref 70–110)
HCT VFR BLD AUTO: 46.1 % (ref 40–54)
HCV AB SERPL QL IA: NEGATIVE
HDLC SERPL-MCNC: 42 MG/DL (ref 40–75)
HDLC SERPL: 28.8 % (ref 20–50)
HGB BLD-MCNC: 15.1 G/DL (ref 14–18)
IMM GRANULOCYTES # BLD AUTO: 0.01 K/UL (ref 0–0.04)
IMM GRANULOCYTES NFR BLD AUTO: 0.2 % (ref 0–0.5)
LDLC SERPL CALC-MCNC: 92.4 MG/DL (ref 63–159)
LYMPHOCYTES # BLD AUTO: 1.5 K/UL (ref 1–4.8)
LYMPHOCYTES NFR BLD: 22.2 % (ref 18–48)
MCH RBC QN AUTO: 32.7 PG (ref 27–31)
MCHC RBC AUTO-ENTMCNC: 32.8 G/DL (ref 32–36)
MCV RBC AUTO: 100 FL (ref 82–98)
MONOCYTES # BLD AUTO: 0.5 K/UL (ref 0.3–1)
MONOCYTES NFR BLD: 7.8 % (ref 4–15)
NEUTROPHILS # BLD AUTO: 4.5 K/UL (ref 1.8–7.7)
NEUTROPHILS NFR BLD: 68 % (ref 38–73)
NONHDLC SERPL-MCNC: 104 MG/DL
NRBC BLD-RTO: 0 /100 WBC
PLATELET # BLD AUTO: 259 K/UL (ref 150–350)
PMV BLD AUTO: 9.2 FL (ref 9.2–12.9)
POTASSIUM SERPL-SCNC: 4.2 MMOL/L (ref 3.5–5.1)
PROT SERPL-MCNC: 7 G/DL (ref 6–8.4)
RBC # BLD AUTO: 4.62 M/UL (ref 4.6–6.2)
SODIUM SERPL-SCNC: 141 MMOL/L (ref 136–145)
TRIGL SERPL-MCNC: 58 MG/DL (ref 30–150)
TSH SERPL DL<=0.005 MIU/L-ACNC: 1.91 UIU/ML (ref 0.4–4)
WBC # BLD AUTO: 6.57 K/UL (ref 3.9–12.7)

## 2020-09-25 PROCEDURE — 85025 COMPLETE CBC W/AUTO DIFF WBC: CPT

## 2020-09-25 PROCEDURE — 80061 LIPID PANEL: CPT

## 2020-09-25 PROCEDURE — 84443 ASSAY THYROID STIM HORMONE: CPT

## 2020-09-25 PROCEDURE — 84153 ASSAY OF PSA TOTAL: CPT

## 2020-09-25 PROCEDURE — 86803 HEPATITIS C AB TEST: CPT

## 2020-09-25 PROCEDURE — 36415 COLL VENOUS BLD VENIPUNCTURE: CPT

## 2020-09-25 PROCEDURE — 80053 COMPREHEN METABOLIC PANEL: CPT

## 2020-09-28 ENCOUNTER — CLINICAL SUPPORT (OUTPATIENT)
Dept: REHABILITATION | Facility: HOSPITAL | Age: 55
End: 2020-09-28
Attending: PHYSICIAN ASSISTANT
Payer: COMMERCIAL

## 2020-09-28 DIAGNOSIS — G89.29 CHRONIC LEFT SHOULDER PAIN: ICD-10-CM

## 2020-09-28 DIAGNOSIS — M25.612 DECREASED RANGE OF MOTION OF LEFT SHOULDER: ICD-10-CM

## 2020-09-28 DIAGNOSIS — M25.512 CHRONIC LEFT SHOULDER PAIN: ICD-10-CM

## 2020-09-28 PROCEDURE — 97140 MANUAL THERAPY 1/> REGIONS: CPT | Mod: PN

## 2020-09-28 PROCEDURE — 97110 THERAPEUTIC EXERCISES: CPT | Mod: PN

## 2020-09-28 NOTE — PROGRESS NOTES
"  Physical Therapy Treatment Note     Name: Naldo Barakat  Clinic Number: [id number]    Therapy Diagnosis:   Encounter Diagnoses   Name Primary?    Chronic left shoulder pain     Decreased range of motion of left shoulder      Physician: Mikhail Cooper PA-C    Visit Date: 9/28/2020     Physician Orders: PT Eval and Treat: PT for left shoulder, adhesive capsulitis  Medical Diagnosis from Referral:   M25.512,G89.29 (ICD-10-CM) - Chronic left shoulder pain   M75.02 (ICD-10-CM) - Adhesive capsulitis of left shoulder     Evaluation Date: 9/9/2020  Authorization Period Expiration: 12/31/2020  Plan of Care Expiration: 9/9/2020 to 10/23/2020  Visit # / Visits authorized: 3/60     Time In: 6:33 PM   Time Out: 7:19 PM  Total Billable Time: 46 minutes (2 TE, 1 MT)    Precautions: Standard    Subjective     Pt reports: he's seen a significant improvement in his range of motion since he's started performing his exercises. Has returned to working out and was able to do mountain climbers with putting most of his weight through both shoulders.   He was compliant with home exercise program.  Response to previous treatment: evaluation previous session   Functional change: no changes stated     Pain: 0/10  Location: left shoulder      Objective     Naldo received therapeutic exercises to develop strength and ROM for 36 minutes including:    -Supine L Shoulder Flexion: 3x10, 1# dowel, pain free range  -Supine L Shoulder ABD: 3x10, 1# dowel, pain free range  -Supine L Shoulder ER: 3x10, 1# dowel, pain free range  -Sidelying L Shoulder ABD: 2x10, pain free range  -Sidelying L Shoulder ER: 2x10, pain free range  -Prone Shoulder Extension: 2x10, 2# dumbbell  -Prone Shoulder Rowing: 2x10, 2# dumbbell  -Prone Shoulder Scaption: 2x10, pain free range    -Pulleys: 2 minutes flexion, 2 minutes abduction  -Shoulder retractions: 2x10, 3" holds, BlueTB  -Wall slides 2x10 - OOT  -Doorway pectoral stretch 20 sec x 3 - OOT      Naldo received the " following manual therapy techniques: Joint mobilizations and PROM were applied to the: left shoulder for 10 minutes, including:  -PROM Shoulder Flexion,  ABD, IR/ER   -lateral shoulder telescoping - NOT TODAY  -GH Anterior, Posterior and Inferior mobilizations  -GH Distraction with oscillation       Home Exercises Provided and Patient Education Provided     Education provided:   - additional HEP   - ice as needed    Written Home Exercises Provided: Patient instructed to cont prior HEP.  Exercises were reviewed and Naldo was able to demonstrate them prior to the end of the session.  Naldo demonstrated good  understanding of the education provided.     See EMR under Patient Instructions for exercises provided prior visit.    Assessment     Patient presented to therapy with no pain and reports of improved range of motion. Continues to have some pain and restriction into shoulder abduction and ER. Added resisted prone exercises today with good performance of shoulder extension and rowing, some difficulty with scaption. Issued blue and black theraband for performance of retractions and lat pull downs at home.   Naldo is progressing well towards his goals.   Pt prognosis is Good.     Pt will continue to benefit from skilled outpatient physical therapy to address the deficits listed in the problem list box on initial evaluation, provide pt/family education and to maximize pt's level of independence in the home and community environment.     Pt's spiritual, cultural and educational needs considered and pt agreeable to plan of care and goals.     Anticipated barriers to physical therapy: see precautions    Goals:     Short Term Goals (3 Weeks):   1. Pt will be compliant with HEP to assist PT treatment in restoring pain free motion of the R shoulder. (ongoing, not met)  2. Pt will improve impaired shoulder MMTs 1/2 grade B to improve strength for functional tasks. (ongoing, not met)  3. Pt will improve L shoulder flexion to >/=  10 deg to improve functional mobility of UEs. (ongoing, not met)  4. Pt will improve L shoulder abduction to >/= 20 deg to improve functional mobility of UEs. (ongoing, not met)     Long Term Goals (6 Weeks):   1. Pt will improve FOTO score to </= 28% to demonstrate improvements in carrying, moving, and handling objects (ongoing, not met)  2. Pt will improve impaired shoulder MMTs 1 grade B to improve strength for household duties. (ongoing, not met)  3. Pt will improve L shoulder flexion to >/= 160 deg to improve functional mobility of UEs. (ongoing, not met)  4. Pt will improve L shoulder abduction to >/= 160 deg to improve functional mobility of UEs. (ongoing, not met)  5. Pt will report decreased pain at worst to less than or equal to 3/10 in order to improve ability to perform everyday tasks. (ongoing, not met)       Plan   Continue POC, advancing towards functional goals.     Delfina Brenner, PT

## 2020-09-30 ENCOUNTER — CLINICAL SUPPORT (OUTPATIENT)
Dept: REHABILITATION | Facility: HOSPITAL | Age: 55
End: 2020-09-30
Attending: PHYSICIAN ASSISTANT
Payer: COMMERCIAL

## 2020-09-30 ENCOUNTER — OFFICE VISIT (OUTPATIENT)
Dept: INTERNAL MEDICINE | Facility: CLINIC | Age: 55
End: 2020-09-30
Payer: COMMERCIAL

## 2020-09-30 VITALS
SYSTOLIC BLOOD PRESSURE: 126 MMHG | HEART RATE: 70 BPM | DIASTOLIC BLOOD PRESSURE: 82 MMHG | OXYGEN SATURATION: 97 % | HEIGHT: 71 IN | WEIGHT: 257.94 LBS | BODY MASS INDEX: 36.11 KG/M2

## 2020-09-30 DIAGNOSIS — G89.29 CHRONIC LEFT SHOULDER PAIN: ICD-10-CM

## 2020-09-30 DIAGNOSIS — G47.33 OSA (OBSTRUCTIVE SLEEP APNEA): ICD-10-CM

## 2020-09-30 DIAGNOSIS — Z00.00 ANNUAL PHYSICAL EXAM: Primary | ICD-10-CM

## 2020-09-30 DIAGNOSIS — J30.9 ALLERGIC RHINITIS, UNSPECIFIED SEASONALITY, UNSPECIFIED TRIGGER: ICD-10-CM

## 2020-09-30 DIAGNOSIS — M75.02 ADHESIVE CAPSULITIS OF LEFT SHOULDER: ICD-10-CM

## 2020-09-30 DIAGNOSIS — M25.512 CHRONIC LEFT SHOULDER PAIN: ICD-10-CM

## 2020-09-30 DIAGNOSIS — M25.612 DECREASED RANGE OF MOTION OF LEFT SHOULDER: ICD-10-CM

## 2020-09-30 PROCEDURE — 3008F PR BODY MASS INDEX (BMI) DOCUMENTED: ICD-10-PCS | Mod: CPTII,S$GLB,, | Performed by: INTERNAL MEDICINE

## 2020-09-30 PROCEDURE — 3008F BODY MASS INDEX DOCD: CPT | Mod: CPTII,S$GLB,, | Performed by: INTERNAL MEDICINE

## 2020-09-30 PROCEDURE — 99999 PR PBB SHADOW E&M-EST. PATIENT-LVL III: CPT | Mod: PBBFAC,,, | Performed by: INTERNAL MEDICINE

## 2020-09-30 PROCEDURE — 97140 MANUAL THERAPY 1/> REGIONS: CPT | Mod: PN,CQ

## 2020-09-30 PROCEDURE — 97110 THERAPEUTIC EXERCISES: CPT | Mod: PN,CQ

## 2020-09-30 PROCEDURE — 99396 PR PREVENTIVE VISIT,EST,40-64: ICD-10-PCS | Mod: S$GLB,,, | Performed by: INTERNAL MEDICINE

## 2020-09-30 PROCEDURE — 99999 PR PBB SHADOW E&M-EST. PATIENT-LVL III: ICD-10-PCS | Mod: PBBFAC,,, | Performed by: INTERNAL MEDICINE

## 2020-09-30 PROCEDURE — 99396 PREV VISIT EST AGE 40-64: CPT | Mod: S$GLB,,, | Performed by: INTERNAL MEDICINE

## 2020-09-30 NOTE — PROGRESS NOTES
MEDICAL HISTORY:  Obstructive sleep apnea with the use of CPAP  Allergic rhinitis.  Left shoulder  adhesivecapsulitis     SOCIAL HISTORY:  Tobacco use, none.  Alcohol use, very limited.  , has   three children.  Exercise, walking routine   FAMILY HISTORY:  Mother is , had lung cancer and breast cancer.  Father   is , liver cancer.  Two sisters and one brother, no health conditions.  1 sister with bronchiectasis     MEDICATIONS:   Mobic 15 mg once a day as needed.     SCREENING:  Colonoscopy 02/15/2016 was normal.    Component      Latest Ref Rng & Units 2020   WBC      3.90 - 12.70 K/uL 6.57   RBC      4.60 - 6.20 M/uL 4.62   Hemoglobin      14.0 - 18.0 g/dL 15.1   Hematocrit      40.0 - 54.0 % 46.1   MCV      82 - 98 fL 100 (H)   MCH      27.0 - 31.0 pg 32.7 (H)   MCHC      32.0 - 36.0 g/dL 32.8   RDW      11.5 - 14.5 % 12.2   Platelets      150 - 350 K/uL 259   MPV      9.2 - 12.9 fL 9.2   Immature Granulocytes      0.0 - 0.5 % 0.2   Gran # (ANC)      1.8 - 7.7 K/uL 4.5   Immature Grans (Abs)      0.00 - 0.04 K/uL 0.01   Lymph #      1.0 - 4.8 K/uL 1.5   Mono #      0.3 - 1.0 K/uL 0.5   Eos #      0.0 - 0.5 K/uL 0.1   Baso #      0.00 - 0.20 K/uL 0.04   nRBC      0 /100 WBC 0   Gran%      38.0 - 73.0 % 68.0   Lymph%      18.0 - 48.0 % 22.2   Mono%      4.0 - 15.0 % 7.8   Eosinophil%      0.0 - 8.0 % 1.2   Basophil%      0.0 - 1.9 % 0.6   Differential Method       Automated   Sodium      136 - 145 mmol/L 141   Potassium      3.5 - 5.1 mmol/L 4.2   Chloride      95 - 110 mmol/L 106   CO2      23 - 29 mmol/L 27   Glucose      70 - 110 mg/dL 95   BUN, Bld      6 - 20 mg/dL 18   Creatinine      0.5 - 1.4 mg/dL 1.3   Calcium      8.7 - 10.5 mg/dL 9.1   PROTEIN TOTAL      6.0 - 8.4 g/dL 7.0   Albumin      3.5 - 5.2 g/dL 4.0   BILIRUBIN TOTAL      0.1 - 1.0 mg/dL 0.7   Alkaline Phosphatase      55 - 135 U/L 85   AST      10 - 40 U/L 26   ALT      10 - 44 U/L 29   Anion Gap      8 - 16 mmol/L 8    eGFR if African American      >60 mL/min/1.73 m:2 >60.0   eGFR if non African American      >60 mL/min/1.73 m:2 >60.0   Cholesterol      120 - 199 mg/dL 146   Triglycerides      30 - 150 mg/dL 58   HDL      40 - 75 mg/dL 42   LDL Cholesterol External      63.0 - 159.0 mg/dL 92.4   Hdl/Cholesterol Ratio      20.0 - 50.0 % 28.8   Total Cholesterol/HDL Ratio      2.0 - 5.0 3.5   Non-HDL Cholesterol      mg/dL 104   TSH      0.400 - 4.000 uIU/mL 1.910   PSA, SCREEN      0.00 - 4.00 ng/mL 0.64   Hepatitis C Ab      Negative Negative         55-year-old male  Annual visit  Overall in general has been feeling well    He is involved in physical therapy for left shoulder he has of capsulitis/frozen shoulder and has responded well in which has less pain and improved range of motion    Last year, been on CPAP for obstructive sleep apnea and also has responded well in which he has better quality sleep, no snoring, no apnea spells and does not feel tired or fatigued as the day goes on    Review of symptoms  Reports no chest pain palpitations shortness of breath or abdominal pain  Regular bowel function  No difficulty urinating, nocturia x2  No other arthralgias except the left shoulder  No indigestion heartburn headaches      Examination  Weight 257  Pulse 72  Blood pressure 126/86  HEENT exam no abnormal findings  Neck no thyromegaly no masses  Chest clear breath sounds  Heart regular rate rhythm  Abdominal exam nontender soft no hepatosplenomegaly abdominal masses  Pulses 2+ carotid pulses no bruits 2+ pedal pulses  Extremities no edema  Lymph gland palpable adenopathy  Skin of was abnormal findings  Genitalia scrotal masses no hernias  Rectal stool is brown heme-negative, prostate minimally enlarged at most    Impression  General examination  Obstructive sleep apnea  Left shoulder he has a capsulitis  Allergic rhinitis    Plan  Continue with attention a proper physical activity and dietary habits  Continue with physical  therapy  Maintain yearly exams

## 2020-09-30 NOTE — PROGRESS NOTES
"  Physical Therapy Treatment Note     Name: Naldo Barakat  Clinic Number: [id number]    Therapy Diagnosis:   Encounter Diagnoses   Name Primary?    Chronic left shoulder pain     Decreased range of motion of left shoulder      Physician: Mikhail Cooper PA-C    Visit Date: 9/30/2020     Physician Orders: PT Eval and Treat: PT for left shoulder, adhesive capsulitis  Medical Diagnosis from Referral:   M25.512,G89.29 (ICD-10-CM) - Chronic left shoulder pain   M75.02 (ICD-10-CM) - Adhesive capsulitis of left shoulder     Evaluation Date: 9/9/2020  Authorization Period Expiration: 12/31/2020  Plan of Care Expiration: 9/9/2020 to 10/23/2020  Visit # / Visits authorized: 3/60     Time In: 6:33 PM   Time Out: 7:18 PM  Total Billable Time: 45 minutes (2 TE, 1 MT)    Precautions: Standard    Subjective     Pt reports: "I had some good days". Pt ageeable to PT session   He was compliant with home exercise program.  Response to previous treatment: none stated by pt  Functional change: no changes stated     Pain: 0/10  Location: left shoulder      Objective     Naldo received therapeutic exercises to develop strength and ROM for 36 minutes including:    -Supine L Shoulder Flexion: 3x10, 1# dowel, pain free range  -Supine L Shoulder ABD: 3x10, 1# dowel, pain free range  -Supine L Shoulder ER: 3x10, 1# dowel, pain free range  -Sidelying L Shoulder ABD: 2x10, pain free range  -Sidelying L Shoulder ER: 2x10, pain free range  -Prone Shoulder Extension: 2x10, 2# dumbbell  -Prone Shoulder Rowing: 2x10, 2# dumbbell  -Prone Shoulder Scaption: 2x10, pain free range    -Pulleys: 2 minutes flexion, 2 minutes abduction  -Shoulder retractions: 2x10, 3" holds, BlueTB  -Wall slides 2x10 - OOT  -Doorway pectoral stretch 20 sec x 3 - OOT  -lower trap lift off in child pose position x10  -child pose L flank stretch 30 sec x 3     Naldo received the following manual therapy techniques: Joint mobilizations and PROM were applied to the: left shoulder " for 10 minutes, including:  -PROM Shoulder Flexion,  ABD, IR/ER   -lateral shoulder telescoping - NOT TODAY  -GH Anterior, Posterior and Inferior mobilizations  -GH Distraction with oscillation       Home Exercises Provided and Patient Education Provided     Education provided:   - additional HEP   - ice as needed    Written Home Exercises Provided: Patient instructed to cont prior HEP.  Exercises were reviewed and Naldo was able to demonstrate them prior to the end of the session.  Naldo demonstrated good  understanding of the education provided.     See EMR under Patient Instructions for exercises provided prior visit.    Assessment     Pt completed all therx with no reports of increased left shoulder pain. He demonstrated limited Left ER at 90* shoulder abduction. Overall pt is cooperative and able to follow verbal instructions with no difficulty.   Naldo is progressing well towards his goals.   Pt prognosis is Good.     Pt will continue to benefit from skilled outpatient physical therapy to address the deficits listed in the problem list box on initial evaluation, provide pt/family education and to maximize pt's level of independence in the home and community environment.     Pt's spiritual, cultural and educational needs considered and pt agreeable to plan of care and goals.     Anticipated barriers to physical therapy: see precautions    Goals:     Short Term Goals (3 Weeks):   1. Pt will be compliant with HEP to assist PT treatment in restoring pain free motion of the R shoulder. (ongoing, not met)  2. Pt will improve impaired shoulder MMTs 1/2 grade B to improve strength for functional tasks. (ongoing, not met)  3. Pt will improve L shoulder flexion to >/= 10 deg to improve functional mobility of UEs. (ongoing, not met)  4. Pt will improve L shoulder abduction to >/= 20 deg to improve functional mobility of UEs. (ongoing, not met)     Long Term Goals (6 Weeks):   1. Pt will improve FOTO score to </= 28% to  demonstrate improvements in carrying, moving, and handling objects (ongoing, not met)  2. Pt will improve impaired shoulder MMTs 1 grade B to improve strength for household duties. (ongoing, not met)  3. Pt will improve L shoulder flexion to >/= 160 deg to improve functional mobility of UEs. (ongoing, not met)  4. Pt will improve L shoulder abduction to >/= 160 deg to improve functional mobility of UEs. (ongoing, not met)  5. Pt will report decreased pain at worst to less than or equal to 3/10 in order to improve ability to perform everyday tasks. (ongoing, not met)       Plan   Continue PT as per  POC, advancing towards functional goals.     Jose Camargo, PTA

## 2020-10-05 ENCOUNTER — CLINICAL SUPPORT (OUTPATIENT)
Dept: REHABILITATION | Facility: HOSPITAL | Age: 55
End: 2020-10-05
Attending: PHYSICIAN ASSISTANT
Payer: COMMERCIAL

## 2020-10-05 DIAGNOSIS — G89.29 CHRONIC LEFT SHOULDER PAIN: ICD-10-CM

## 2020-10-05 DIAGNOSIS — M25.512 CHRONIC LEFT SHOULDER PAIN: ICD-10-CM

## 2020-10-05 DIAGNOSIS — M25.612 DECREASED RANGE OF MOTION OF LEFT SHOULDER: ICD-10-CM

## 2020-10-05 PROCEDURE — 97110 THERAPEUTIC EXERCISES: CPT | Mod: PN,CQ

## 2020-10-05 PROCEDURE — 97140 MANUAL THERAPY 1/> REGIONS: CPT | Mod: PN,CQ

## 2020-10-05 NOTE — PROGRESS NOTES
"  Physical Therapy Treatment Note     Name: Naldo Barakat  Clinic Number: [id number]    Therapy Diagnosis:   Encounter Diagnoses   Name Primary?    Chronic left shoulder pain     Decreased range of motion of left shoulder      Physician: Mikhail Cooper PA-C    Visit Date: 10/5/2020     Physician Orders: PT Eval and Treat: PT for left shoulder, adhesive capsulitis  Medical Diagnosis from Referral:   M25.512,G89.29 (ICD-10-CM) - Chronic left shoulder pain   M75.02 (ICD-10-CM) - Adhesive capsulitis of left shoulder     Evaluation Date: 9/9/2020  Authorization Period Expiration: 12/31/2020  Plan of Care Expiration: 9/9/2020 to 10/23/2020  Visit # / Visits authorized: 5/60     Time In: 6:50 PM   Time Out: 7:30 PM  Total Billable Time: 40 minutes (2 TE, 1 MT)    Precautions: Standard    Subjective     Pt reports: "I worked out before coming here, i'm not really in any pain". Pt ageeable to PT session   He was compliant with home exercise program.  Response to previous treatment: none stated by pt  Functional change: no changes stated     Pain: 0/10  Location: left shoulder      Objective     Naldo received therapeutic exercises to develop strength and ROM for 35 minutes including:    -Supine L Shoulder Flexion: 3x10, 1# dowel, pain free range  -Supine L Shoulder ABD: 3x10, 1# dowel, pain free range  -Supine L Shoulder ER: 3x10, 1# dowel, pain free range  -Sidelying L Shoulder ABD: 2x10, pain free range  -Sidelying L Shoulder ER: 2x10, pain free range  -Prone Shoulder Extension: 2x10, 2# dumbbell  -Prone Shoulder Rowing: 2x10, 2# dumbbell  -Prone Shoulder Scaption: 2x10, pain free range    -Pulleys: 2 minutes flexion, 2 minutes abduction  -Shoulder retractions: 2x10, 3" holds, BlueTB  -Wall slides 2x10 - OOT  -Doorway pectoral stretch 20 sec x 3 - OOT  -lower trap lift off in child pose position x10  -child pose L flank stretch 30 sec x 3     Naldo received the following manual therapy techniques: Joint mobilizations and " PROM were applied to the: left shoulder for 10 minutes, including:  -PROM Shoulder Flexion,  ABD, IR/ER   -lateral shoulder telescoping   -GH Anterior, Posterior and Inferior mobilizations  -GH Distraction with oscillation       Home Exercises Provided and Patient Education Provided     Education provided:   - additional HEP   - ice as needed    Written Home Exercises Provided: Patient instructed to cont prior HEP.  Exercises were reviewed and Naldo was able to demonstrate them prior to the end of the session.  Naldo demonstrated good  understanding of the education provided.     See EMR under Patient Instructions for exercises provided prior visit.    Assessment     Pt completed all therx with no reports of increased left shoulder pain.  Able to perform all shoulder activities within pain parameters    He Naldo is progressing well towards his goals.   Pt prognosis is Good.     Pt will continue to benefit from skilled outpatient physical therapy to address the deficits listed in the problem list box on initial evaluation, provide pt/family education and to maximize pt's level of independence in the home and community environment.     Pt's spiritual, cultural and educational needs considered and pt agreeable to plan of care and goals.     Anticipated barriers to physical therapy: see precautions    Goals:     Short Term Goals (3 Weeks):   1. Pt will be compliant with HEP to assist PT treatment in restoring pain free motion of the R shoulder. (ongoing, not met)  2. Pt will improve impaired shoulder MMTs 1/2 grade B to improve strength for functional tasks. (ongoing, not met)  3. Pt will improve L shoulder flexion to >/= 10 deg to improve functional mobility of UEs. (ongoing, not met)  4. Pt will improve L shoulder abduction to >/= 20 deg to improve functional mobility of UEs. (ongoing, not met)     Long Term Goals (6 Weeks):   1. Pt will improve FOTO score to </= 28% to demonstrate improvements in carrying, moving, and  handling objects (ongoing, not met)  2. Pt will improve impaired shoulder MMTs 1 grade B to improve strength for household duties. (ongoing, not met)  3. Pt will improve L shoulder flexion to >/= 160 deg to improve functional mobility of UEs. (ongoing, not met)  4. Pt will improve L shoulder abduction to >/= 160 deg to improve functional mobility of UEs. (ongoing, not met)  5. Pt will report decreased pain at worst to less than or equal to 3/10 in order to improve ability to perform everyday tasks. (ongoing, not met)       Plan   Continue PT as per  POC, advancing towards functional goals.     Jose Camargo, PTA

## 2020-10-07 ENCOUNTER — CLINICAL SUPPORT (OUTPATIENT)
Dept: REHABILITATION | Facility: HOSPITAL | Age: 55
End: 2020-10-07
Attending: PHYSICIAN ASSISTANT
Payer: COMMERCIAL

## 2020-10-07 DIAGNOSIS — M25.512 CHRONIC LEFT SHOULDER PAIN: ICD-10-CM

## 2020-10-07 DIAGNOSIS — G89.29 CHRONIC LEFT SHOULDER PAIN: ICD-10-CM

## 2020-10-07 DIAGNOSIS — M25.612 DECREASED RANGE OF MOTION OF LEFT SHOULDER: ICD-10-CM

## 2020-10-07 PROCEDURE — 97110 THERAPEUTIC EXERCISES: CPT | Mod: PN

## 2020-10-07 NOTE — PROGRESS NOTES
"  Physical Therapy Treatment/Discharge Note     Name: Naldo Barakat  Clinic Number: [id number]    Therapy Diagnosis:   Encounter Diagnoses   Name Primary?    Chronic left shoulder pain     Decreased range of motion of left shoulder      Physician: Mikhail Cooper PA-C    Visit Date: 10/7/2020     Physician Orders: PT Eval and Treat: PT for left shoulder, adhesive capsulitis  Medical Diagnosis from Referral:   M25.512,G89.29 (ICD-10-CM) - Chronic left shoulder pain   M75.02 (ICD-10-CM) - Adhesive capsulitis of left shoulder     Evaluation Date: 9/9/2020  Authorization Period Expiration: 12/31/2020  Plan of Care Expiration: 9/9/2020 to 10/23/2020  Visit # / Visits authorized: 6/60     Time In: 6:42 PM - pt arrived late to session  Time Out: 7:17 PM  Total Billable Time: 35 minutes (2 TE)    Precautions: Standard    Subjective     Pt reports: that overall he believes his shoulder is much better than before he started therapy. Would like to be discharged today and will perform all exercises on his own.   He was compliant with home exercise program.  Response to previous treatment: none stated by pt  Functional change: no changes stated     Pain: 0/10  Location: left shoulder      Objective     Naldo received therapeutic exercises to develop strength and ROM for 35  minutes including objective measurements:    -Sidelying L Shoulder ABD: 2x10, pain free range  -Sidelying L Shoulder ER: 2x10, pain free range  -Prone Shoulder Extension: 2x10, 2# dumbbell  -Prone Shoulder Rowing: 2x10, 2# dumbbell  -Prone Shoulder Scaption: 2x10, pain free range    -Shoulder retractions: 2x10, 3" holds, BlackTB  -Doorway Low pectoral stretch 20 sec x 3      Improvements bolded below:    In Standing:  Shoulder Right Left Pain/Dysfunction with Movement   AROM/PROM         Flexion  180/180  140/143 ->  150/160*  Pain near L bicipital belly   Extension  75/85  50/55 ->  60/65     Abduction 180/180  123/125 ->  127/130* Pain near L bicipital " belly    Adduction WFL  WFL     Internal Rotation L3 L4     ER at 90° abd T3 T2        U/E MMT Right Left Pain/Dysfunction with Movement   Shoulder Flexion 5/5 5/5     Shoulder Extension 5/5 5/5     Shoulder Abduction 5/5 5/5     Shoulder Adduction 5/5 5/5     Shoulder IR 5/5 5/5     Shoulder ER  @ 0* Abduction 5/5 5/5     Elbow Flexion  5/5 5/5     Elbow Extension 5/5 5/5     Rhomboids 5/5 4+/5     Mid Traps 5/5 4+/5     Low Traps 5/5 4+/5        Joint Mobility:   - Shoulder: pain with anterior and inferior left GH glides     Special Tests:      Test Right Left Pain/Dysfunction with Movement   Speed's Test (-) (-)     Neer's Impingement Test (-) (-)     Empty Can Test (-) (-)     Drop Arm Test (-) (-)     Alegria-Willie Test (-) (+)              CMS Impairment/Limitation/Restriction for FOTO Shoulder Survey     Therapist reviewed FOTO scores for Naldo Barakat on 10/7/2020.   FOTO documents entered into [employer] - see Media section.     Initial Limitation Score: 41%  Predicted Limitation Score: 28%  Discharge Limitation Score: 33%  Category: Mobility             Home Exercises Provided and Patient Education Provided     Education provided:   - additional HEP   - ice as needed    Written Home Exercises Provided: Patient instructed to cont prior HEP.  Exercises were reviewed and Naldo was able to demonstrate them prior to the end of the session.  Naldo demonstrated good  understanding of the education provided.     See EMR under Patient Instructions for exercises provided prior visit.    Assessment     Naldo progressed well through skilled physical therapy as evident by meeting and making excellent progress towards all goals. Objective measurements taken and patient has shown significant improvements in L shoulder range of motion as bolded above, as well as improvements in L shoulder strength as bolded above. Updated HEP printed with thorough review. Patient formally discharged from skilled physical therapy at this time.     He Naldo is progressing well towards his goals.   Pt prognosis is Good.     Pt will continue to benefit from skilled outpatient physical therapy to address the deficits listed in the problem list box on initial evaluation, provide pt/family education and to maximize pt's level of independence in the home and community environment.     Pt's spiritual, cultural and educational needs considered and pt agreeable to plan of care and goals.     Anticipated barriers to physical therapy: see precautions    Goals:     Short Term Goals (3 Weeks):   1. Pt will be compliant with HEP to assist PT treatment in restoring pain free motion of the R shoulder. MET  2. Pt will improve impaired shoulder MMTs 1/2 grade B to improve strength for functional tasks. MET  3. Pt will improve L shoulder flexion to >/= 10 deg to improve functional mobility of UEs. MET  4. Pt will improve L shoulder abduction to >/= 20 deg to improve functional mobility of UEs. MET     Long Term Goals (6 Weeks):   1. Pt will improve FOTO score to </= 28% to demonstrate improvements in carrying, moving, and handling objects NOT MET, EXCELLENT PROGRESS  2. Pt will improve impaired shoulder MMTs 1 grade B to improve strength for household duties. MET  3. Pt will improve L shoulder flexion to >/= 160 deg to improve functional mobility of UEs. NOT MET  4. Pt will improve L shoulder abduction to >/= 160 deg to improve functional mobility of UEs. NOT MET  5. Pt will report decreased pain at worst to less than or equal to 3/10 in order to improve ability to perform everyday tasks. MET       Plan   Patient formally discharged from skilled physical therapy today.     Delfina Brenner, PT

## 2020-10-08 NOTE — PATIENT INSTRUCTIONS
ROM: Flexion - Wand (Supine)        Lie on back holding wand. Raise arms over head.   Repeat 30 times per set. Do 1 sessions per day.       Supine Shoulder Abduction    Lay on your back and place the wand in your hand with the palm facing up towards the ceiling on the afflicted side and with your palm facing down on the non afflicted side. Slowly raise your arm towards your head while making sure to keep your elbow locked throughout the motion. Repeat 30 times per set.  Do 1 sessions per day.     Supine Shoulder ER    Start on your back with your knees bent. Keeping elbow bent to 90 degrees, palm facing up, and some space between your body and elbow, rotate arm away from body (while maintaining these angles) while using unaffected arm to assist. Note: to increase comfort of shoulder place small towel under upper arm of affected shoulder. Repeat 30 times per set. Do 1 sessions per day.    Sidelying Shoulder Abduction    Lying on your side with your shoulder back, and thumb pointing upwards, raise your arm straight as a board towards the ceiling to ~45 degrees, then return. Repeat 30 times per set. Do 1 sessions per day.                    Prone Shoulder Extension    Lie face down with your arm dangling over the side of the table/bed. Next,  raise your arm back and towards the side of your body. Return to starting position and repeat.Repeat 30 times per set. Do 1 sessions per day.    Prone Rowing    Lying face down with your elbows straight, slowly raise your arms upward while bending your elbows. .Repeat 30 times per set. Do 1 sessions per day.                  Pectoralis Stretch Low    While standing in a doorway, place your arm downward on the door frame and lean in until a stretch is felt along the front of your chest and/or shoulder. Your arm should be pointed downward towards the floor along the door frame.    NOTE: Your legs should control how much you stretch by bending or straightening your knee through the  doorway. Hold for 30 seconds, 3 times. Once daily.     Wall Slides    Patient is standing facing a wall. With the patients injured arm, lay their palm on the wall, moving their hand up and down. Repeat 30 times, 1-2 times a day.

## 2020-10-19 ENCOUNTER — PATIENT OUTREACH (OUTPATIENT)
Dept: ADMINISTRATIVE | Facility: OTHER | Age: 55
End: 2020-10-19

## 2020-10-20 ENCOUNTER — OFFICE VISIT (OUTPATIENT)
Dept: SPORTS MEDICINE | Facility: CLINIC | Age: 55
End: 2020-10-20
Payer: COMMERCIAL

## 2020-10-20 VITALS
SYSTOLIC BLOOD PRESSURE: 127 MMHG | WEIGHT: 265 LBS | DIASTOLIC BLOOD PRESSURE: 87 MMHG | BODY MASS INDEX: 37.1 KG/M2 | HEIGHT: 71 IN | HEART RATE: 54 BPM

## 2020-10-20 DIAGNOSIS — M75.02 ADHESIVE CAPSULITIS OF LEFT SHOULDER: Primary | ICD-10-CM

## 2020-10-20 DIAGNOSIS — G89.29 CHRONIC LEFT SHOULDER PAIN: ICD-10-CM

## 2020-10-20 DIAGNOSIS — M25.512 CHRONIC LEFT SHOULDER PAIN: ICD-10-CM

## 2020-10-20 PROCEDURE — 99999 PR PBB SHADOW E&M-EST. PATIENT-LVL III: CPT | Mod: PBBFAC,,, | Performed by: PHYSICIAN ASSISTANT

## 2020-10-20 PROCEDURE — 99213 PR OFFICE/OUTPT VISIT, EST, LEVL III, 20-29 MIN: ICD-10-PCS | Mod: S$GLB,,, | Performed by: PHYSICIAN ASSISTANT

## 2020-10-20 PROCEDURE — 3008F PR BODY MASS INDEX (BMI) DOCUMENTED: ICD-10-PCS | Mod: CPTII,S$GLB,, | Performed by: PHYSICIAN ASSISTANT

## 2020-10-20 PROCEDURE — 3008F BODY MASS INDEX DOCD: CPT | Mod: CPTII,S$GLB,, | Performed by: PHYSICIAN ASSISTANT

## 2020-10-20 PROCEDURE — 99999 PR PBB SHADOW E&M-EST. PATIENT-LVL III: ICD-10-PCS | Mod: PBBFAC,,, | Performed by: PHYSICIAN ASSISTANT

## 2020-10-20 PROCEDURE — 99213 OFFICE O/P EST LOW 20 MIN: CPT | Mod: S$GLB,,, | Performed by: PHYSICIAN ASSISTANT

## 2020-10-20 NOTE — PROGRESS NOTES
CC: LEFT shoulder pain    Current HPI:  Patient presents today for follow-up evaluation of left shoulder pain.  Patient received a subacromial steroid injection on September 8th and has been performing formal physical therapy once weekly.  He has also been taking Mobic 15 mg as needed for left shoulder pain.  Patient reports significant improvement in range of motion and pain.  He would like to discontinue formal physical therapy and perform home exercises as needed.    Interval Hx (9/8/2020):  Patient presents today for follow-up evaluation of left shoulder pain.  He is here today to discuss left shoulder MRI results.  Patient states that over the weekend he was hitting some tennis balls which aggravated his shoulder, but this since calmed down.  No new issues.  Patient states that he has been taking Mobic 15 mg daily with good relief of pain.  He would like to discuss treatment options moving forward.    Previous HPI (08/28/2020):  55 y.o. Male with a history of left shoulder pain who presents for initial evaluation as a new patient.  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  Patient denies any specific mechanism of injury or trauma that precipitated this pain, but states he has had continued difficulty with overhead movement and feels that the pain is more frequent.  Patient states that about 3 weeks ago he was performing a freestyle swimming stroke when he felt sharp left shoulder pain deep inside his shoulder.  Patient states that this since calmed down.  He also reports difficulty reaching behind his back.  Denies any associated numbness or tingling.  Patient states that he also has difficulty sleeping on his left side.  Thus far treatment has included over-the-counter Aleve twice daily, and icing with only minimal relief.  He has not tried any physical therapy or injections  this point.    He reports that the pain and weakness is worse with overhead activity. It also bothers him at night.    Is affecting ADLs.  Pain is 7/10 at it's worst.      Past Medical History:   Diagnosis Date    Allergy     Eczema        Past Surgical History:   Procedure Laterality Date    COLONOSCOPY N/A 2/15/2016    Procedure: COLONOSCOPY;  Surgeon: STONE Sanchez MD;  Location: [address]);  Service: Endoscopy;  Laterality: N/A;  PM Prep    HERNIA REPAIR         Family History   Problem Relation Age of Onset    Cancer Mother         lung    Breast cancer Mother     Cancer Father         liver    Lung disease Sister     No Known Problems Brother     No Known Problems Sister     Asthma Neg Hx          Current Outpatient Medications:     meloxicam (MOBIC) 15 MG tablet, Take 1 tablet (15 mg total) by mouth once daily. (Patient not taking: Reported on 9/30/2020), Disp: 30 tablet, Rfl: 2    Current Facility-Administered Medications:     cefTRIAXone injection 1 g, 1 g, Intramuscular, Q24H, Anthony Wyman MD, 1 g at 06/21/19 0661    Review of patient's allergies indicates:   Allergen Reactions    Erythromycin     Macrolide antibiotics           REVIEW OF SYSTEMS:  Constitution: Negative. Negative for chills, fever and night sweats.   HENT: Negative for congestion and headaches.    Eyes: Negative for blurred vision, left vision loss and right vision loss.   Cardiovascular: Negative for chest pain and syncope.   Respiratory: Negative for cough and shortness of breath.    Endocrine: Negative for polydipsia, polyphagia and polyuria.   Hematologic/Lymphatic: Negative for bleeding problem. Does not bruise/bleed easily.   Skin: Negative for dry skin, itching and rash.   Musculoskeletal: Negative for falls.  Positive for left shoulder pain and muscle weakness.   Gastrointestinal: Negative for abdominal pain and bowel incontinence.   Genitourinary: Negative for bladder incontinence and nocturia.    Neurological: Negative for disturbances in coordination, loss of balance and seizures.   Psychiatric/Behavioral: Negative for depression. The patient does not have insomnia.    Allergic/Immunologic: Negative for hives and persistent infections.      PHYSICAL EXAMINATION:  Vitals:  There were no vitals taken for this visit.   General: The patient is alert and oriented x 3.  Mood is pleasant.  Observation of ears, eyes and nose reveal no gross abnormalities.  No labored breathing observed.  Gait is coordinated. Patient can toe walk and heel walk without difficulty.      LEFT Shoulder / Upper Extremity Exam    OBSERVATION:     Swelling  none  Deformity  none   Discoloration  none   Scapular winging none   Scars   none  Atrophy  none    TENDERNESS / CREPITUS (T/C):          T/C      T/C   Clavicle   -/-  SUPRAspinatus    -/-     AC Jt.    +/-  INFRAspinatus  -/-    SC Jt.    -/-  Deltoid    -/-      G. Tuberosity  -/-  LH BICEP groove  +/-   Acromion:  -/-  Midline Neck   -/-     Scapular Spine -/-  Trapezium   -/-   SMA Scapula  -/-  GH jt. line - post  +/-     Scapulothoracic  -/-         ROM: (* = with pain)  Right shoulder   Left shoulder        AROM (PROM)   AROM (PROM)   FE    170° (175°)     170°* (170°*)     ER at 0°    50°  (60°)    30°  (65°)    IR (spine level)   T10     L5*    STRENGTH: (* = with pain) Right shoulder   Left shoulder    SCAPTION   5/5    4+/5*    IR    5/5    5/5   ER    5/5    5/5   BICEPS   5/5    5/5   Deltoid    5/5    4+/5*     SIGNS:  Painful side       NEER   neg    ORENETTAS  neg    PATHAK   +    SPEEDS  neg     DROP ARM   neg   BELLY PRESS neg   Superior escape none    LIFT-OFF  neg   X-Body ADD    neg    MOVING VALGUS neg        STABILITY TESTING    Right shoulder   Left shoulder    Translation     Anterior  up face     up face    Posterior  up face    up face    Sulcus   < 10mm    < 10 mm     Signs   Apprehension   neg      neg       Relocation   no change     no  change      Jerk test  neg     neg    EXTREMITY NEURO-VASCULAR EXAM:    Sensation grossly intact to light touch all dermatomal regions.    DTR 2+ Biceps, Triceps, BR and Negative Fitos sign   Grossly intact motor function at Elbow, Wrist and Hand   Distal pulses radial and ulnar 2+, brisk cap refill, symmetric.      NECK:  Painless FROM and spinous processes non-tender. Negative Spurlings sign.      OTHER FINDINGS:  N/A     XRAYS left shoulder (08/26/2020):  FINDINGS:  Bones: No fracture.  No lytic or blastic lesion.     Joints: No evidence for glenohumeral dislocation.  Acromioclavicular joint is unremarkable.     Soft tissues: Unremarkable.  No calcifications identified to suggest calcific tendinitis.    MRI left shoulder (9/5/2020):  Impression:     No evidence of rotator cuff tear.     Findings suspicious for adhesive capsulitis as detailed above.     Mild AC joint arthropathy.      ASSESSMENT:     Left shoulder pain  Adhesive capsulitis, left shoulder      PLAN:      1.  I made the decision to obtain old records of the patient including previous notes and imaging. I independently reviewed and interpreted the radiographs and/or MRIs today as well as prior imaging. Reviewed MRI and radiograph results with patient in detail.    2.  HEP 46623 - Royer Hauser instructed and demonstrated a shoulder/periscapular HEP. The patient then demonstrated understanding of exercises and proper technique. This program was performed for 5 minutes.     3.  Continue Mobic 15 mg 1 p.o. q.d.  May safely alternate with Tylenol if needed.    4.  Ambulatory referral to formal physical therapy at [facility] location.    5.  Follow up in 6 weeks    All questions were answered, patient will contact us for questions or concerns in the interim.      Medical Dictation software was used during the dictation of portions or the entirety of this medical record.  Phonetic or grammatic errors may exist due to the use of this software. For  clarification, refer to the author of the document.

## 2020-10-27 ENCOUNTER — PATIENT MESSAGE (OUTPATIENT)
Dept: INTERNAL MEDICINE | Facility: CLINIC | Age: 55
End: 2020-10-27

## 2020-10-27 DIAGNOSIS — H53.9 VISUAL DISTURBANCE: Primary | ICD-10-CM

## 2020-10-28 ENCOUNTER — TELEPHONE (OUTPATIENT)
Dept: INTERNAL MEDICINE | Facility: CLINIC | Age: 55
End: 2020-10-28

## 2020-10-28 ENCOUNTER — PATIENT MESSAGE (OUTPATIENT)
Dept: INTERNAL MEDICINE | Facility: CLINIC | Age: 55
End: 2020-10-28

## 2020-10-28 RX ORDER — AMOXICILLIN 875 MG/1
875 TABLET, FILM COATED ORAL 2 TIMES DAILY
Qty: 20 TABLET | Refills: 0 | Status: SHIPPED | OUTPATIENT
Start: 2020-10-28 | End: 2022-08-01

## 2020-10-28 NOTE — TELEPHONE ENCOUNTER
Pt states he think he ha a ear infection t is painful and pressure for last 2 days he is requesting antibiotic

## 2020-11-10 ENCOUNTER — OFFICE VISIT (OUTPATIENT)
Dept: OPTOMETRY | Facility: CLINIC | Age: 55
End: 2020-11-10
Payer: COMMERCIAL

## 2020-11-10 DIAGNOSIS — H01.004 BLEPHARITIS OF UPPER EYELIDS OF BOTH EYES, UNSPECIFIED TYPE: ICD-10-CM

## 2020-11-10 DIAGNOSIS — H01.001 BLEPHARITIS OF UPPER EYELIDS OF BOTH EYES, UNSPECIFIED TYPE: ICD-10-CM

## 2020-11-10 DIAGNOSIS — H53.9 VISUAL DISTURBANCE: ICD-10-CM

## 2020-11-10 DIAGNOSIS — H43.812 PVD (POSTERIOR VITREOUS DETACHMENT), LEFT EYE: Primary | ICD-10-CM

## 2020-11-10 PROCEDURE — 99999 PR PBB SHADOW E&M-EST. PATIENT-LVL III: ICD-10-PCS | Mod: PBBFAC,,, | Performed by: OPTOMETRIST

## 2020-11-10 PROCEDURE — 92014 PR EYE EXAM, EST PATIENT,COMPREHESV: ICD-10-PCS | Mod: S$GLB,,, | Performed by: OPTOMETRIST

## 2020-11-10 PROCEDURE — 92014 COMPRE OPH EXAM EST PT 1/>: CPT | Mod: S$GLB,,, | Performed by: OPTOMETRIST

## 2020-11-10 PROCEDURE — 99999 PR PBB SHADOW E&M-EST. PATIENT-LVL III: CPT | Mod: PBBFAC,,, | Performed by: OPTOMETRIST

## 2020-11-10 NOTE — LETTER
November 10, 2020      Anthony Wyman MD  [address]  [facility] [address]           [facility]  [address]  [facility] [address]  Phone: [phone]          Patient: Naldo Barakat   MR Number: [id number]   YOB: 1965   Date of Visit: 11/10/2020       Dear Dr. Anthony Wyman:    Thank you for referring Naldo Barakat to me for evaluation. Attached you will find relevant portions of my assessment and plan of care.    If you have questions, please do not hesitate to call me. I look forward to following Naldo Barakat along with you.    Sincerely,    Brooklyn Esparza, OD    Enclosure  CC:  No Recipients    If you would like to receive this communication electronically, please contact [email or web address] or [phone] to request more information on [facility] access.    For providers and/or their staff who would like to refer a patient to [facility], please contact us through our one-stop-shop provider referral line, [facility], at [phone].    If you feel you have received this communication in error or would no longer like to receive these types of communications, please e-mail [email or web address]

## 2020-11-10 NOTE — PROGRESS NOTES
HPI     CC: Few weeks ago OS saw flashes at night (lightening strikes) in corner   of eye. Occurred for 2-3 days. Hasnt happened since then. (-)floaters   (-)curtain over vision (-)missing spots in vision   OS also seems to be matted shut in the morning.   No issues with the right eye.    MARI: 1-2 years ago   (-) Changes in vision   (-) Pain, irritation, itching   (+) Flashes  (-) Floaters  (+) SRx, distance vision specs that he uses prn (did not bring with him   today)  (-) CLs  (-) Eye gtts    (-) Eye injury  (-) Eye surgery   (-)POHx  (-)FOHx    Reports no new medications or Dx in the last month.       Last edited by Brooklyn Esparza, OD on 11/10/2020  5:45 PM. (History)            Assessment /Plan     For exam results, see Encounter Report.    PVD (posterior vitreous detachment), left eye    Visual disturbance  -     Ambulatory referral/consult to Ophthalmology    Blepharitis of upper eyelids of both eyes, unspecified type      1-2. Educated pt on findings. No holes, tears, detachments 360 OU. (-)tyler sign OU. Educated on s/s of RD and to RTC ASAP if occur. Monitor 1 month unless changes noted sooner.     3. Educated pt on findings. Recommend J&J baby shampoo or Ocusoft lid scrubs along eyelid margin Qday. AT prn.   If symptoms worsen or don't improve, RTC. Monitor.       RTC in 1 month for PVD f/u or sooner if needed.

## 2020-11-17 ENCOUNTER — PATIENT MESSAGE (OUTPATIENT)
Dept: INTERNAL MEDICINE | Facility: CLINIC | Age: 55
End: 2020-11-17

## 2020-11-23 ENCOUNTER — OFFICE VISIT (OUTPATIENT)
Dept: URGENT CARE | Facility: CLINIC | Age: 55
End: 2020-11-23
Payer: COMMERCIAL

## 2020-11-23 VITALS
OXYGEN SATURATION: 98 % | HEIGHT: 71 IN | WEIGHT: 265 LBS | DIASTOLIC BLOOD PRESSURE: 90 MMHG | SYSTOLIC BLOOD PRESSURE: 138 MMHG | HEART RATE: 88 BPM | RESPIRATION RATE: 20 BRPM | BODY MASS INDEX: 37.1 KG/M2 | TEMPERATURE: 98 F

## 2020-11-23 DIAGNOSIS — Z20.822 ENCOUNTER FOR LABORATORY TESTING FOR COVID-19 VIRUS: ICD-10-CM

## 2020-11-23 DIAGNOSIS — R05.9 COUGH: Primary | ICD-10-CM

## 2020-11-23 LAB
CTP QC/QA: YES
SARS-COV-2 RDRP RESP QL NAA+PROBE: NEGATIVE

## 2020-11-23 PROCEDURE — 3008F PR BODY MASS INDEX (BMI) DOCUMENTED: ICD-10-PCS | Mod: CPTII,S$GLB,, | Performed by: PHYSICIAN ASSISTANT

## 2020-11-23 PROCEDURE — 3008F BODY MASS INDEX DOCD: CPT | Mod: CPTII,S$GLB,, | Performed by: PHYSICIAN ASSISTANT

## 2020-11-23 PROCEDURE — U0002: ICD-10-PCS | Mod: QW,S$GLB,, | Performed by: PHYSICIAN ASSISTANT

## 2020-11-23 PROCEDURE — 99213 OFFICE O/P EST LOW 20 MIN: CPT | Mod: S$GLB,,, | Performed by: PHYSICIAN ASSISTANT

## 2020-11-23 PROCEDURE — U0002 COVID-19 LAB TEST NON-CDC: HCPCS | Mod: QW,S$GLB,, | Performed by: PHYSICIAN ASSISTANT

## 2020-11-23 PROCEDURE — 99213 PR OFFICE/OUTPT VISIT, EST, LEVL III, 20-29 MIN: ICD-10-PCS | Mod: S$GLB,,, | Performed by: PHYSICIAN ASSISTANT

## 2020-11-23 NOTE — PATIENT INSTRUCTIONS
Allergic vs. Viral cough   - Rest.    - Drink plenty of fluids.      - Tylenol or Ibuprofen as directed as needed for fever/pain. Avoid tylenol if you have a history of liver disease. Do not take ibuprofen if you have a history of GI bleeding, kidney disease, or if you take blood thinners.     - You can take over-the-counter claritin, zyrtec, allegra, or xyzal as directed. These are antihistamines that can help with runny nose, nasal congestion, sneezing, and helps to dry up post-nasal drip, which usually causes sore throat and cough.   - If you do NOT have high blood pressure, you may use a decongestant form (D)  of this medication or if you do not take the D form, you can take sudafed  (pseudoephedrine) over the counter, which is a decongestant.    - You can use Flonase (fluticasone) nasal spray as directed for sinus congestion and postnasal drip. This is a steroid nasal spray that works locally over time to decrease the inflammation in your nose/sinuses and help with allergic symptoms. This is not an quick- relief spray like afrin, but it works well if used daily.  Discontinue if you develop nose bleed  - use nasal saline prior to Flonase.    - Use Ocean Spray Nasal Saline 1-3 puffs each nostril every 2-3 hours then blow out onto tissue. This is to irrigate the nasal passage way to clear the sinus openings. Use until sinus problem resolved.    - you can take plain Mucinex (guaifenesin) 1200 mg twice a day to help loosen mucous  - Dextromethorphan (DM) is a cough suppressant over the counter (ie. mucinex DM, robitussin, delsym; dayquil/nyquil has DM as well.)    -warm salt water gargles can help with sore throat    - warm tea with honey can help with cough. Honey is a natural cough suppressant.    - Follow up with your PCP or specialty clinic as directed in the next 1-2 weeks if not improved or as needed.  You can call [phone] to schedule an appointment with the appropriate provider.      - Go to the ER if  you develop new or worsening symptoms.     - You must understand that you have received an Urgent Care treatment only and that you may be released before all of your medical problems are known or treated.   - You, the patient, will arrange for follow up care as instructed.   - If your condition worsens or fails to improve we recommend that you receive another evaluation at the ER immediately or contact your PCP to discuss your concerns or return here.

## 2020-11-23 NOTE — PROGRESS NOTES
"Subjective:       Patient ID: Naldo Barakat is a 55 y.o. male.    Vitals:  height is 5' 11" (1.803 m) and weight is 120.2 kg (264 lb 15.9 oz). His temperature is 98.3 °F (36.8 °C). His blood pressure is 138/90 (abnormal) and his pulse is 88. His respiration is 20 and oxygen saturation is 98%.     Chief Complaint: Cough    Patient here today requesting COVID testing. He states that he woke up this morning with mild cough. No other associated symptoms. He is traveling for [employer] and wants to rule out covid. His daughter has a uri currently. No fever, dyspnea.     Cough  This is a new problem. The problem has been unchanged. The cough is non-productive. Pertinent negatives include no chest pain, chills, ear congestion, ear pain, fever, headaches, heartburn, hemoptysis, myalgias, nasal congestion, postnasal drip, rash, rhinorrhea, sore throat, shortness of breath, sweats, weight loss or wheezing.       Constitution: Negative for appetite change, chills, sweating, fatigue and fever.   HENT: Negative for ear pain, congestion, postnasal drip and sore throat.    Neck: Negative for neck pain, neck stiffness and painful lymph nodes.   Cardiovascular: Negative for chest pain, leg swelling, palpitations and sob on exertion.   Eyes: Negative for double vision and blurred vision.   Respiratory: Positive for cough. Negative for sputum production, bloody sputum, shortness of breath and wheezing.    Gastrointestinal: Negative for nausea, vomiting, diarrhea and heartburn.   Genitourinary: Negative for dysuria, frequency, urgency and flank pain.   Musculoskeletal: Negative for joint pain, joint swelling, muscle cramps and muscle ache.   Skin: Negative for color change, pale and rash.   Allergic/Immunologic: Negative for seasonal allergies.   Neurological: Negative for dizziness, history of vertigo, light-headedness, passing out and headaches.   Hematologic/Lymphatic: Negative for swollen lymph nodes, easy bruising/bleeding and " history of blood clots. Does not bruise/bleed easily.   Psychiatric/Behavioral: Negative for nervous/anxious, sleep disturbance and depression. The patient is not nervous/anxious.        Objective:      Physical Exam   Constitutional: He is oriented to person, place, and time. He appears well-developed. He is cooperative.  Non-toxic appearance. He does not appear ill. No distress.   HENT:   Head: Normocephalic and atraumatic.   Ears:   Right Ear: Hearing normal.   Left Ear: Hearing normal.   Nose: Nose normal. No mucosal edema, rhinorrhea, purulent discharge, nose lacerations or nasal deformity. No epistaxis. Right sinus exhibits no maxillary sinus tenderness and no frontal sinus tenderness. Left sinus exhibits no maxillary sinus tenderness and no frontal sinus tenderness.   Mouth/Throat: Uvula is midline, oropharynx is clear and moist and mucous membranes are normal. No trismus in the jaw. Normal dentition. No uvula swelling. No oropharyngeal exudate, posterior oropharyngeal edema, posterior oropharyngeal erythema, tonsillar abscesses or cobblestoning. Tonsils are 1+ on the right. Tonsils are 1+ on the left. No tonsillar exudate.   Eyes: Conjunctivae and lids are normal. No scleral icterus.   Neck: Trachea normal, full passive range of motion without pain and phonation normal. Neck supple. No neck rigidity. No edema and no erythema present.   Cardiovascular: Normal rate, regular rhythm, normal heart sounds and normal pulses.   Pulmonary/Chest: Effort normal and breath sounds normal. No accessory muscle usage or stridor. No tachypnea and no bradypnea. No respiratory distress. He has no decreased breath sounds. He has no wheezes. He has no rhonchi. He has no rales.   Abdominal: Normal appearance.   Musculoskeletal: Normal range of motion.         General: No deformity.   Lymphadenopathy:        Head (right side): No submandibular, no preauricular and no posterior auricular adenopathy present.        Head (left side):  No submandibular, no preauricular and no posterior auricular adenopathy present.     He has no cervical adenopathy.   Neurological: He is alert and oriented to person, place, and time. He exhibits normal muscle tone. Coordination normal.   Skin: Skin is warm, dry, intact, not diaphoretic and not pale. Psychiatric: His speech is normal and behavior is normal. Judgment and thought content normal.   Nursing note and vitals reviewed.      Results for orders placed or performed in visit on 11/23/20   POCT COVID-19 Rapid Screening   Result Value Ref Range    POC Rapid COVID Negative Negative     Acceptable Yes          Assessment:       1. Cough    2. Encounter for laboratory testing for COVID-19 virus        Plan:         Cough  -     POCT COVID-19 Rapid Screening    Encounter for laboratory testing for COVID-19 virus      Patient Instructions   Allergic vs. Viral cough   - Rest.    - Drink plenty of fluids.      - Tylenol or Ibuprofen as directed as needed for fever/pain. Avoid tylenol if you have a history of liver disease. Do not take ibuprofen if you have a history of GI bleeding, kidney disease, or if you take blood thinners.     - You can take over-the-counter claritin, zyrtec, allegra, or xyzal as directed. These are antihistamines that can help with runny nose, nasal congestion, sneezing, and helps to dry up post-nasal drip, which usually causes sore throat and cough.   - If you do NOT have high blood pressure, you may use a decongestant form (D)  of this medication or if you do not take the D form, you can take sudafed  (pseudoephedrine) over the counter, which is a decongestant.    - You can use Flonase (fluticasone) nasal spray as directed for sinus congestion and postnasal drip. This is a steroid nasal spray that works locally over time to decrease the inflammation in your nose/sinuses and help with allergic symptoms. This is not an quick- relief spray like afrin, but it works well if used  daily.  Discontinue if you develop nose bleed  - use nasal saline prior to Flonase.    - Use Ocean Spray Nasal Saline 1-3 puffs each nostril every 2-3 hours then blow out onto tissue. This is to irrigate the nasal passage way to clear the sinus openings. Use until sinus problem resolved.    - you can take plain Mucinex (guaifenesin) 1200 mg twice a day to help loosen mucous  - Dextromethorphan (DM) is a cough suppressant over the counter (ie. mucinex DM, robitussin, delsym; dayquil/nyquil has DM as well.)    -warm salt water gargles can help with sore throat    - warm tea with honey can help with cough. Honey is a natural cough suppressant.    - Follow up with your PCP or specialty clinic as directed in the next 1-2 weeks if not improved or as needed.  You can call [phone] to schedule an appointment with the appropriate provider.      - Go to the ER if you develop new or worsening symptoms.     - You must understand that you have received an Urgent Care treatment only and that you may be released before all of your medical problems are known or treated.   - You, the patient, will arrange for follow up care as instructed.   - If your condition worsens or fails to improve we recommend that you receive another evaluation at the ER immediately or contact your PCP to discuss your concerns or return here.

## 2020-12-17 ENCOUNTER — PATIENT OUTREACH (OUTPATIENT)
Dept: ADMINISTRATIVE | Facility: OTHER | Age: 55
End: 2020-12-17

## 2020-12-18 NOTE — PROGRESS NOTES
Requested updates within Care Everywhere.  Patient's chart was reviewed for overdue ALYSSA topics.  Immunizations reconciled.

## 2020-12-19 ENCOUNTER — IMMUNIZATION (OUTPATIENT)
Dept: INTERNAL MEDICINE | Facility: CLINIC | Age: 55
End: 2020-12-19
Payer: COMMERCIAL

## 2020-12-19 DIAGNOSIS — Z23 NEED FOR VACCINATION: ICD-10-CM

## 2020-12-19 PROCEDURE — 91300 COVID-19, MRNA, LNP-S, PF, 30 MCG/0.3 ML DOSE VACCINE: ICD-10-PCS | Mod: ,,, | Performed by: INTERNAL MEDICINE

## 2020-12-19 PROCEDURE — 91300 COVID-19, MRNA, LNP-S, PF, 30 MCG/0.3 ML DOSE VACCINE: CPT | Mod: ,,, | Performed by: INTERNAL MEDICINE

## 2020-12-19 PROCEDURE — 0001A COVID-19, MRNA, LNP-S, PF, 30 MCG/0.3 ML DOSE VACCINE: ICD-10-PCS | Mod: CV19,,, | Performed by: INTERNAL MEDICINE

## 2020-12-19 PROCEDURE — 0001A COVID-19, MRNA, LNP-S, PF, 30 MCG/0.3 ML DOSE VACCINE: CPT | Mod: CV19,,, | Performed by: INTERNAL MEDICINE

## 2020-12-21 ENCOUNTER — OFFICE VISIT (OUTPATIENT)
Dept: OPTOMETRY | Facility: CLINIC | Age: 55
End: 2020-12-21
Payer: COMMERCIAL

## 2020-12-21 DIAGNOSIS — H43.812 PVD (POSTERIOR VITREOUS DETACHMENT), LEFT EYE: Primary | ICD-10-CM

## 2020-12-21 PROCEDURE — 99999 PR PBB SHADOW E&M-EST. PATIENT-LVL II: CPT | Mod: PBBFAC,,, | Performed by: OPTOMETRIST

## 2020-12-21 PROCEDURE — 99999 PR PBB SHADOW E&M-EST. PATIENT-LVL II: ICD-10-PCS | Mod: PBBFAC,,, | Performed by: OPTOMETRIST

## 2020-12-21 PROCEDURE — 1126F PR PAIN SEVERITY QUANTIFIED, NO PAIN PRESENT: ICD-10-PCS | Mod: S$GLB,,, | Performed by: OPTOMETRIST

## 2020-12-21 PROCEDURE — 92014 COMPRE OPH EXAM EST PT 1/>: CPT | Mod: S$GLB,,, | Performed by: OPTOMETRIST

## 2020-12-21 PROCEDURE — 92014 PR EYE EXAM, EST PATIENT,COMPREHESV: ICD-10-PCS | Mod: S$GLB,,, | Performed by: OPTOMETRIST

## 2020-12-21 PROCEDURE — 1126F AMNT PAIN NOTED NONE PRSNT: CPT | Mod: S$GLB,,, | Performed by: OPTOMETRIST

## 2020-12-21 NOTE — PROGRESS NOTES
HPI     Patient in for progress check.    Being followed for (diagnosis):   PVD OS    Date last seen:  11/10/20    Doctor last seen:  Dr Esparza    Prescribed eye medications(s) using:  None    OTC eye medication(s) using:  None    Signs/symptoms of condition: resolved, no new flashes/floaters/curtain   over vision     Last edited by Brooklyn Esparza, OD on 12/21/2020 10:39 AM. (History)            Assessment /Plan     For exam results, see Encounter Report.    PVD (posterior vitreous detachment), left eye      Educated pt. (-)holes, tears, detachments 360 OS. Symptoms have resolved per pt. Re-educated on s/s of RD and to RTC ASAP if occur. Discussed will eventually occur OD and should RTC if symptomatic.   Monitor yearly.      RTC in 1 year for annual eye exam or sooner if needed.

## 2021-01-09 ENCOUNTER — IMMUNIZATION (OUTPATIENT)
Dept: INTERNAL MEDICINE | Facility: CLINIC | Age: 56
End: 2021-01-09
Payer: COMMERCIAL

## 2021-01-09 DIAGNOSIS — Z23 NEED FOR VACCINATION: ICD-10-CM

## 2021-01-09 PROCEDURE — 91300 COVID-19, MRNA, LNP-S, PF, 30 MCG/0.3 ML DOSE VACCINE: CPT | Mod: PBBFAC | Performed by: INTERNAL MEDICINE

## 2021-01-09 PROCEDURE — 0002A COVID-19, MRNA, LNP-S, PF, 30 MCG/0.3 ML DOSE VACCINE: CPT | Mod: PBBFAC | Performed by: INTERNAL MEDICINE

## 2021-06-10 ENCOUNTER — PATIENT MESSAGE (OUTPATIENT)
Dept: INTERNAL MEDICINE | Facility: CLINIC | Age: 56
End: 2021-06-10

## 2021-06-11 ENCOUNTER — PATIENT MESSAGE (OUTPATIENT)
Dept: INTERNAL MEDICINE | Facility: CLINIC | Age: 56
End: 2021-06-11

## 2021-08-09 ENCOUNTER — PATIENT MESSAGE (OUTPATIENT)
Dept: INTERNAL MEDICINE | Facility: CLINIC | Age: 56
End: 2021-08-09

## 2021-08-09 DIAGNOSIS — U07.1 COVID-19: Primary | ICD-10-CM

## 2021-08-10 ENCOUNTER — PATIENT MESSAGE (OUTPATIENT)
Dept: INTERNAL MEDICINE | Facility: CLINIC | Age: 56
End: 2021-08-10

## 2021-08-10 ENCOUNTER — TELEPHONE (OUTPATIENT)
Dept: INTERNAL MEDICINE | Facility: CLINIC | Age: 56
End: 2021-08-10

## 2021-08-10 ENCOUNTER — LAB VISIT (OUTPATIENT)
Dept: INTERNAL MEDICINE | Facility: CLINIC | Age: 56
End: 2021-08-10

## 2021-08-10 DIAGNOSIS — R05.9 COUGH: ICD-10-CM

## 2021-08-10 DIAGNOSIS — R50.9 FEVER, UNSPECIFIED FEVER CAUSE: ICD-10-CM

## 2021-08-10 DIAGNOSIS — U07.1 COVID-19 VIRUS DETECTED: ICD-10-CM

## 2021-08-10 DIAGNOSIS — R50.9 FEVER, UNSPECIFIED FEVER CAUSE: Primary | ICD-10-CM

## 2021-08-10 LAB — SARS-COV-2 RDRP RESP QL NAA+PROBE: POSITIVE

## 2021-08-10 PROCEDURE — U0002 COVID-19 LAB TEST NON-CDC: HCPCS | Performed by: PREVENTIVE MEDICINE

## 2021-09-09 ENCOUNTER — PATIENT MESSAGE (OUTPATIENT)
Dept: INTERNAL MEDICINE | Facility: CLINIC | Age: 56
End: 2021-09-09

## 2021-09-21 ENCOUNTER — PATIENT MESSAGE (OUTPATIENT)
Dept: INTERNAL MEDICINE | Facility: CLINIC | Age: 56
End: 2021-09-21

## 2021-10-05 ENCOUNTER — PATIENT MESSAGE (OUTPATIENT)
Dept: ADMINISTRATIVE | Facility: HOSPITAL | Age: 56
End: 2021-10-05

## 2021-12-01 ENCOUNTER — IMMUNIZATION (OUTPATIENT)
Dept: INTERNAL MEDICINE | Facility: CLINIC | Age: 56
End: 2021-12-01
Payer: COMMERCIAL

## 2021-12-01 DIAGNOSIS — Z23 NEED FOR VACCINATION: Primary | ICD-10-CM

## 2021-12-01 PROCEDURE — 0004A COVID-19, MRNA, LNP-S, PF, 30 MCG/0.3 ML DOSE VACCINE: CPT | Mod: PBBFAC | Performed by: INTERNAL MEDICINE

## 2022-01-26 ENCOUNTER — PATIENT MESSAGE (OUTPATIENT)
Dept: ADMINISTRATIVE | Facility: HOSPITAL | Age: 57
End: 2022-01-26
Payer: COMMERCIAL

## 2022-01-27 ENCOUNTER — PATIENT MESSAGE (OUTPATIENT)
Dept: INTERNAL MEDICINE | Facility: CLINIC | Age: 57
End: 2022-01-27
Payer: COMMERCIAL

## 2022-01-27 DIAGNOSIS — J34.1 MUCOCELE OF NASAL SINUS: Primary | ICD-10-CM

## 2022-02-08 ENCOUNTER — OFFICE VISIT (OUTPATIENT)
Dept: OTOLARYNGOLOGY | Facility: CLINIC | Age: 57
End: 2022-02-08
Payer: COMMERCIAL

## 2022-02-08 VITALS — DIASTOLIC BLOOD PRESSURE: 109 MMHG | SYSTOLIC BLOOD PRESSURE: 161 MMHG | HEART RATE: 71 BPM

## 2022-02-08 DIAGNOSIS — J34.2 NASAL SEPTAL DEVIATION: ICD-10-CM

## 2022-02-08 DIAGNOSIS — J34.1 CYST OF MAXILLARY SINUS: Primary | ICD-10-CM

## 2022-02-08 DIAGNOSIS — J34.3 HYPERTROPHY OF BOTH INFERIOR NASAL TURBINATES: ICD-10-CM

## 2022-02-08 PROCEDURE — 99999 PR PBB SHADOW E&M-EST. PATIENT-LVL III: CPT | Mod: PBBFAC,,, | Performed by: STUDENT IN AN ORGANIZED HEALTH CARE EDUCATION/TRAINING PROGRAM

## 2022-02-08 PROCEDURE — 1159F MED LIST DOCD IN RCRD: CPT | Mod: CPTII,S$GLB,, | Performed by: STUDENT IN AN ORGANIZED HEALTH CARE EDUCATION/TRAINING PROGRAM

## 2022-02-08 PROCEDURE — 31231 PR NASAL ENDOSCOPY, DX: ICD-10-PCS | Mod: S$GLB,,, | Performed by: STUDENT IN AN ORGANIZED HEALTH CARE EDUCATION/TRAINING PROGRAM

## 2022-02-08 PROCEDURE — 3077F PR MOST RECENT SYSTOLIC BLOOD PRESSURE >= 140 MM HG: ICD-10-PCS | Mod: CPTII,S$GLB,, | Performed by: STUDENT IN AN ORGANIZED HEALTH CARE EDUCATION/TRAINING PROGRAM

## 2022-02-08 PROCEDURE — 3080F PR MOST RECENT DIASTOLIC BLOOD PRESSURE >= 90 MM HG: ICD-10-PCS | Mod: CPTII,S$GLB,, | Performed by: STUDENT IN AN ORGANIZED HEALTH CARE EDUCATION/TRAINING PROGRAM

## 2022-02-08 PROCEDURE — 99203 PR OFFICE/OUTPT VISIT, NEW, LEVL III, 30-44 MIN: ICD-10-PCS | Mod: 25,S$GLB,, | Performed by: STUDENT IN AN ORGANIZED HEALTH CARE EDUCATION/TRAINING PROGRAM

## 2022-02-08 PROCEDURE — 3077F SYST BP >= 140 MM HG: CPT | Mod: CPTII,S$GLB,, | Performed by: STUDENT IN AN ORGANIZED HEALTH CARE EDUCATION/TRAINING PROGRAM

## 2022-02-08 PROCEDURE — 3080F DIAST BP >= 90 MM HG: CPT | Mod: CPTII,S$GLB,, | Performed by: STUDENT IN AN ORGANIZED HEALTH CARE EDUCATION/TRAINING PROGRAM

## 2022-02-08 PROCEDURE — 99203 OFFICE O/P NEW LOW 30 MIN: CPT | Mod: 25,S$GLB,, | Performed by: STUDENT IN AN ORGANIZED HEALTH CARE EDUCATION/TRAINING PROGRAM

## 2022-02-08 PROCEDURE — 99999 PR PBB SHADOW E&M-EST. PATIENT-LVL III: ICD-10-PCS | Mod: PBBFAC,,, | Performed by: STUDENT IN AN ORGANIZED HEALTH CARE EDUCATION/TRAINING PROGRAM

## 2022-02-08 PROCEDURE — 31231 NASAL ENDOSCOPY DX: CPT | Mod: S$GLB,,, | Performed by: STUDENT IN AN ORGANIZED HEALTH CARE EDUCATION/TRAINING PROGRAM

## 2022-02-08 PROCEDURE — 1159F PR MEDICATION LIST DOCUMENTED IN MEDICAL RECORD: ICD-10-PCS | Mod: CPTII,S$GLB,, | Performed by: STUDENT IN AN ORGANIZED HEALTH CARE EDUCATION/TRAINING PROGRAM

## 2022-02-08 NOTE — PROGRESS NOTES
Otolaryngology - Head and Neck Surgery New Patient Visit    2/8/2022    Referring Provider: Anthony Wymna MD    Chief Complaint   Patient presents with    cyst in sinus       History of Present Illness, Otolaryngology Specialty-Specific Exam, and Assessment and Plan:     Naldo Barakat is a 56 y.o. male who presents for evaluation of left maxillary retention cyst, which has been present for many years.He reports having seen his dentist who is wanting to perform a sinus lift for post implant. A Panorex was obtained that showed a left maxillary cyst. Dr. Barakat reports that he has had this for many years, and it was first noticed by one of his previous dentists.  He denies any significant sinonasal complaints. He reports maybe 1 sinus infection a year, but has not had any sinus infections since frequently wearing a mask during the pandemic. He has been treated with flonase, steroid pack and amoxicillin in the past. He has never had allergy testing. He denies previous skullbase surgery. He denies snoring.     Past Medical History:   Diagnosis Date    Allergy     Eczema        He had a CT of the sinuses that I was able to review from his dentist. (image in media)    On exam today, the ears are normal. The oral cavity is clear. The neck is clear. The nasopharynx, hypopharynx, and larynx are normal. Nasal endoscopy reveals left sided septal spur. Erythematous mucosa throughout the nasal cavity. No masses or nasal polyposis. Some thick clear secretions throughout the nasal cavity. No significant mucopurulent material.     Impression today is chronic sinusitis with left maxillary retention cyst. I have recommended that he start using intranasal steroids. No contraindication to performing sinus list. If  Roshni had any sinonasal complaints/symptoms would advocate for ESS prior to sinus lift, however he is asymptomatic without any evidence of acute sinus infection on today's exam.      Thank you for allowing us  to participate in the care of your patient. We will continue to keep you informed of his progress. He can follow up with me in clinic as needed.     Sincerely yours,    Ez Dodd MD      Objective     Physical Examination  Vitals -  blood pressure is 161/109 (abnormal) and his pulse is 71.   Constitutional - General appearance: Normal. Ability to communicate: Normal.  Head & Face - Overall appearance, scars, masses: Normal. Palpation &/or percussion of face: Normal. Salivary glands: Normal. Facial strength: Normal  ENMT - Otoscopic exam: Normal. Assessment of hearing: Normal. External inspection: Normal. Nasal mucosa, septum, turbinates: Abnormal see exam details. Lips, teeth, gums: Normal. Oropharynx: Normal. Pharyngeal walls/pyriform sinus: Normal. Larynx: Normal. Nasopharynx: Normal  Neck - Neck: Normal. Thyroid: Normal  Lymphatic - Palpation of lymph nodes: Normal  Eyes - Ocular mobility: Normal  Respiratory - Inspection of Chest: Normal  Cardiovascular - Peripheral vascular system: Normal  Neurological/Psychiatric - Orientation: Normal    Answers for HPI/ROS submitted by the patient on 2/8/2022  None of these: Yes  Tooth/Dental Problems?: Yes  None of these : Yes  Snoring?: Yes  Sleep Apnea?: Yes  cough: Yes  None of these : Yes  None of these: Yes  None of these: Yes  None of these: Yes  None of these : Yes  None of these: Yes  None of these : Yes  None of these: Yes  None of these: Yes  None of these: Yes    Review of Systems  A complete review of systems was obtained 02/08/2022 and reviewed.  The review of systems is negative for symptoms except as described above.    BP (!) 161/109 (BP Location: Right arm)   Pulse 71      Nasal Endoscopy:  2/8/2022    The use of diagnostic nasal endoscopy was considered medically necessary for the evaluation and visualization of the nasal anatomy for symptoms suggestive of nasal or sinus origin. Physical examination (including a nasal speculum evaluation) did not  provide sufficient clinical information to establish a diagnosis, or symptoms did not improve or worsened following treatment.     The nasal cavity was decongested with topical 1% phenylephrine and anesthetized with 4% lidocaine.  A rigid 0-degree endoscope was introduced into the nasal cavity.    The patient was seated in the examination chair. After discussion of risks and benefits, a nasal endoscope was inserted into the nose the endoscope was passed along the left nasal floor to the nasopharynx. It was then passed between the middle and superior meatus, nasal turbinates, nasal septum, nasopharynx and sphenoethmoid region. The nasal endoscope was withdrawn and there was no complications. An identical procedure was performed on the right side. I was present for the entire procedure.The patient tolerated the above procedure well. The findings of this procedure can be found in the dictated note from 2/8/2022 visit.                                  Data Reviewed    WBC (K/uL)   Date Value   09/25/2020 6.57     Eosinophil % (%)   Date Value   09/25/2020 1.2     Eos # (K/uL)   Date Value   09/25/2020 0.1     Platelets (K/uL)   Date Value   09/25/2020 259     Glucose (mg/dL)   Date Value   09/25/2020 95     No results found for: IGE    I independently reviewed the images of the CT sinuses dated unknown. Pertinent findings include left sided mucosal thickening in the maxillary sinus, more prominent on the floor. No other notable mucosal thickening throughout his paranasal sinuses.

## 2022-02-09 RX ORDER — FLUTICASONE PROPIONATE 50 MCG
1 SPRAY, SUSPENSION (ML) NASAL DAILY
Qty: 16 G | Refills: 6 | Status: SHIPPED | OUTPATIENT
Start: 2022-02-09 | End: 2023-03-20

## 2022-02-22 ENCOUNTER — PATIENT MESSAGE (OUTPATIENT)
Dept: RESEARCH | Facility: HOSPITAL | Age: 57
End: 2022-02-22
Payer: COMMERCIAL

## 2022-07-29 ENCOUNTER — PATIENT MESSAGE (OUTPATIENT)
Dept: INTERNAL MEDICINE | Facility: CLINIC | Age: 57
End: 2022-07-29
Payer: COMMERCIAL

## 2022-07-29 DIAGNOSIS — R05.3 CHRONIC COUGH: Primary | ICD-10-CM

## 2022-08-01 ENCOUNTER — OFFICE VISIT (OUTPATIENT)
Dept: INTERNAL MEDICINE | Facility: CLINIC | Age: 57
End: 2022-08-01
Payer: COMMERCIAL

## 2022-08-01 ENCOUNTER — HOSPITAL ENCOUNTER (OUTPATIENT)
Dept: RADIOLOGY | Facility: HOSPITAL | Age: 57
Discharge: HOME | End: 2022-08-01
Attending: INTERNAL MEDICINE
Payer: COMMERCIAL

## 2022-08-01 VITALS
HEART RATE: 63 BPM | BODY MASS INDEX: 34.75 KG/M2 | WEIGHT: 248.25 LBS | DIASTOLIC BLOOD PRESSURE: 78 MMHG | HEIGHT: 71 IN | SYSTOLIC BLOOD PRESSURE: 132 MMHG | OXYGEN SATURATION: 97 %

## 2022-08-01 DIAGNOSIS — R05.3 CHRONIC COUGH: ICD-10-CM

## 2022-08-01 DIAGNOSIS — K21.9 LARYNGOPHARYNGEAL REFLUX (LPR): ICD-10-CM

## 2022-08-01 DIAGNOSIS — R05.3 PERSISTENT COUGH: Primary | ICD-10-CM

## 2022-08-01 PROCEDURE — 99999 PR PBB SHADOW E&M-EST. PATIENT-LVL III: CPT | Mod: PBBFAC,,, | Performed by: INTERNAL MEDICINE

## 2022-08-01 PROCEDURE — 3078F DIAST BP <80 MM HG: CPT | Mod: CPTII,S$GLB,, | Performed by: INTERNAL MEDICINE

## 2022-08-01 PROCEDURE — 3008F BODY MASS INDEX DOCD: CPT | Mod: CPTII,S$GLB,, | Performed by: INTERNAL MEDICINE

## 2022-08-01 PROCEDURE — 1160F RVW MEDS BY RX/DR IN RCRD: CPT | Mod: CPTII,S$GLB,, | Performed by: INTERNAL MEDICINE

## 2022-08-01 PROCEDURE — 3008F PR BODY MASS INDEX (BMI) DOCUMENTED: ICD-10-PCS | Mod: CPTII,S$GLB,, | Performed by: INTERNAL MEDICINE

## 2022-08-01 PROCEDURE — 71046 X-RAY EXAM CHEST 2 VIEWS: CPT | Mod: 26,,, | Performed by: RADIOLOGY

## 2022-08-01 PROCEDURE — 1160F PR REVIEW ALL MEDS BY PRESCRIBER/CLIN PHARMACIST DOCUMENTED: ICD-10-PCS | Mod: CPTII,S$GLB,, | Performed by: INTERNAL MEDICINE

## 2022-08-01 PROCEDURE — 71046 XR CHEST PA AND LATERAL: ICD-10-PCS | Mod: 26,,, | Performed by: RADIOLOGY

## 2022-08-01 PROCEDURE — 99214 OFFICE O/P EST MOD 30 MIN: CPT | Mod: S$GLB,,, | Performed by: INTERNAL MEDICINE

## 2022-08-01 PROCEDURE — 1159F PR MEDICATION LIST DOCUMENTED IN MEDICAL RECORD: ICD-10-PCS | Mod: CPTII,S$GLB,, | Performed by: INTERNAL MEDICINE

## 2022-08-01 PROCEDURE — 99214 PR OFFICE/OUTPT VISIT, EST, LEVL IV, 30-39 MIN: ICD-10-PCS | Mod: S$GLB,,, | Performed by: INTERNAL MEDICINE

## 2022-08-01 PROCEDURE — 71046 X-RAY EXAM CHEST 2 VIEWS: CPT | Mod: TC

## 2022-08-01 PROCEDURE — 1159F MED LIST DOCD IN RCRD: CPT | Mod: CPTII,S$GLB,, | Performed by: INTERNAL MEDICINE

## 2022-08-01 PROCEDURE — 3075F PR MOST RECENT SYSTOLIC BLOOD PRESS GE 130-139MM HG: ICD-10-PCS | Mod: CPTII,S$GLB,, | Performed by: INTERNAL MEDICINE

## 2022-08-01 PROCEDURE — 3075F SYST BP GE 130 - 139MM HG: CPT | Mod: CPTII,S$GLB,, | Performed by: INTERNAL MEDICINE

## 2022-08-01 PROCEDURE — 99999 PR PBB SHADOW E&M-EST. PATIENT-LVL III: ICD-10-PCS | Mod: PBBFAC,,, | Performed by: INTERNAL MEDICINE

## 2022-08-01 PROCEDURE — 3078F PR MOST RECENT DIASTOLIC BLOOD PRESSURE < 80 MM HG: ICD-10-PCS | Mod: CPTII,S$GLB,, | Performed by: INTERNAL MEDICINE

## 2022-08-01 RX ORDER — PREDNISONE 20 MG/1
TABLET ORAL
Qty: 9 TABLET | Refills: 0 | Status: SHIPPED | OUTPATIENT
Start: 2022-08-01 | End: 2023-03-20

## 2022-08-01 NOTE — PROGRESS NOTES
57-year-old male  Presents with persistent cough for about a month.  He is notice he was having a dry nonproductive cough within the 1st week of July.  It eventually improved.  Went on a business trip and got back and sec with good July.  He was told that there was some people there that test positive for COVID.  Started on July 16th he was feeling different which he started noticing a cough and a cough now is been persistent.  Initial occasions there was wheezing.  Does no shortness of breath.  Initially there was some degree of nasal congestion but this is not the case at present.  Reports no fever chills.    Prior to his appointment he had chest x-ray which was normal.  Reason for this is in the past he has had lease once he a cough that would linger for while and 1 time was diagnosed with pneumonia    The car at times when thinking about it was noted that the fast or over 80    Also has noted that from years 2016 and 2021, 1 Ca received a course of prednisone    Medical history  Obstructive sleep apnea which he will use the CPAP sporadically depending 1 weight management    Examination  Weight 48  Pulse 64  Blood pressure 138 88Tympanic membranes normal  Nasal mucosa is clear  Oropharynx no abnormal findings  Lungs clear breath sounds good effort no wheezes or rales.  At 1 point with expiration that was a heavy cough  Chest regular rate rhythm  Abdominal exam is bowel sounds nontender      Impression cough idea reactive airway disease  Probable LPR    Plan recommend prednisone over 6 days.  Use of Allegra or Zyrtec once a day which during that time he can also take Prilosec or Pepcid.  Reflux precautions discussed

## 2022-10-07 ENCOUNTER — IMMUNIZATION (OUTPATIENT)
Dept: INTERNAL MEDICINE | Facility: CLINIC | Age: 57
End: 2022-10-07
Payer: COMMERCIAL

## 2022-10-07 DIAGNOSIS — Z23 NEED FOR VACCINATION: Primary | ICD-10-CM

## 2022-10-07 PROCEDURE — 91312 COVID-19, MRNA, LNP-S, BIVALENT BOOSTER, PF, 30 MCG/0.3 ML DOSE: CPT | Mod: S$GLB,,, | Performed by: INTERNAL MEDICINE

## 2022-10-07 PROCEDURE — 90686 IIV4 VACC NO PRSV 0.5 ML IM: CPT | Mod: S$GLB,,, | Performed by: INTERNAL MEDICINE

## 2022-10-07 PROCEDURE — 90471 IMMUNIZATION ADMIN: CPT | Mod: S$GLB,,, | Performed by: INTERNAL MEDICINE

## 2022-10-07 PROCEDURE — 91312 COVID-19, MRNA, LNP-S, BIVALENT BOOSTER, PF, 30 MCG/0.3 ML DOSE: ICD-10-PCS | Mod: S$GLB,,, | Performed by: INTERNAL MEDICINE

## 2022-10-07 PROCEDURE — 0124A PR IMMUNIZ ADMIN, SARS-COV- 2 COVID-19 VACCINE, 30MCG/0.3ML, BIVALENT, BOOSTER DOSE: ICD-10-PCS | Mod: S$GLB,,, | Performed by: INTERNAL MEDICINE

## 2022-10-07 PROCEDURE — 0124A PR IMMUNIZ ADMIN, SARS-COV- 2 COVID-19 VACCINE, 30MCG/0.3ML, BIVALENT, BOOSTER DOSE: CPT | Mod: S$GLB,,, | Performed by: INTERNAL MEDICINE

## 2022-10-07 PROCEDURE — 90686 FLU VACCINE (QUAD) GREATER THAN OR EQUAL TO 3YO PRESERVATIVE FREE IM: ICD-10-PCS | Mod: S$GLB,,, | Performed by: INTERNAL MEDICINE

## 2022-10-07 PROCEDURE — 90471 FLU VACCINE (QUAD) GREATER THAN OR EQUAL TO 3YO PRESERVATIVE FREE IM: ICD-10-PCS | Mod: S$GLB,,, | Performed by: INTERNAL MEDICINE

## 2022-10-07 PROCEDURE — 0124A COVID-19, MRNA, LNP-S, BIVALENT BOOSTER, PF, 30 MCG/0.3 ML DOSE: CPT | Mod: CV19,PBBFAC | Performed by: INTERNAL MEDICINE

## 2023-01-24 ENCOUNTER — PATIENT MESSAGE (OUTPATIENT)
Dept: INTERNAL MEDICINE | Facility: CLINIC | Age: 58
End: 2023-01-24
Payer: COMMERCIAL

## 2023-01-24 DIAGNOSIS — G89.29 CHRONIC TOE PAIN, LEFT FOOT: Primary | ICD-10-CM

## 2023-01-24 DIAGNOSIS — M79.675 CHRONIC TOE PAIN, LEFT FOOT: Primary | ICD-10-CM

## 2023-01-26 ENCOUNTER — HOSPITAL ENCOUNTER (OUTPATIENT)
Dept: RADIOLOGY | Facility: OTHER | Age: 58
Discharge: HOME | End: 2023-01-26
Attending: INTERNAL MEDICINE
Payer: COMMERCIAL

## 2023-01-26 DIAGNOSIS — M79.675 CHRONIC TOE PAIN, LEFT FOOT: ICD-10-CM

## 2023-01-26 DIAGNOSIS — G89.29 CHRONIC TOE PAIN, LEFT FOOT: ICD-10-CM

## 2023-01-26 PROCEDURE — 73620 X-RAY EXAM OF FOOT: CPT | Mod: 26,LT,, | Performed by: RADIOLOGY

## 2023-01-26 PROCEDURE — 73660 X-RAY EXAM OF TOE(S): CPT | Mod: TC,FY,LT

## 2023-01-26 PROCEDURE — 73660 XR TOE 2 OR MORE VIEWS LEFT: ICD-10-PCS | Mod: 26,LT,, | Performed by: RADIOLOGY

## 2023-01-26 PROCEDURE — 73660 X-RAY EXAM OF TOE(S): CPT | Mod: 26,LT,, | Performed by: RADIOLOGY

## 2023-01-26 PROCEDURE — 73620 X-RAY EXAM OF FOOT: CPT | Mod: TC,FY,LT

## 2023-01-26 PROCEDURE — 73620 XR FOOT 2 VIEW LEFT: ICD-10-PCS | Mod: 26,LT,, | Performed by: RADIOLOGY

## 2023-03-08 ENCOUNTER — PATIENT MESSAGE (OUTPATIENT)
Dept: INTERNAL MEDICINE | Facility: CLINIC | Age: 58
End: 2023-03-08
Payer: COMMERCIAL

## 2023-03-08 DIAGNOSIS — M79.672 CHRONIC FOOT PAIN, LEFT: Primary | ICD-10-CM

## 2023-03-08 DIAGNOSIS — G89.29 CHRONIC FOOT PAIN, LEFT: Primary | ICD-10-CM

## 2023-03-14 ENCOUNTER — OFFICE VISIT (OUTPATIENT)
Dept: ORTHOPEDICS | Facility: CLINIC | Age: 58
End: 2023-03-14
Payer: COMMERCIAL

## 2023-03-14 DIAGNOSIS — Q66.89 CURLY TOE: ICD-10-CM

## 2023-03-14 DIAGNOSIS — L60.3 DYSTROPHIC NAIL: Primary | ICD-10-CM

## 2023-03-14 PROCEDURE — 99999 PR PBB SHADOW E&M-EST. PATIENT-LVL III: CPT | Mod: PBBFAC,,, | Performed by: ORTHOPAEDIC SURGERY

## 2023-03-14 PROCEDURE — 1159F PR MEDICATION LIST DOCUMENTED IN MEDICAL RECORD: ICD-10-PCS | Mod: CPTII,S$GLB,, | Performed by: ORTHOPAEDIC SURGERY

## 2023-03-14 PROCEDURE — 1160F RVW MEDS BY RX/DR IN RCRD: CPT | Mod: CPTII,S$GLB,, | Performed by: ORTHOPAEDIC SURGERY

## 2023-03-14 PROCEDURE — 1159F MED LIST DOCD IN RCRD: CPT | Mod: CPTII,S$GLB,, | Performed by: ORTHOPAEDIC SURGERY

## 2023-03-14 PROCEDURE — 99202 PR OFFICE/OUTPT VISIT, NEW, LEVL II, 15-29 MIN: ICD-10-PCS | Mod: S$GLB,,, | Performed by: ORTHOPAEDIC SURGERY

## 2023-03-14 PROCEDURE — 99999 PR PBB SHADOW E&M-EST. PATIENT-LVL III: ICD-10-PCS | Mod: PBBFAC,,, | Performed by: ORTHOPAEDIC SURGERY

## 2023-03-14 PROCEDURE — 1160F PR REVIEW ALL MEDS BY PRESCRIBER/CLIN PHARMACIST DOCUMENTED: ICD-10-PCS | Mod: CPTII,S$GLB,, | Performed by: ORTHOPAEDIC SURGERY

## 2023-03-14 PROCEDURE — 99202 OFFICE O/P NEW SF 15 MIN: CPT | Mod: S$GLB,,, | Performed by: ORTHOPAEDIC SURGERY

## 2023-03-14 NOTE — PROGRESS NOTES
Subjective:      Patient ID: Naldo Barakat is a 57 y.o. male.    Chief Complaint:  Left 4th toe pain    HPI:  This is a 57-year-old male who presents with a four-month history of pain and discomfort at the distal medial aspect of his left 4th toe.  He reports pain that is worse with shoe wear.  He does not report any history of injury.  He had recent x-rays performed in January which show no obvious abnormalities.  He comes in for evaluation.  He reports that the pain is not disabling but is very aggravating.    Past Medical History:   Diagnosis Date    Allergy     Eczema        Current Outpatient Medications:     fluticasone propionate (FLONASE) 50 mcg/actuation nasal spray, Use 1 spray (50 mcg total) by Each Nostril route once daily. (Patient not taking: Reported on 8/1/2022), Disp: 16 g, Rfl: 6    meloxicam (MOBIC) 15 MG tablet, Take 1 tablet (15 mg total) by mouth once daily. (Patient not taking: Reported on 9/30/2020), Disp: 30 tablet, Rfl: 2    predniSONE (DELTASONE) 20 MG tablet, 2 tablets po daily for 3 days, then 1 tablets po daily for 3 days, Disp: 9 tablet, Rfl: 0    Current Facility-Administered Medications:     acetaminophen tablet 650 mg, 650 mg, Oral, Once PRN, Tina Cordova NP    albuterol inhaler 2 puff, 2 puff, Inhalation, Q20 Min PRN, Tina Cordova NP    cefTRIAXone injection 1 g, 1 g, Intramuscular, Q24H, Anthony Wyman MD, 1 g at 06/21/19 0619    EPINEPHrine (EPIPEN) 0.3 mg/0.3 mL pen injection 0.3 mg, 0.3 mg, Intramuscular, PRN, Tina Cordova NP    ondansetron disintegrating tablet 4 mg, 4 mg, Oral, Once PRN, Tina Cordova NP    predniSONE tablet 40 mg, 40 mg, Oral, Once PRN, Tina Cordova NP  Review of patient's allergies indicates:   Allergen Reactions    Erythromycin     Macrolide antibiotics        There were no vitals taken for this visit.    ROS:  Negative for chest pain, shortness of breath, fevers, or unexplained weight loss.      Objective:    Ortho Exam         This is a well-developed well-nourished male who walks in with a normal gait.  On standing inspection he has plantigrade alignment of his feet.  He has some curling of his lesser toes and the 4th toe on the left appears to curl underneath the 3rd toe.  Inspection of the nail reveals some hyperkeratotic skin on the medial aspect of the nail skin fold which is tender.  There is no sign of any infection.  His toe deformity is passively correctable.      Assessment:     Imaging:  I reviewed the x-rays of his foot and toes that were done on 01/26/2023 which reveal no obvious structural abnormalities.        1. Dystrophic nail left 4th toe        2. Curly toe, left 4th toe  Ambulatory referral/consult to Orthopedics              Plan:          Recommendation:  I suspect he is having some abnormal pressure on the end of the 4th toe from impingement on the 3rd toe.  I think this problem could be best addressed in Podiatry so I will make a referral for evaluation and possible treatment in Podiatry.

## 2023-03-16 ENCOUNTER — OFFICE VISIT (OUTPATIENT)
Dept: PODIATRY | Facility: CLINIC | Age: 58
End: 2023-03-16
Payer: COMMERCIAL

## 2023-03-16 VITALS
BODY MASS INDEX: 34.96 KG/M2 | WEIGHT: 249.69 LBS | HEART RATE: 67 BPM | SYSTOLIC BLOOD PRESSURE: 166 MMHG | DIASTOLIC BLOOD PRESSURE: 104 MMHG | HEIGHT: 71 IN

## 2023-03-16 DIAGNOSIS — Q66.89 CURLY TOE: ICD-10-CM

## 2023-03-16 DIAGNOSIS — L60.3 DYSTROPHIC NAIL: ICD-10-CM

## 2023-03-16 PROCEDURE — 3077F SYST BP >= 140 MM HG: CPT | Mod: CPTII,S$GLB,, | Performed by: PODIATRIST

## 2023-03-16 PROCEDURE — 3077F PR MOST RECENT SYSTOLIC BLOOD PRESSURE >= 140 MM HG: ICD-10-PCS | Mod: CPTII,S$GLB,, | Performed by: PODIATRIST

## 2023-03-16 PROCEDURE — 3008F PR BODY MASS INDEX (BMI) DOCUMENTED: ICD-10-PCS | Mod: CPTII,S$GLB,, | Performed by: PODIATRIST

## 2023-03-16 PROCEDURE — 99999 PR PBB SHADOW E&M-EST. PATIENT-LVL III: ICD-10-PCS | Mod: PBBFAC,,, | Performed by: PODIATRIST

## 2023-03-16 PROCEDURE — 99999 PR PBB SHADOW E&M-EST. PATIENT-LVL III: CPT | Mod: PBBFAC,,, | Performed by: PODIATRIST

## 2023-03-16 PROCEDURE — 99203 PR OFFICE/OUTPT VISIT, NEW, LEVL III, 30-44 MIN: ICD-10-PCS | Mod: S$GLB,,, | Performed by: PODIATRIST

## 2023-03-16 PROCEDURE — 3008F BODY MASS INDEX DOCD: CPT | Mod: CPTII,S$GLB,, | Performed by: PODIATRIST

## 2023-03-16 PROCEDURE — 3080F DIAST BP >= 90 MM HG: CPT | Mod: CPTII,S$GLB,, | Performed by: PODIATRIST

## 2023-03-16 PROCEDURE — 99203 OFFICE O/P NEW LOW 30 MIN: CPT | Mod: S$GLB,,, | Performed by: PODIATRIST

## 2023-03-16 PROCEDURE — 3080F PR MOST RECENT DIASTOLIC BLOOD PRESSURE >= 90 MM HG: ICD-10-PCS | Mod: CPTII,S$GLB,, | Performed by: PODIATRIST

## 2023-03-16 RX ORDER — TERBINAFINE HYDROCHLORIDE 250 MG/1
250 TABLET ORAL DAILY
Qty: 90 TABLET | Refills: 0 | Status: SHIPPED | OUTPATIENT
Start: 2023-03-16 | End: 2023-06-21

## 2023-03-16 RX ORDER — KETOCONAZOLE 20 MG/G
CREAM TOPICAL DAILY
Qty: 60 G | Refills: 2 | Status: ON HOLD | OUTPATIENT
Start: 2023-03-16 | End: 2023-08-18 | Stop reason: HOSPADM

## 2023-03-20 ENCOUNTER — OFFICE VISIT (OUTPATIENT)
Dept: INTERNAL MEDICINE | Facility: CLINIC | Age: 58
End: 2023-03-20
Payer: COMMERCIAL

## 2023-03-20 VITALS
BODY MASS INDEX: 34.78 KG/M2 | OXYGEN SATURATION: 97 % | HEART RATE: 84 BPM | DIASTOLIC BLOOD PRESSURE: 80 MMHG | WEIGHT: 248.44 LBS | HEIGHT: 71 IN | SYSTOLIC BLOOD PRESSURE: 136 MMHG

## 2023-03-20 DIAGNOSIS — J00 ACUTE RHINITIS: ICD-10-CM

## 2023-03-20 DIAGNOSIS — H66.92 LEFT OTITIS MEDIA, UNSPECIFIED OTITIS MEDIA TYPE: ICD-10-CM

## 2023-03-20 DIAGNOSIS — J45.909 ACUTE ASTHMATIC BRONCHITIS: Primary | ICD-10-CM

## 2023-03-20 PROCEDURE — 3079F DIAST BP 80-89 MM HG: CPT | Mod: CPTII,S$GLB,, | Performed by: INTERNAL MEDICINE

## 2023-03-20 PROCEDURE — 99214 PR OFFICE/OUTPT VISIT, EST, LEVL IV, 30-39 MIN: ICD-10-PCS | Mod: 25,S$GLB,, | Performed by: INTERNAL MEDICINE

## 2023-03-20 PROCEDURE — 1159F MED LIST DOCD IN RCRD: CPT | Mod: CPTII,S$GLB,, | Performed by: INTERNAL MEDICINE

## 2023-03-20 PROCEDURE — 99999 PR PBB SHADOW E&M-EST. PATIENT-LVL III: ICD-10-PCS | Mod: PBBFAC,,, | Performed by: INTERNAL MEDICINE

## 2023-03-20 PROCEDURE — 1160F RVW MEDS BY RX/DR IN RCRD: CPT | Mod: CPTII,S$GLB,, | Performed by: INTERNAL MEDICINE

## 2023-03-20 PROCEDURE — 1159F PR MEDICATION LIST DOCUMENTED IN MEDICAL RECORD: ICD-10-PCS | Mod: CPTII,S$GLB,, | Performed by: INTERNAL MEDICINE

## 2023-03-20 PROCEDURE — 3008F BODY MASS INDEX DOCD: CPT | Mod: CPTII,S$GLB,, | Performed by: INTERNAL MEDICINE

## 2023-03-20 PROCEDURE — 96372 PR INJECTION,THERAP/PROPH/DIAG2ST, IM OR SUBCUT: ICD-10-PCS | Mod: S$GLB,,, | Performed by: INTERNAL MEDICINE

## 2023-03-20 PROCEDURE — 99999 PR PBB SHADOW E&M-EST. PATIENT-LVL III: CPT | Mod: PBBFAC,,, | Performed by: INTERNAL MEDICINE

## 2023-03-20 PROCEDURE — 1160F PR REVIEW ALL MEDS BY PRESCRIBER/CLIN PHARMACIST DOCUMENTED: ICD-10-PCS | Mod: CPTII,S$GLB,, | Performed by: INTERNAL MEDICINE

## 2023-03-20 PROCEDURE — 3079F PR MOST RECENT DIASTOLIC BLOOD PRESSURE 80-89 MM HG: ICD-10-PCS | Mod: CPTII,S$GLB,, | Performed by: INTERNAL MEDICINE

## 2023-03-20 PROCEDURE — 99214 OFFICE O/P EST MOD 30 MIN: CPT | Mod: 25,S$GLB,, | Performed by: INTERNAL MEDICINE

## 2023-03-20 PROCEDURE — 96372 THER/PROPH/DIAG INJ SC/IM: CPT | Mod: S$GLB,,, | Performed by: INTERNAL MEDICINE

## 2023-03-20 PROCEDURE — 3008F PR BODY MASS INDEX (BMI) DOCUMENTED: ICD-10-PCS | Mod: CPTII,S$GLB,, | Performed by: INTERNAL MEDICINE

## 2023-03-20 PROCEDURE — 3075F PR MOST RECENT SYSTOLIC BLOOD PRESS GE 130-139MM HG: ICD-10-PCS | Mod: CPTII,S$GLB,, | Performed by: INTERNAL MEDICINE

## 2023-03-20 PROCEDURE — 3075F SYST BP GE 130 - 139MM HG: CPT | Mod: CPTII,S$GLB,, | Performed by: INTERNAL MEDICINE

## 2023-03-20 RX ORDER — PROMETHAZINE HYDROCHLORIDE AND DEXTROMETHORPHAN HYDROBROMIDE 6.25; 15 MG/5ML; MG/5ML
5 SYRUP ORAL EVERY 4 HOURS PRN
Qty: 120 ML | Refills: 0 | Status: SHIPPED | OUTPATIENT
Start: 2023-03-20 | End: 2023-03-30

## 2023-03-20 RX ORDER — PROMETHAZINE HYDROCHLORIDE AND DEXTROMETHORPHAN HYDROBROMIDE 6.25; 15 MG/5ML; MG/5ML
5 SYRUP ORAL EVERY 4 HOURS PRN
Qty: 120 ML | Refills: 0 | Status: SHIPPED | OUTPATIENT
Start: 2023-03-20 | End: 2023-03-20

## 2023-03-20 RX ORDER — AMOXICILLIN 875 MG/1
875 TABLET, FILM COATED ORAL 2 TIMES DAILY
Qty: 20 TABLET | Refills: 0 | Status: SHIPPED | OUTPATIENT
Start: 2023-03-20 | End: 2023-03-20

## 2023-03-20 RX ORDER — AZELASTINE 1 MG/ML
2 SPRAY, METERED NASAL 2 TIMES DAILY
Qty: 30 ML | Refills: 0 | Status: SHIPPED | OUTPATIENT
Start: 2023-03-20 | End: 2023-03-20

## 2023-03-20 RX ORDER — TRIAMCINOLONE ACETONIDE 40 MG/ML
40 INJECTION, SUSPENSION INTRA-ARTICULAR; INTRAMUSCULAR
Status: COMPLETED | OUTPATIENT
Start: 2023-03-20 | End: 2023-03-20

## 2023-03-20 RX ORDER — AZELASTINE 1 MG/ML
2 SPRAY, METERED NASAL 2 TIMES DAILY
Qty: 30 ML | Refills: 0 | Status: SHIPPED | OUTPATIENT
Start: 2023-03-20 | End: 2023-04-11

## 2023-03-20 RX ORDER — AMOXICILLIN 875 MG/1
875 TABLET, FILM COATED ORAL 2 TIMES DAILY
Qty: 20 TABLET | Refills: 0 | Status: ON HOLD | OUTPATIENT
Start: 2023-03-20 | End: 2023-08-18 | Stop reason: HOSPADM

## 2023-03-20 RX ADMIN — TRIAMCINOLONE ACETONIDE 40 MG: 40 INJECTION, SUSPENSION INTRA-ARTICULAR; INTRAMUSCULAR at 03:03

## 2023-03-20 NOTE — PROGRESS NOTES
57-year-old male  Presents for persistent cough for the past few weeks.  He was out of town for the moderate holidays.  Got back on Friday March 3rd which at that time he was having a feverish feeling body aches and nasal congestion and cough.  Actually got a little bit better but then for the past week he has been having a persistent cough it tends to be nonproductive but today was cloudy mucus.  There is no shortness a breath or wheezing.  He is also noticing in his left ear hurting.    He is had similar situations in the past where he is had a persistent cough associated with upper respiratory infection.  Diagnosed with right lower lobe pneumonitis 2019      Examination   Weight 248 lb   Pulse 84   Blood pressure 136/80  Pulse ox 97%   Right tympanic membranes normal   Left tympanic membranes hyperemic  Nasal mucosa swollen congested with cloudy mucus  Oropharynx no abnormal findings   Neck no adenopathy  Chest clear breath sounds with inspiration coarse rhonchi and wheezing lower lung fields with forced expiration    Impression  Asthmatic bronchitis   Otitis media  Acute rhinitis    Plan   Kenalog 40 mg IM  Amoxicillin 875 mg twice a day for 10 days   Promethazine DM as needed but can use Mucinex to use as needed Astelin nasal spray

## 2023-03-23 NOTE — PROGRESS NOTES
Subjective:      Patient ID: Naldo Barakat is a 57 y.o. male.    Chief Complaint:   Nail Problem and Ingrown Toenail (4th toe nail issue)    Naldo is a 57 y.o. male who presents to the clinic complaining of thick and discolored toenails on both feet. Naldo is inquiring about treatment options.    Review of Systems   Constitutional: Negative for chills, decreased appetite, fever and malaise/fatigue.   HENT:  Negative for congestion, hearing loss, nosebleeds and tinnitus.    Eyes:  Negative for double vision, pain, photophobia and visual disturbance.   Cardiovascular:  Negative for chest pain, claudication, cyanosis and leg swelling.   Respiratory:  Negative for cough, hemoptysis, shortness of breath and wheezing.    Endocrine: Negative for cold intolerance and heat intolerance.   Hematologic/Lymphatic: Negative for adenopathy and bleeding problem.   Skin:  Positive for nail changes. Negative for dry skin, itching and suspicious lesions.   Musculoskeletal:  Negative for arthritis, joint pain, myalgias and stiffness.   Gastrointestinal:  Negative for abdominal pain, jaundice, nausea and vomiting.   Genitourinary:  Negative for dysuria, frequency and hematuria.   Neurological:  Negative for difficulty with concentration, loss of balance, numbness, paresthesias and sensory change.   Psychiatric/Behavioral:  Negative for altered mental status, hallucinations and suicidal ideas. The patient is not nervous/anxious.    Allergic/Immunologic: Negative for environmental allergies and persistent infections.         Objective:      Physical Exam  Constitutional:       Appearance: He is well-developed.   HENT:      Head: Normocephalic and atraumatic.   Cardiovascular:      Pulses:           Dorsalis pedis pulses are 2+ on the right side and 2+ on the left side.        Posterior tibial pulses are 2+ on the right side and 2+ on the left side.   Pulmonary:      Effort: Pulmonary effort is normal.   Musculoskeletal:      Right foot:  Normal range of motion. No deformity.      Left foot: Normal range of motion. No deformity.      Comments: Inspection and palpation of the muscles joints and bones of both lower extremities reveal that muscle strength for the anterior, lateral, and posterior muscle groups and intrinsic muscle groups of the foot are all 5 over 5 symmetrical. Ankle, subtalar, midtarsal, and digital joint range of motion  are within normal limits, nonpainful, without crepitus or effusion. Patient exhibits a normal angle and base of gait. Palpation of the tendons reveal no defects.     Feet:      Right foot:      Skin integrity: No skin breakdown or erythema.      Left foot:      Skin integrity: No skin breakdown or erythema.   Skin:     General: Skin is warm and dry.      Nails: There is no clubbing.      Comments: Skin turgor is normal bilaterally. Skin texture is well hydrated to both lower extremities. No lesions or rashes or wounds appreciated bilaterally.   Thickened, discolored  toenails 1-5 with subungual debris     Neurological:      Mental Status: He is alert and oriented to person, place, and time.      Deep Tendon Reflexes:      Reflex Scores:       Patellar reflexes are 2+ on the right side and 2+ on the left side.       Achilles reflexes are 2+ on the right side and 2+ on the left side.     Comments: Sharp, dull, light touch, vibratory, and proprioceptive sensation are intact bilaterally. Deep tendon reflexes to patellar and Achilles tendon are symmetrical, 2/4 bilaterally. No ankle clonus or Babinski reflexes noted bilaterally. Coordination is normal to both feet and lower extremities.   Psychiatric:         Behavior: Behavior normal.           Assessment:       Encounter Diagnoses   Name Primary?    Curly toe, left 4th toe     Dystrophic nail left 4th toe      Independent visualization of imaging was performed.  Results were reviewed in detail with patient.       Plan:       Naldo was seen today for nail problem and  ingrown toenail.    Diagnoses and all orders for this visit:    Curly toe, left 4th toe  -     Ambulatory referral/consult to Podiatry    Dystrophic nail left 4th toe  -     Ambulatory referral/consult to Podiatry    Other orders  -     ketoconazole (NIZORAL) 2 % cream; Apply topically once daily.  -     terbinafine HCL (LAMISIL) 250 mg tablet; Take 1 tablet (250 mg total) by mouth once daily.      I counseled the patient on his conditions, their implications and medical management.    We discussed using Lamisil for onychomycosis. This drug offers a fairly high but not universal cure rate. A 12 week treatment course is recommended. The patient is aware that rare cases of liver injury have been reported; and agrees to come in for liver function tests at 6 weeks of treatment. The symptoms of liver disease have been discussed; call if such occurs. In addition, some insurance plans do not cover the expense of this drug for treating a cosmetic condition, and the patient understands they may have to pay for the medication. Other side effects, such as headaches and rashes, have also been discussed.

## 2023-04-05 ENCOUNTER — PATIENT MESSAGE (OUTPATIENT)
Dept: INTERNAL MEDICINE | Facility: CLINIC | Age: 58
End: 2023-04-05
Payer: COMMERCIAL

## 2023-04-06 ENCOUNTER — OFFICE VISIT (OUTPATIENT)
Dept: INTERNAL MEDICINE | Facility: CLINIC | Age: 58
End: 2023-04-06
Payer: COMMERCIAL

## 2023-04-06 VITALS
OXYGEN SATURATION: 97 % | SYSTOLIC BLOOD PRESSURE: 130 MMHG | HEART RATE: 64 BPM | DIASTOLIC BLOOD PRESSURE: 80 MMHG | HEIGHT: 71 IN | WEIGHT: 248.69 LBS | BODY MASS INDEX: 34.81 KG/M2

## 2023-04-06 DIAGNOSIS — R05.9 COUGH, UNSPECIFIED TYPE: Primary | ICD-10-CM

## 2023-04-06 DIAGNOSIS — J45.909 REACTIVE AIRWAY DISEASE WITHOUT COMPLICATION, UNSPECIFIED ASTHMA SEVERITY, UNSPECIFIED WHETHER PERSISTENT: ICD-10-CM

## 2023-04-06 PROCEDURE — 3075F SYST BP GE 130 - 139MM HG: CPT | Mod: CPTII,S$GLB,, | Performed by: INTERNAL MEDICINE

## 2023-04-06 PROCEDURE — 3008F BODY MASS INDEX DOCD: CPT | Mod: CPTII,S$GLB,, | Performed by: INTERNAL MEDICINE

## 2023-04-06 PROCEDURE — 1159F PR MEDICATION LIST DOCUMENTED IN MEDICAL RECORD: ICD-10-PCS | Mod: CPTII,S$GLB,, | Performed by: INTERNAL MEDICINE

## 2023-04-06 PROCEDURE — 99999 PR PBB SHADOW E&M-EST. PATIENT-LVL III: ICD-10-PCS | Mod: PBBFAC,,, | Performed by: INTERNAL MEDICINE

## 2023-04-06 PROCEDURE — 1160F PR REVIEW ALL MEDS BY PRESCRIBER/CLIN PHARMACIST DOCUMENTED: ICD-10-PCS | Mod: CPTII,S$GLB,, | Performed by: INTERNAL MEDICINE

## 2023-04-06 PROCEDURE — 3079F DIAST BP 80-89 MM HG: CPT | Mod: CPTII,S$GLB,, | Performed by: INTERNAL MEDICINE

## 2023-04-06 PROCEDURE — 3008F PR BODY MASS INDEX (BMI) DOCUMENTED: ICD-10-PCS | Mod: CPTII,S$GLB,, | Performed by: INTERNAL MEDICINE

## 2023-04-06 PROCEDURE — 99214 OFFICE O/P EST MOD 30 MIN: CPT | Mod: S$GLB,,, | Performed by: INTERNAL MEDICINE

## 2023-04-06 PROCEDURE — 99999 PR PBB SHADOW E&M-EST. PATIENT-LVL III: CPT | Mod: PBBFAC,,, | Performed by: INTERNAL MEDICINE

## 2023-04-06 PROCEDURE — 1159F MED LIST DOCD IN RCRD: CPT | Mod: CPTII,S$GLB,, | Performed by: INTERNAL MEDICINE

## 2023-04-06 PROCEDURE — 3075F PR MOST RECENT SYSTOLIC BLOOD PRESS GE 130-139MM HG: ICD-10-PCS | Mod: CPTII,S$GLB,, | Performed by: INTERNAL MEDICINE

## 2023-04-06 PROCEDURE — 3079F PR MOST RECENT DIASTOLIC BLOOD PRESSURE 80-89 MM HG: ICD-10-PCS | Mod: CPTII,S$GLB,, | Performed by: INTERNAL MEDICINE

## 2023-04-06 PROCEDURE — 99214 PR OFFICE/OUTPT VISIT, EST, LEVL IV, 30-39 MIN: ICD-10-PCS | Mod: S$GLB,,, | Performed by: INTERNAL MEDICINE

## 2023-04-06 PROCEDURE — 1160F RVW MEDS BY RX/DR IN RCRD: CPT | Mod: CPTII,S$GLB,, | Performed by: INTERNAL MEDICINE

## 2023-04-06 RX ORDER — PREDNISONE 20 MG/1
TABLET ORAL
Qty: 18 TABLET | Refills: 0 | Status: ON HOLD | OUTPATIENT
Start: 2023-04-06 | End: 2023-08-18 | Stop reason: HOSPADM

## 2023-04-06 RX ORDER — CODEINE PHOSPHATE AND GUAIFENESIN 10; 100 MG/5ML; MG/5ML
5 SOLUTION ORAL EVERY 4 HOURS PRN
Qty: 240 ML | Refills: 0 | Status: SHIPPED | OUTPATIENT
Start: 2023-04-06 | End: 2024-03-25 | Stop reason: SDUPTHER

## 2023-04-06 RX ORDER — FAMOTIDINE 20 MG/1
20 TABLET, FILM COATED ORAL NIGHTLY PRN
Qty: 10 TABLET | Refills: 11 | Status: SHIPPED | OUTPATIENT
Start: 2023-04-06 | End: 2023-12-19

## 2023-04-06 NOTE — PROGRESS NOTES
57-year-old male      Presents with a persistent cough.    Patient was seen on March 20th for asthmatic bronchitis.  Kenalog 40 mg in the amoxicillin was sent in along with promethazine DM     He still presents with a persistent cough is mainly dry but sounds wet.  He is not short of breath.  No wheezing or fever.  Cough can at times be more noted   When lying down and at times it is probably initially goes asleep.  Also when taking a deep breath.      Last year year before after having a viral upper respiratory infection he is had a persistent   Cough  afterwards.  He does not suffer with heartburn indigestion on consistent basis but he has noted at times after eating he could have the symptoms but is not chronic     Examination   Weight 248   Pulse 64   Blood pressure  128/76   Tympanic membranes normal   Nasal mucosa is clear  Oropharynx no abnormal findings   Neck no adenopathy  Chest clear breath sounds with inspiration and no wheezing with forced expiration but the times it induced a cough     Impression   Cough prior related reactive airway disease     Plan   Prednisone taper over the next  9 days  Pepcid 20 mg q.h.s. for the next 10 days   Guaifenesin with codeine was prescribed 1 tsp every 4 6 hours

## 2023-04-24 ENCOUNTER — PATIENT MESSAGE (OUTPATIENT)
Dept: INTERNAL MEDICINE | Facility: CLINIC | Age: 58
End: 2023-04-24
Payer: COMMERCIAL

## 2023-04-26 ENCOUNTER — IMMUNIZATION (OUTPATIENT)
Dept: INTERNAL MEDICINE | Facility: CLINIC | Age: 58
End: 2023-04-26
Payer: COMMERCIAL

## 2023-04-26 DIAGNOSIS — Z23 NEED FOR VACCINATION: Primary | ICD-10-CM

## 2023-04-26 PROCEDURE — 0124A COVID-19, MRNA, LNP-S, BIVALENT BOOSTER, PF, 30 MCG/0.3 ML DOSE: CPT | Mod: CV19,PBBFAC | Performed by: INTERNAL MEDICINE

## 2023-04-26 PROCEDURE — 91312 COVID-19, MRNA, LNP-S, BIVALENT BOOSTER, PF, 30 MCG/0.3 ML DOSE: ICD-10-PCS | Mod: S$GLB,,, | Performed by: INTERNAL MEDICINE

## 2023-04-26 PROCEDURE — 91312 COVID-19, MRNA, LNP-S, BIVALENT BOOSTER, PF, 30 MCG/0.3 ML DOSE: CPT | Mod: S$GLB,,, | Performed by: INTERNAL MEDICINE

## 2023-07-06 ENCOUNTER — PATIENT MESSAGE (OUTPATIENT)
Dept: INTERNAL MEDICINE | Facility: CLINIC | Age: 58
End: 2023-07-06
Payer: COMMERCIAL

## 2023-08-16 ENCOUNTER — HOSPITAL ENCOUNTER (INPATIENT)
Facility: HOSPITAL | Age: 58
LOS: 1 days | Discharge: HOME | DRG: 282 | End: 2023-08-18
Attending: EMERGENCY MEDICINE | Admitting: EMERGENCY MEDICINE
Payer: COMMERCIAL

## 2023-08-16 DIAGNOSIS — I21.4 NSTEMI (NON-ST ELEVATION MYOCARDIAL INFARCTION): ICD-10-CM

## 2023-08-16 DIAGNOSIS — I21.4 NON-ST ELEVATION MYOCARDIAL INFARCTION (NSTEMI): ICD-10-CM

## 2023-08-16 DIAGNOSIS — R07.9 CHEST PAIN: ICD-10-CM

## 2023-08-16 DIAGNOSIS — I25.10 CAD (CORONARY ARTERY DISEASE): ICD-10-CM

## 2023-08-16 DIAGNOSIS — I21.4 NSTEMI (NON-ST ELEVATED MYOCARDIAL INFARCTION): Primary | ICD-10-CM

## 2023-08-16 LAB
ALBUMIN SERPL BCP-MCNC: 4.1 G/DL (ref 3.5–5.2)
ALP SERPL-CCNC: 68 U/L (ref 55–135)
ALT SERPL W/O P-5'-P-CCNC: 32 U/L (ref 10–44)
ANION GAP SERPL CALC-SCNC: 14 MMOL/L (ref 8–16)
AST SERPL-CCNC: 28 U/L (ref 10–40)
BASOPHILS # BLD AUTO: 0.04 K/UL (ref 0–0.2)
BASOPHILS NFR BLD: 0.5 % (ref 0–1.9)
BILIRUB SERPL-MCNC: 0.4 MG/DL (ref 0.1–1)
BUN SERPL-MCNC: 18 MG/DL (ref 6–20)
CALCIUM SERPL-MCNC: 9.1 MG/DL (ref 8.7–10.5)
CHLORIDE SERPL-SCNC: 108 MMOL/L (ref 95–110)
CO2 SERPL-SCNC: 22 MMOL/L (ref 23–29)
CREAT SERPL-MCNC: 1.3 MG/DL (ref 0.5–1.4)
DIFFERENTIAL METHOD: ABNORMAL
EOSINOPHIL # BLD AUTO: 0.1 K/UL (ref 0–0.5)
EOSINOPHIL NFR BLD: 0.9 % (ref 0–8)
ERYTHROCYTE [DISTWIDTH] IN BLOOD BY AUTOMATED COUNT: 12.5 % (ref 11.5–14.5)
EST. GFR  (NO RACE VARIABLE): >60 ML/MIN/1.73 M^2
GLUCOSE SERPL-MCNC: 108 MG/DL (ref 70–110)
HCT VFR BLD AUTO: 42.5 % (ref 40–54)
HGB BLD-MCNC: 14.5 G/DL (ref 14–18)
IMM GRANULOCYTES # BLD AUTO: 0.03 K/UL (ref 0–0.04)
IMM GRANULOCYTES NFR BLD AUTO: 0.4 % (ref 0–0.5)
LYMPHOCYTES # BLD AUTO: 1.7 K/UL (ref 1–4.8)
LYMPHOCYTES NFR BLD: 21.7 % (ref 18–48)
MCH RBC QN AUTO: 33.1 PG (ref 27–31)
MCHC RBC AUTO-ENTMCNC: 34.1 G/DL (ref 32–36)
MCV RBC AUTO: 97 FL (ref 82–98)
MONOCYTES # BLD AUTO: 0.5 K/UL (ref 0.3–1)
MONOCYTES NFR BLD: 6.8 % (ref 4–15)
NEUTROPHILS # BLD AUTO: 5.5 K/UL (ref 1.8–7.7)
NEUTROPHILS NFR BLD: 69.7 % (ref 38–73)
NRBC BLD-RTO: 0 /100 WBC
PLATELET # BLD AUTO: 251 K/UL (ref 150–450)
PMV BLD AUTO: 9.6 FL (ref 9.2–12.9)
POTASSIUM SERPL-SCNC: 4.2 MMOL/L (ref 3.5–5.1)
PROT SERPL-MCNC: 7.3 G/DL (ref 6–8.4)
RBC # BLD AUTO: 4.38 M/UL (ref 4.6–6.2)
SODIUM SERPL-SCNC: 144 MMOL/L (ref 136–145)
TROPONIN I SERPL DL<=0.01 NG/ML-MCNC: 0.07 NG/ML (ref 0–0.03)
WBC # BLD AUTO: 7.94 K/UL (ref 3.9–12.7)

## 2023-08-16 PROCEDURE — 25000242 PHARM REV CODE 250 ALT 637 W/ HCPCS: Performed by: EMERGENCY MEDICINE

## 2023-08-16 PROCEDURE — 83880 ASSAY OF NATRIURETIC PEPTIDE: CPT | Performed by: EMERGENCY MEDICINE

## 2023-08-16 PROCEDURE — 99291 CRITICAL CARE FIRST HOUR: CPT

## 2023-08-16 PROCEDURE — 93010 EKG 12-LEAD: ICD-10-PCS | Mod: ,,, | Performed by: INTERNAL MEDICINE

## 2023-08-16 PROCEDURE — 93010 ELECTROCARDIOGRAM REPORT: CPT | Mod: ,,, | Performed by: INTERNAL MEDICINE

## 2023-08-16 PROCEDURE — 80053 COMPREHEN METABOLIC PANEL: CPT | Performed by: EMERGENCY MEDICINE

## 2023-08-16 PROCEDURE — 85025 COMPLETE CBC W/AUTO DIFF WBC: CPT | Performed by: EMERGENCY MEDICINE

## 2023-08-16 PROCEDURE — 84484 ASSAY OF TROPONIN QUANT: CPT | Performed by: EMERGENCY MEDICINE

## 2023-08-16 PROCEDURE — 93005 ELECTROCARDIOGRAM TRACING: CPT

## 2023-08-16 RX ORDER — NITROGLYCERIN 0.4 MG/1
0.4 TABLET SUBLINGUAL
Status: COMPLETED | OUTPATIENT
Start: 2023-08-16 | End: 2023-08-16

## 2023-08-16 RX ORDER — NITROGLYCERIN 0.4 MG/1
TABLET SUBLINGUAL
Status: DISPENSED
Start: 2023-08-16 | End: 2023-08-17

## 2023-08-16 RX ADMIN — NITROGLYCERIN 0.4 MG: 0.4 TABLET, ORALLY DISINTEGRATING SUBLINGUAL at 11:08

## 2023-08-17 PROBLEM — I16.0 HYPERTENSIVE URGENCY: Status: ACTIVE | Noted: 2023-08-17

## 2023-08-17 PROBLEM — I21.4 NSTEMI (NON-ST ELEVATED MYOCARDIAL INFARCTION): Status: ACTIVE | Noted: 2023-08-17

## 2023-08-17 LAB
ABO + RH BLD: NORMAL
ANION GAP SERPL CALC-SCNC: 9 MMOL/L (ref 8–16)
APTT PPP: 24.2 SEC (ref 21–32)
APTT PPP: 31.9 SEC (ref 21–32)
APTT PPP: 31.9 SEC (ref 21–32)
APTT PPP: 35.1 SEC (ref 21–32)
ASCENDING AORTA: 3.9 CM
AV INDEX (PROSTH): 0.72
AV MEAN GRADIENT: 4 MMHG
AV PEAK GRADIENT: 8 MMHG
AV VALVE AREA BY VELOCITY RATIO: 2.76 CM²
AV VALVE AREA: 2.67 CM²
AV VELOCITY RATIO: 0.74
BASOPHILS # BLD AUTO: 0.05 K/UL (ref 0–0.2)
BASOPHILS # BLD AUTO: 0.05 K/UL (ref 0–0.2)
BASOPHILS NFR BLD: 0.7 % (ref 0–1.9)
BASOPHILS NFR BLD: 0.7 % (ref 0–1.9)
BLD GP AB SCN CELLS X3 SERPL QL: NORMAL
BNP SERPL-MCNC: 23 PG/ML (ref 0–99)
BSA FOR ECHO PROCEDURE: 2.4 M2
BUN SERPL-MCNC: 15 MG/DL (ref 6–20)
CALCIUM SERPL-MCNC: 8.5 MG/DL (ref 8.7–10.5)
CHLORIDE SERPL-SCNC: 111 MMOL/L (ref 95–110)
CHOLEST SERPL-MCNC: 165 MG/DL (ref 120–199)
CHOLEST/HDLC SERPL: 3.7 {RATIO} (ref 2–5)
CO2 SERPL-SCNC: 23 MMOL/L (ref 23–29)
CREAT SERPL-MCNC: 1.2 MG/DL (ref 0.5–1.4)
CV ECHO LV RWT: 0.36 CM
DIFFERENTIAL METHOD: ABNORMAL
DIFFERENTIAL METHOD: ABNORMAL
DOP CALC AO PEAK VEL: 1.39 M/S
DOP CALC AO VTI: 29.61 CM
DOP CALC LVOT AREA: 3.7 CM2
DOP CALC LVOT DIAMETER: 2.18 CM
DOP CALC LVOT PEAK VEL: 1.03 M/S
DOP CALC LVOT STROKE VOLUME: 79.13 CM3
DOP CALCLVOT PEAK VEL VTI: 21.21 CM
E WAVE DECELERATION TIME: 280.62 MSEC
E/A RATIO: 0.95
E/E' RATIO: 7.63 M/S
ECHO LV POSTERIOR WALL: 0.98 CM (ref 0.6–1.1)
EOSINOPHIL # BLD AUTO: 0.1 K/UL (ref 0–0.5)
EOSINOPHIL # BLD AUTO: 0.1 K/UL (ref 0–0.5)
EOSINOPHIL NFR BLD: 0.7 % (ref 0–8)
EOSINOPHIL NFR BLD: 0.7 % (ref 0–8)
ERYTHROCYTE [DISTWIDTH] IN BLOOD BY AUTOMATED COUNT: 12.4 % (ref 11.5–14.5)
ERYTHROCYTE [DISTWIDTH] IN BLOOD BY AUTOMATED COUNT: 12.4 % (ref 11.5–14.5)
EST. GFR  (NO RACE VARIABLE): >60 ML/MIN/1.73 M^2
ESTIMATED AVG GLUCOSE: 108 MG/DL (ref 68–131)
FRACTIONAL SHORTENING: 34 % (ref 28–44)
GLUCOSE SERPL-MCNC: 112 MG/DL (ref 70–110)
HBA1C MFR BLD: 5.4 % (ref 4–5.6)
HCT VFR BLD AUTO: 39.6 % (ref 40–54)
HCT VFR BLD AUTO: 39.6 % (ref 40–54)
HDLC SERPL-MCNC: 45 MG/DL (ref 40–75)
HDLC SERPL: 27.3 % (ref 20–50)
HGB BLD-MCNC: 13.4 G/DL (ref 14–18)
HGB BLD-MCNC: 13.4 G/DL (ref 14–18)
IMM GRANULOCYTES # BLD AUTO: 0.02 K/UL (ref 0–0.04)
IMM GRANULOCYTES # BLD AUTO: 0.02 K/UL (ref 0–0.04)
IMM GRANULOCYTES NFR BLD AUTO: 0.3 % (ref 0–0.5)
IMM GRANULOCYTES NFR BLD AUTO: 0.3 % (ref 0–0.5)
INR PPP: 1 (ref 0.8–1.2)
INR PPP: 1 (ref 0.8–1.2)
INTERVENTRICULAR SEPTUM: 1.08 CM (ref 0.6–1.1)
LA MAJOR: 5.76 CM
LA MINOR: 5.46 CM
LA WIDTH: 3.8 CM
LDLC SERPL CALC-MCNC: 104.8 MG/DL (ref 63–159)
LEFT ATRIUM SIZE: 4.16 CM
LEFT ATRIUM VOLUME INDEX MOD: 22.9 ML/M2
LEFT ATRIUM VOLUME INDEX: 32.3 ML/M2
LEFT ATRIUM VOLUME MOD: 53.39 CM3
LEFT ATRIUM VOLUME: 75.33 CM3
LEFT INTERNAL DIMENSION IN SYSTOLE: 3.65 CM (ref 2.1–4)
LEFT VENTRICLE DIASTOLIC VOLUME INDEX: 63.32 ML/M2
LEFT VENTRICLE DIASTOLIC VOLUME: 147.53 ML
LEFT VENTRICLE MASS INDEX: 95 G/M2
LEFT VENTRICLE SYSTOLIC VOLUME INDEX: 24.1 ML/M2
LEFT VENTRICLE SYSTOLIC VOLUME: 56.23 ML
LEFT VENTRICULAR INTERNAL DIMENSION IN DIASTOLE: 5.5 CM (ref 3.5–6)
LEFT VENTRICULAR MASS: 221.67 G
LV LATERAL E/E' RATIO: 6.78 M/S
LV SEPTAL E/E' RATIO: 8.71 M/S
LYMPHOCYTES # BLD AUTO: 2.2 K/UL (ref 1–4.8)
LYMPHOCYTES # BLD AUTO: 2.2 K/UL (ref 1–4.8)
LYMPHOCYTES NFR BLD: 31.3 % (ref 18–48)
LYMPHOCYTES NFR BLD: 31.3 % (ref 18–48)
MAGNESIUM SERPL-MCNC: 1.8 MG/DL (ref 1.6–2.6)
MCH RBC QN AUTO: 33.1 PG (ref 27–31)
MCH RBC QN AUTO: 33.1 PG (ref 27–31)
MCHC RBC AUTO-ENTMCNC: 33.8 G/DL (ref 32–36)
MCHC RBC AUTO-ENTMCNC: 33.8 G/DL (ref 32–36)
MCV RBC AUTO: 98 FL (ref 82–98)
MCV RBC AUTO: 98 FL (ref 82–98)
MONOCYTES # BLD AUTO: 0.5 K/UL (ref 0.3–1)
MONOCYTES # BLD AUTO: 0.5 K/UL (ref 0.3–1)
MONOCYTES NFR BLD: 7.1 % (ref 4–15)
MONOCYTES NFR BLD: 7.1 % (ref 4–15)
MV A" WAVE DURATION": 12.56 MSEC
MV PEAK A VEL: 0.64 M/S
MV PEAK E VEL: 0.61 M/S
NEUTROPHILS # BLD AUTO: 4.2 K/UL (ref 1.8–7.7)
NEUTROPHILS # BLD AUTO: 4.2 K/UL (ref 1.8–7.7)
NEUTROPHILS NFR BLD: 59.9 % (ref 38–73)
NEUTROPHILS NFR BLD: 59.9 % (ref 38–73)
NONHDLC SERPL-MCNC: 120 MG/DL
NRBC BLD-RTO: 0 /100 WBC
NRBC BLD-RTO: 0 /100 WBC
PHOSPHATE SERPL-MCNC: 3.7 MG/DL (ref 2.7–4.5)
PISA TR MAX VEL: 2.39 M/S
PLATELET # BLD AUTO: 230 K/UL (ref 150–450)
PLATELET # BLD AUTO: 230 K/UL (ref 150–450)
PMV BLD AUTO: 9.7 FL (ref 9.2–12.9)
PMV BLD AUTO: 9.7 FL (ref 9.2–12.9)
POTASSIUM SERPL-SCNC: 3.9 MMOL/L (ref 3.5–5.1)
PROTHROMBIN TIME: 10.3 SEC (ref 9–12.5)
PROTHROMBIN TIME: 10.5 SEC (ref 9–12.5)
PULM VEIN S/D RATIO: 1.16
PV PEAK D VEL: 0.38 M/S
PV PEAK S VEL: 0.44 M/S
RA MAJOR: 5.67 CM
RA PRESSURE ESTIMATED: 3 MMHG
RA WIDTH: 3.16 CM
RBC # BLD AUTO: 4.05 M/UL (ref 4.6–6.2)
RBC # BLD AUTO: 4.05 M/UL (ref 4.6–6.2)
RIGHT VENTRICULAR END-DIASTOLIC DIMENSION: 3.8 CM
RV TB RVSP: 5 MMHG
SINUS: 4.2 CM
SODIUM SERPL-SCNC: 143 MMOL/L (ref 136–145)
SPECIMEN OUTDATE: NORMAL
STJ: 3.55 CM
TDI LATERAL: 0.09 M/S
TDI SEPTAL: 0.07 M/S
TDI: 0.08 M/S
TR MAX PG: 23 MMHG
TRICUSPID ANNULAR PLANE SYSTOLIC EXCURSION: 1.6 CM
TRIGL SERPL-MCNC: 76 MG/DL (ref 30–150)
TROPONIN I SERPL DL<=0.01 NG/ML-MCNC: 0.47 NG/ML (ref 0–0.03)
TROPONIN I SERPL DL<=0.01 NG/ML-MCNC: 1.6 NG/ML (ref 0–0.03)
TROPONIN I SERPL DL<=0.01 NG/ML-MCNC: 1.7 NG/ML (ref 0–0.03)
TROPONIN I SERPL DL<=0.01 NG/ML-MCNC: 1.92 NG/ML (ref 0–0.03)
TV REST PULMONARY ARTERY PRESSURE: 26 MMHG
WBC # BLD AUTO: 7.05 K/UL (ref 3.9–12.7)
WBC # BLD AUTO: 7.05 K/UL (ref 3.9–12.7)
Z-SCORE OF LEFT VENTRICULAR DIMENSION IN END DIASTOLE: -5.4
Z-SCORE OF LEFT VENTRICULAR DIMENSION IN END SYSTOLE: -3.5

## 2023-08-17 PROCEDURE — C1887 CATHETER, GUIDING: HCPCS | Performed by: INTERNAL MEDICINE

## 2023-08-17 PROCEDURE — 36415 COLL VENOUS BLD VENIPUNCTURE: CPT | Performed by: STUDENT IN AN ORGANIZED HEALTH CARE EDUCATION/TRAINING PROGRAM

## 2023-08-17 PROCEDURE — 85610 PROTHROMBIN TIME: CPT | Mod: 91 | Performed by: STUDENT IN AN ORGANIZED HEALTH CARE EDUCATION/TRAINING PROGRAM

## 2023-08-17 PROCEDURE — 99152 MOD SED SAME PHYS/QHP 5/>YRS: CPT | Mod: ,,, | Performed by: INTERNAL MEDICINE

## 2023-08-17 PROCEDURE — 84100 ASSAY OF PHOSPHORUS: CPT | Performed by: PHYSICIAN ASSISTANT

## 2023-08-17 PROCEDURE — 93005 ELECTROCARDIOGRAM TRACING: CPT

## 2023-08-17 PROCEDURE — 99223 PR INITIAL HOSPITAL CARE,LEVL III: ICD-10-PCS | Mod: ,,, | Performed by: PHYSICIAN ASSISTANT

## 2023-08-17 PROCEDURE — 85730 THROMBOPLASTIN TIME PARTIAL: CPT | Mod: 91 | Performed by: STUDENT IN AN ORGANIZED HEALTH CARE EDUCATION/TRAINING PROGRAM

## 2023-08-17 PROCEDURE — 99152 PR MOD CONSCIOUS SEDATION, SAME PHYS, 5+ YRS, FIRST 15 MIN: ICD-10-PCS | Mod: ,,, | Performed by: INTERNAL MEDICINE

## 2023-08-17 PROCEDURE — C1769 GUIDE WIRE: HCPCS | Performed by: INTERNAL MEDICINE

## 2023-08-17 PROCEDURE — 84484 ASSAY OF TROPONIN QUANT: CPT | Mod: 91 | Performed by: STUDENT IN AN ORGANIZED HEALTH CARE EDUCATION/TRAINING PROGRAM

## 2023-08-17 PROCEDURE — 99152 MOD SED SAME PHYS/QHP 5/>YRS: CPT | Performed by: INTERNAL MEDICINE

## 2023-08-17 PROCEDURE — 99499 NO LOS: ICD-10-PCS | Mod: ,,, | Performed by: STUDENT IN AN ORGANIZED HEALTH CARE EDUCATION/TRAINING PROGRAM

## 2023-08-17 PROCEDURE — 94761 N-INVAS EAR/PLS OXIMETRY MLT: CPT

## 2023-08-17 PROCEDURE — 93010 EKG 12-LEAD: ICD-10-PCS | Mod: ,,, | Performed by: INTERNAL MEDICINE

## 2023-08-17 PROCEDURE — 99233 SBSQ HOSP IP/OBS HIGH 50: CPT | Mod: 25,ICN,, | Performed by: INTERNAL MEDICINE

## 2023-08-17 PROCEDURE — 36415 COLL VENOUS BLD VENIPUNCTURE: CPT | Performed by: PHYSICIAN ASSISTANT

## 2023-08-17 PROCEDURE — 25000003 PHARM REV CODE 250: Performed by: PHYSICIAN ASSISTANT

## 2023-08-17 PROCEDURE — 85610 PROTHROMBIN TIME: CPT | Performed by: EMERGENCY MEDICINE

## 2023-08-17 PROCEDURE — 99499 UNLISTED E&M SERVICE: CPT | Mod: ,,, | Performed by: STUDENT IN AN ORGANIZED HEALTH CARE EDUCATION/TRAINING PROGRAM

## 2023-08-17 PROCEDURE — 96375 TX/PRO/DX INJ NEW DRUG ADDON: CPT

## 2023-08-17 PROCEDURE — 63600175 PHARM REV CODE 636 W HCPCS: Performed by: INTERNAL MEDICINE

## 2023-08-17 PROCEDURE — 93458 PR CATH PLACE/CORON ANGIO, IMG SUPER/INTERP,W LEFT HEART VENTRICULOGRAPHY: ICD-10-PCS | Mod: 26,,, | Performed by: INTERNAL MEDICINE

## 2023-08-17 PROCEDURE — 25000003 PHARM REV CODE 250

## 2023-08-17 PROCEDURE — 83036 HEMOGLOBIN GLYCOSYLATED A1C: CPT | Performed by: PHYSICIAN ASSISTANT

## 2023-08-17 PROCEDURE — 20600001 HC STEP DOWN PRIVATE ROOM

## 2023-08-17 PROCEDURE — 93458 L HRT ARTERY/VENTRICLE ANGIO: CPT | Performed by: INTERNAL MEDICINE

## 2023-08-17 PROCEDURE — 93458 L HRT ARTERY/VENTRICLE ANGIO: CPT | Mod: 26,,, | Performed by: INTERNAL MEDICINE

## 2023-08-17 PROCEDURE — 84484 ASSAY OF TROPONIN QUANT: CPT | Performed by: EMERGENCY MEDICINE

## 2023-08-17 PROCEDURE — 96374 THER/PROPH/DIAG INJ IV PUSH: CPT

## 2023-08-17 PROCEDURE — 63600175 PHARM REV CODE 636 W HCPCS: Performed by: STUDENT IN AN ORGANIZED HEALTH CARE EDUCATION/TRAINING PROGRAM

## 2023-08-17 PROCEDURE — 25000242 PHARM REV CODE 250 ALT 637 W/ HCPCS: Performed by: PHYSICIAN ASSISTANT

## 2023-08-17 PROCEDURE — 84484 ASSAY OF TROPONIN QUANT: CPT | Mod: 91 | Performed by: PHYSICIAN ASSISTANT

## 2023-08-17 PROCEDURE — 85025 COMPLETE CBC W/AUTO DIFF WBC: CPT | Performed by: EMERGENCY MEDICINE

## 2023-08-17 PROCEDURE — 85025 COMPLETE CBC W/AUTO DIFF WBC: CPT | Mod: 91 | Performed by: STUDENT IN AN ORGANIZED HEALTH CARE EDUCATION/TRAINING PROGRAM

## 2023-08-17 PROCEDURE — 25000003 PHARM REV CODE 250: Performed by: STUDENT IN AN ORGANIZED HEALTH CARE EDUCATION/TRAINING PROGRAM

## 2023-08-17 PROCEDURE — 80048 BASIC METABOLIC PNL TOTAL CA: CPT | Performed by: PHYSICIAN ASSISTANT

## 2023-08-17 PROCEDURE — 99233 PR SUBSEQUENT HOSPITAL CARE,LEVL III: ICD-10-PCS | Mod: 25,ICN,, | Performed by: INTERNAL MEDICINE

## 2023-08-17 PROCEDURE — 83735 ASSAY OF MAGNESIUM: CPT | Performed by: PHYSICIAN ASSISTANT

## 2023-08-17 PROCEDURE — C1894 INTRO/SHEATH, NON-LASER: HCPCS | Performed by: INTERNAL MEDICINE

## 2023-08-17 PROCEDURE — 86900 BLOOD TYPING SEROLOGIC ABO: CPT | Performed by: INTERNAL MEDICINE

## 2023-08-17 PROCEDURE — 93010 ELECTROCARDIOGRAM REPORT: CPT | Mod: 76,,, | Performed by: INTERNAL MEDICINE

## 2023-08-17 PROCEDURE — 93010 ELECTROCARDIOGRAM REPORT: CPT | Mod: ,,, | Performed by: INTERNAL MEDICINE

## 2023-08-17 PROCEDURE — 25000003 PHARM REV CODE 250: Performed by: INTERNAL MEDICINE

## 2023-08-17 PROCEDURE — 99223 1ST HOSP IP/OBS HIGH 75: CPT | Mod: ,,, | Performed by: PHYSICIAN ASSISTANT

## 2023-08-17 PROCEDURE — 93010 EKG 12-LEAD: ICD-10-PCS | Mod: 76,,, | Performed by: INTERNAL MEDICINE

## 2023-08-17 PROCEDURE — 80061 LIPID PANEL: CPT | Performed by: PHYSICIAN ASSISTANT

## 2023-08-17 PROCEDURE — 63600175 PHARM REV CODE 636 W HCPCS: Performed by: EMERGENCY MEDICINE

## 2023-08-17 PROCEDURE — 25500020 PHARM REV CODE 255: Performed by: INTERNAL MEDICINE

## 2023-08-17 PROCEDURE — 85730 THROMBOPLASTIN TIME PARTIAL: CPT | Performed by: EMERGENCY MEDICINE

## 2023-08-17 RX ORDER — LOSARTAN POTASSIUM 50 MG/1
50 TABLET ORAL DAILY
Status: DISCONTINUED | OUTPATIENT
Start: 2023-08-18 | End: 2023-08-18 | Stop reason: HOSPADM

## 2023-08-17 RX ORDER — ASPIRIN 325 MG
325 TABLET ORAL DAILY
Status: DISCONTINUED | OUTPATIENT
Start: 2023-08-17 | End: 2023-08-17

## 2023-08-17 RX ORDER — ONDANSETRON 8 MG/1
8 TABLET, ORALLY DISINTEGRATING ORAL EVERY 8 HOURS PRN
Status: DISCONTINUED | OUTPATIENT
Start: 2023-08-17 | End: 2023-08-18 | Stop reason: HOSPADM

## 2023-08-17 RX ORDER — PROMETHAZINE HYDROCHLORIDE 25 MG/1
25 TABLET ORAL EVERY 6 HOURS PRN
Status: DISCONTINUED | OUTPATIENT
Start: 2023-08-17 | End: 2023-08-18 | Stop reason: HOSPADM

## 2023-08-17 RX ORDER — DIPHENHYDRAMINE HCL 50 MG
50 CAPSULE ORAL ONCE
Status: DISCONTINUED | OUTPATIENT
Start: 2023-08-17 | End: 2023-08-17

## 2023-08-17 RX ORDER — LOSARTAN POTASSIUM 25 MG/1
25 TABLET ORAL DAILY
Status: DISCONTINUED | OUTPATIENT
Start: 2023-08-17 | End: 2023-08-17

## 2023-08-17 RX ORDER — SODIUM CHLORIDE 0.9 % (FLUSH) 0.9 %
10 SYRINGE (ML) INJECTION
Status: DISCONTINUED | OUTPATIENT
Start: 2023-08-17 | End: 2023-08-18 | Stop reason: HOSPADM

## 2023-08-17 RX ORDER — FENTANYL CITRATE 50 UG/ML
INJECTION, SOLUTION INTRAMUSCULAR; INTRAVENOUS
Status: DISCONTINUED | OUTPATIENT
Start: 2023-08-17 | End: 2023-08-18 | Stop reason: HOSPADM

## 2023-08-17 RX ORDER — SODIUM CHLORIDE 9 MG/ML
INJECTION, SOLUTION INTRAVENOUS CONTINUOUS
Status: ACTIVE | OUTPATIENT
Start: 2023-08-17 | End: 2023-08-17

## 2023-08-17 RX ORDER — IBUPROFEN 200 MG
16 TABLET ORAL
Status: DISCONTINUED | OUTPATIENT
Start: 2023-08-17 | End: 2023-08-18 | Stop reason: HOSPADM

## 2023-08-17 RX ORDER — HYDROCHLOROTHIAZIDE 12.5 MG/1
25 TABLET ORAL DAILY
Status: DISCONTINUED | OUTPATIENT
Start: 2023-08-18 | End: 2023-08-18 | Stop reason: HOSPADM

## 2023-08-17 RX ORDER — TALC
6 POWDER (GRAM) TOPICAL NIGHTLY PRN
Status: DISCONTINUED | OUTPATIENT
Start: 2023-08-17 | End: 2023-08-18 | Stop reason: HOSPADM

## 2023-08-17 RX ORDER — CLOPIDOGREL 300 MG/1
600 TABLET, FILM COATED ORAL ONCE
Status: DISCONTINUED | OUTPATIENT
Start: 2023-08-17 | End: 2023-08-17

## 2023-08-17 RX ORDER — MORPHINE SULFATE 4 MG/ML
4 INJECTION, SOLUTION INTRAMUSCULAR; INTRAVENOUS
Status: COMPLETED | OUTPATIENT
Start: 2023-08-17 | End: 2023-08-17

## 2023-08-17 RX ORDER — CLOPIDOGREL BISULFATE 75 MG/1
75 TABLET ORAL DAILY
Status: DISCONTINUED | OUTPATIENT
Start: 2023-08-18 | End: 2023-08-17

## 2023-08-17 RX ORDER — GLUCAGON 1 MG
1 KIT INJECTION
Status: DISCONTINUED | OUTPATIENT
Start: 2023-08-17 | End: 2023-08-18 | Stop reason: HOSPADM

## 2023-08-17 RX ORDER — METOPROLOL TARTRATE 25 MG/1
25 TABLET, FILM COATED ORAL 2 TIMES DAILY
Status: DISCONTINUED | OUTPATIENT
Start: 2023-08-17 | End: 2023-08-18

## 2023-08-17 RX ORDER — HYDRALAZINE HYDROCHLORIDE 25 MG/1
25 TABLET, FILM COATED ORAL EVERY 8 HOURS PRN
Status: DISCONTINUED | OUTPATIENT
Start: 2023-08-17 | End: 2023-08-17

## 2023-08-17 RX ORDER — NAPROXEN SODIUM 220 MG/1
81 TABLET, FILM COATED ORAL DAILY
Status: DISCONTINUED | OUTPATIENT
Start: 2023-08-18 | End: 2023-08-17

## 2023-08-17 RX ORDER — ATORVASTATIN CALCIUM 20 MG/1
80 TABLET, FILM COATED ORAL DAILY
Status: DISCONTINUED | OUTPATIENT
Start: 2023-08-17 | End: 2023-08-18 | Stop reason: HOSPADM

## 2023-08-17 RX ORDER — METOPROLOL TARTRATE 25 MG/1
TABLET, FILM COATED ORAL
Status: COMPLETED
Start: 2023-08-17 | End: 2023-08-17

## 2023-08-17 RX ORDER — DIPHENHYDRAMINE HCL 50 MG
CAPSULE ORAL
Status: DISCONTINUED
Start: 2023-08-17 | End: 2023-08-17 | Stop reason: WASHOUT

## 2023-08-17 RX ORDER — CLOPIDOGREL BISULFATE 75 MG/1
75 TABLET ORAL DAILY
Status: DISCONTINUED | OUTPATIENT
Start: 2023-08-17 | End: 2023-08-17

## 2023-08-17 RX ORDER — HEPARIN SODIUM,PORCINE/D5W 25000/250
0-40 INTRAVENOUS SOLUTION INTRAVENOUS CONTINUOUS
Status: DISCONTINUED | OUTPATIENT
Start: 2023-08-17 | End: 2023-08-17

## 2023-08-17 RX ORDER — HEPARIN SODIUM 1000 [USP'U]/ML
INJECTION, SOLUTION INTRAVENOUS; SUBCUTANEOUS
Status: DISCONTINUED | OUTPATIENT
Start: 2023-08-17 | End: 2023-08-18 | Stop reason: HOSPADM

## 2023-08-17 RX ORDER — BISACODYL 10 MG
10 SUPPOSITORY, RECTAL RECTAL DAILY PRN
Status: DISCONTINUED | OUTPATIENT
Start: 2023-08-17 | End: 2023-08-18 | Stop reason: HOSPADM

## 2023-08-17 RX ORDER — BUTALBITAL, ACETAMINOPHEN AND CAFFEINE 50; 325; 40 MG/1; MG/1; MG/1
1 TABLET ORAL EVERY 4 HOURS PRN
Status: DISCONTINUED | OUTPATIENT
Start: 2023-08-17 | End: 2023-08-18 | Stop reason: HOSPADM

## 2023-08-17 RX ORDER — HYDRALAZINE HYDROCHLORIDE 20 MG/ML
5 INJECTION INTRAMUSCULAR; INTRAVENOUS EVERY 6 HOURS PRN
Status: DISCONTINUED | OUTPATIENT
Start: 2023-08-17 | End: 2023-08-18 | Stop reason: HOSPADM

## 2023-08-17 RX ORDER — HEPARIN SODIUM 10000 [USP'U]/100ML
INJECTION, SOLUTION INTRAVENOUS
Status: DISPENSED
Start: 2023-08-17 | End: 2023-08-17

## 2023-08-17 RX ORDER — IBUPROFEN 200 MG
24 TABLET ORAL
Status: DISCONTINUED | OUTPATIENT
Start: 2023-08-17 | End: 2023-08-18 | Stop reason: HOSPADM

## 2023-08-17 RX ORDER — LOSARTAN POTASSIUM 25 MG/1
TABLET ORAL
Status: DISPENSED
Start: 2023-08-17 | End: 2023-08-17

## 2023-08-17 RX ORDER — NAPROXEN SODIUM 220 MG/1
81 TABLET, FILM COATED ORAL DAILY
Status: DISCONTINUED | OUTPATIENT
Start: 2023-08-17 | End: 2023-08-18 | Stop reason: HOSPADM

## 2023-08-17 RX ORDER — POLYETHYLENE GLYCOL 3350 17 G/17G
17 POWDER, FOR SOLUTION ORAL DAILY PRN
Status: DISCONTINUED | OUTPATIENT
Start: 2023-08-17 | End: 2023-08-18 | Stop reason: HOSPADM

## 2023-08-17 RX ORDER — CLOPIDOGREL 300 MG/1
300 TABLET, FILM COATED ORAL ONCE
Status: COMPLETED | OUTPATIENT
Start: 2023-08-17 | End: 2023-08-17

## 2023-08-17 RX ORDER — NITROGLYCERIN 0.4 MG/1
0.4 TABLET SUBLINGUAL EVERY 5 MIN PRN
Status: DISCONTINUED | OUTPATIENT
Start: 2023-08-17 | End: 2023-08-18 | Stop reason: HOSPADM

## 2023-08-17 RX ORDER — METOPROLOL TARTRATE 25 MG/1
25 TABLET, FILM COATED ORAL 2 TIMES DAILY
Status: DISCONTINUED | OUTPATIENT
Start: 2023-08-17 | End: 2023-08-17

## 2023-08-17 RX ORDER — HEPARIN SOD,PORCINE/0.9 % NACL 1000/500ML
INTRAVENOUS SOLUTION INTRAVENOUS
Status: DISCONTINUED | OUTPATIENT
Start: 2023-08-17 | End: 2023-08-18 | Stop reason: HOSPADM

## 2023-08-17 RX ORDER — CLOPIDOGREL 300 MG/1
TABLET, FILM COATED ORAL
Status: DISPENSED
Start: 2023-08-17 | End: 2023-08-17

## 2023-08-17 RX ORDER — LOSARTAN POTASSIUM 25 MG/1
25 TABLET ORAL ONCE
Status: COMPLETED | OUTPATIENT
Start: 2023-08-17 | End: 2023-08-17

## 2023-08-17 RX ORDER — LIDOCAINE HYDROCHLORIDE 20 MG/ML
INJECTION, SOLUTION INFILTRATION; PERINEURAL
Status: DISCONTINUED | OUTPATIENT
Start: 2023-08-17 | End: 2023-08-18 | Stop reason: HOSPADM

## 2023-08-17 RX ORDER — MIDAZOLAM HYDROCHLORIDE 1 MG/ML
INJECTION INTRAMUSCULAR; INTRAVENOUS
Status: DISCONTINUED | OUTPATIENT
Start: 2023-08-17 | End: 2023-08-18 | Stop reason: HOSPADM

## 2023-08-17 RX ORDER — POTASSIUM CHLORIDE 750 MG/1
10 CAPSULE, EXTENDED RELEASE ORAL ONCE
Status: COMPLETED | OUTPATIENT
Start: 2023-08-17 | End: 2023-08-17

## 2023-08-17 RX ORDER — DIPHENHYDRAMINE HCL 50 MG
50 CAPSULE ORAL ONCE
Status: COMPLETED | OUTPATIENT
Start: 2023-08-17 | End: 2023-08-17

## 2023-08-17 RX ORDER — MORPHINE SULFATE 4 MG/ML
INJECTION, SOLUTION INTRAMUSCULAR; INTRAVENOUS
Status: DISPENSED
Start: 2023-08-17 | End: 2023-08-17

## 2023-08-17 RX ORDER — ACETAMINOPHEN 325 MG/1
650 TABLET ORAL EVERY 4 HOURS PRN
Status: DISCONTINUED | OUTPATIENT
Start: 2023-08-17 | End: 2023-08-18 | Stop reason: HOSPADM

## 2023-08-17 RX ADMIN — METOPROLOL TARTRATE 25 MG: 25 TABLET, FILM COATED ORAL at 08:08

## 2023-08-17 RX ADMIN — NITROGLYCERIN 0.4 MG: 0.4 TABLET, ORALLY DISINTEGRATING SUBLINGUAL at 08:08

## 2023-08-17 RX ADMIN — ONDANSETRON 8 MG: 8 TABLET, ORALLY DISINTEGRATING ORAL at 12:08

## 2023-08-17 RX ADMIN — DIPHENHYDRAMINE HYDROCHLORIDE 50 MG: 50 CAPSULE ORAL at 02:08

## 2023-08-17 RX ADMIN — ACETAMINOPHEN 650 MG: 325 TABLET ORAL at 11:08

## 2023-08-17 RX ADMIN — CLOPIDOGREL BISULFATE 300 MG: 300 TABLET, FILM COATED ORAL at 08:08

## 2023-08-17 RX ADMIN — ATORVASTATIN CALCIUM 80 MG: 20 TABLET, FILM COATED ORAL at 08:08

## 2023-08-17 RX ADMIN — ASPIRIN 81 MG 81 MG: 81 TABLET ORAL at 08:08

## 2023-08-17 RX ADMIN — MORPHINE SULFATE 4 MG: 4 INJECTION INTRAVENOUS at 12:08

## 2023-08-17 RX ADMIN — HEPARIN SODIUM AND DEXTROSE 12 UNITS/KG/HR: 10000; 5 INJECTION INTRAVENOUS at 08:08

## 2023-08-17 RX ADMIN — HEPARIN SODIUM AND DEXTROSE 15 UNITS/KG/HR: 10000; 5 INJECTION INTRAVENOUS at 12:08

## 2023-08-17 RX ADMIN — SODIUM CHLORIDE: 0.9 INJECTION, SOLUTION INTRAVENOUS at 04:08

## 2023-08-17 RX ADMIN — METOPROLOL TARTRATE 25 MG: 25 TABLET, FILM COATED ORAL at 02:08

## 2023-08-17 RX ADMIN — POTASSIUM CHLORIDE 10 MEQ: 10 CAPSULE, COATED, EXTENDED RELEASE ORAL at 07:08

## 2023-08-17 RX ADMIN — HEPARIN SODIUM AND DEXTROSE 12 UNITS/KG/HR: 10000; 5 INJECTION INTRAVENOUS at 12:08

## 2023-08-17 RX ADMIN — LOSARTAN POTASSIUM 25 MG: 25 TABLET, FILM COATED ORAL at 02:08

## 2023-08-17 RX ADMIN — HYDRALAZINE HYDROCHLORIDE 5 MG: 20 INJECTION INTRAMUSCULAR; INTRAVENOUS at 07:08

## 2023-08-17 RX ADMIN — SODIUM CHLORIDE: 9 INJECTION, SOLUTION INTRAVENOUS at 02:08

## 2023-08-17 RX ADMIN — BUTALBITAL, ACETAMINOPHEN, AND CAFFEINE 1 TABLET: 325; 50; 40 TABLET ORAL at 12:08

## 2023-08-17 RX ADMIN — LOSARTAN POTASSIUM 25 MG: 25 TABLET, FILM COATED ORAL at 06:08

## 2023-08-17 NOTE — NURSING
Patient admitted to cardiac stepdown unit. Patient introduced to the unit, with c/o of chest pain 6 out 10. BP of 170/110, denies, N/V, SOB.     Med Team C notified, order for Losartan and EKG obtained.     Call light placed by bedside, patient assessed, bed braked and in low position, and asked patient to call for assistance.       Nurses Note -- 4 Eyes      8/17/2023   5:50 AM      Skin assessed during: Admit      [x] No Altered Skin Integrity Present    []Prevention Measures Documented      [] Yes- Altered Skin Integrity Present or Discovered   [] LDA Added if Not in Epic (Describe Wound)   [] New Altered Skin Integrity was Present on Admit and Documented in LDA   [] Wound Image Taken    Wound Care Consulted? No    Attending Nurse: Orquidea Blake RN     Second RN/Staff Member:  Carrie GUZMÁN RN

## 2023-08-17 NOTE — CONSULTS
"    [facility] - Cardiology Stepdown  Adult Nutrition  Consult Note    SUMMARY     Recommendations    1.) Recommend when medically feasible to ADAT, with goal of Cardiac diet, fluid per MD.     2.) If PO intake <50%, recommend Boost Plus TID to help optimize PRO/Kcal intake.     3.) RD to monitor wt, PO intake, skin, labs.      Goals: to meet % of EEN/EPN by next RD f/u  Nutrition Goal Status: new  Communication of RD Recs:  (POC)    Assessment and Plan    Nutrition Problem  Nutrition related knowledge deficit    Related to (etiology):   Lack of heart healthy dietary knowledge    Signs and Symptoms (as evidenced by):   S/p NSTEMI and need for cardiac diet and low Na diet education    Interventions/Recommendations (treatment strategy):  Collaboration of nutritional care with other providers.  Cardiac and low Na diet edu provided on 8/17/23    Nutrition Diagnosis Status:   New     Reason for Assessment    Reason For Assessment: consult  Diagnosis: cardiac disease (NSTEMI)  Relevant Medical History: obesity, GERD, KAUSHAL  Interdisciplinary Rounds: did not attend  General Information Comments: RD consulted: Pt was seen by RD. Pt denies n/v/d/c. Pt is currently NPO, for a coronary angiography scheduled for this afternoon. Pt stated that they had a good appetite PTA, eating 2-3 meals/day and snacking. Pt appears nourished with s/s of malnutrition. Pt stated that over the past the year, they had a 30# wt gain.  Nutrition Discharge Planning: Cardiac diet- Pt was provided with Low Na and cardiac diet handouts on 8/17/23. Caregiver was present. All questions and concerns were answered. RD contact information was provided.    Nutrition Risk Screen    Nutrition Risk Screen: no indicators present, other (see comments) (npo)    Anthropometrics    Temp: 98.7 °F (37.1 °C)  Height Method: Stated  Height: 5' 11" (180.3 cm)  Height (inches): 71 in  Weight Method: Standard Scale  Weight: 115.2 kg (254 lb)  Weight (lb): 254 lb  Ideal " Body Weight (IBW), Male: 172 lb  % Ideal Body Weight, Male (lb): 147.67 %  BMI (Calculated): 35.4  BMI Grade: 35 - 39.9 - obesity - grade II     Lab/Procedures/Meds    Pertinent Labs Reviewed: reviewed  Pertinent Labs Comments: gluc: 112, Ca: 8.5  Pertinent Medications Reviewed: reviewed  Pertinent Medications Comments: atorvastatin, heparin    Estimated/Assessed Needs    Weight Used For Calorie Calculations: 115.2 kg (253 lb 15.5 oz)  Energy Calorie Requirements (kcal): 1984 kcal  Energy Need Method: [place]-St Jeor (MSJ x 1.0- d/t obesity)  Protein Requirements: 113- 150g (1.5-2.0g/kg of IBW)  Weight Used For Protein Calculations: 75 kg (165 lb 5.5 oz) (IBW d/t obesity)  Fluid Requirements (mL): 1ml/1kcal or per MD  Estimated Fluid Requirement Method: RDA Method  RDA Method (mL): 1984     Nutrition Prescription Ordered    Current Diet Order: NPO    Evaluation of Received Nutrient/Fluid Intake    I/O: incomplete  Energy Calories Required: not meeting needs  Protein Required: not meeting needs  Fluid Required:  (as per MD)  Comments: LBM 8/16  % Intake of Estimated Energy Needs: 0 - 25 %  % Meal Intake: NPO    Nutrition Risk    Level of Risk/Frequency of Follow-up:  (RD to f/u x1/week)     Monitor and Evaluation    Food and Nutrient Intake: energy intake, food and beverage intake  Food and Nutrient Adminstration: diet order  Knowledge/Beliefs/Attitudes: food and nutrition knowledge/skill, beliefs and attitudes  Physical Activity and Function: nutrition-related ADLs and IADLs  Anthropometric Measurements: weight, weight change, body mass index  Biochemical Data, Medical Tests and Procedures: electrolyte and renal panel, gastrointestinal profile, glucose/endocrine profile, inflammatory profile, lipid profile  Nutrition-Focused Physical Findings: overall appearance, skin     Nutrition Follow-Up    RD Follow-up?: Yes

## 2023-08-17 NOTE — PROGRESS NOTES
08/17/23 0826   Vital Signs   Pulse 85   Heart Rate Source Monitor   Resp 18   Device (Oxygen Therapy) room air   BP (!) 164/98   MAP (mmHg) 122   BP Location Left arm   BP Method Automatic   Patient Position Lying     Md at bedside. 3 doses of nitro given. Minimal relief per pt. Will continue plan of care.

## 2023-08-17 NOTE — ED TRIAGE NOTES
Naldo Barakat, a 58 y.o. male presents to the ED w/ complaint of tightness in mid sternal chest after playing tennis, bilateral tingling to upper extremities in forearms. Denies N/V. Took 2 aspirin 3 hours ago. Denies any history of cardiac issues.      Triage note:  Chief Complaint   Patient presents with    Chest Pain     Midsternal CP, described as tightness starting at 1930 while playing tennis, denies cardiac hx     Review of patient's allergies indicates:   Allergen Reactions    Erythromycin     Macrolide antibiotics      Past Medical History:   Diagnosis Date    Allergy     Eczema

## 2023-08-17 NOTE — ED PROVIDER NOTES
Encounter Date: 8/16/2023       History     Chief Complaint   Patient presents with    Chest Pain     Midsternal CP, described as tightness starting at 1930 while playing tennis, denies cardiac hx     58-year-old male, healthy, complaining of chest tightness, onset around 8:00 p.m. this evening, has been persistent since that time, associated with some paresthesias in his arms bilaterally.  Symptoms started when he was about 45 minutes into playing tennis.  There is no radiation of the pain into his back or neck.  He is had no nausea or vomiting.  He is had no shortness of breath or leg swelling.  He went home, took a shower, played a board game with his wife and the symptoms did not improve.  He took 2 aspirin.  Ultimately decided to come in to be evaluated.  He has no family history of CAD, no smoking or drug history.    The history is provided by the patient and the spouse.     Review of patient's allergies indicates:   Allergen Reactions    Erythromycin     Macrolide antibiotics      Past Medical History:   Diagnosis Date    Allergy     Eczema      Past Surgical History:   Procedure Laterality Date    COLONOSCOPY N/A 2/15/2016    Procedure: COLONOSCOPY;  Surgeon: STONE Sanchez MD;  Location: [address]);  Service: Endoscopy;  Laterality: N/A;  PM Prep    HERNIA REPAIR       Family History   Problem Relation Age of Onset    Cancer Mother         lung    Breast cancer Mother     Cancer Father         liver    Lung disease Sister     No Known Problems Brother     No Known Problems Sister     Asthma Neg Hx      Social History     Tobacco Use    Smoking status: Never    Smokeless tobacco: Never   Substance Use Topics    Alcohol use: Yes     Comment: limited     Review of Systems    Physical Exam     Initial Vitals [08/16/23 2155]   BP Pulse Resp Temp SpO2   (!) 188/133 87 18 98.1 °F (36.7 °C) 97 %      MAP       --         Physical Exam    Nursing note and vitals reviewed.  Constitutional: Vital signs are  normal. He appears well-developed and well-nourished. He is not diaphoretic.  Non-toxic appearance. He does not appear ill. No distress.   HENT:   Head: Normocephalic and atraumatic.   Mouth/Throat: Mucous membranes are normal. Mucous membranes are not dry.   Eyes: Conjunctivae and lids are normal.   Neck: Neck supple.   Normal range of motion.  Cardiovascular:  Normal rate, regular rhythm and normal heart sounds.           No murmur heard.  Pulmonary/Chest: Breath sounds normal. No respiratory distress. He has no wheezes.   Musculoskeletal:         General: No edema.      Cervical back: Normal range of motion and neck supple.     Neurological: He is alert and oriented to person, place, and time.   Skin: Skin is dry and intact. No pallor.   Psychiatric: He has a normal mood and affect. His speech is normal and behavior is normal.         ED Course   Procedures  Labs Reviewed   CBC W/ AUTO DIFFERENTIAL - Abnormal; Notable for the following components:       Result Value    RBC 4.38 (*)     MCH 33.1 (*)     All other components within normal limits   COMPREHENSIVE METABOLIC PANEL - Abnormal; Notable for the following components:    CO2 22 (*)     All other components within normal limits   TROPONIN I - Abnormal; Notable for the following components:    Troponin I 0.066 (*)     All other components within normal limits   B-TYPE NATRIURETIC PEPTIDE   APTT    Narrative:     Draw baseline aPTT prior to starting the heparin bolus or  infusion  (if patient is on warfarin prior to heparin therapy)   PROTIME-INR    Narrative:     Draw baseline aPTT prior to starting the heparin bolus or  infusion  (if patient is on warfarin prior to heparin therapy)   POCT TROPONIN   POCT TROPONIN     EKG Readings: (Independently Interpreted)   Initial Reading: No STEMI. Rhythm: Normal Sinus Rhythm. Ectopy: No Ectopy. Conduction: Normal. ST Segments: Normal ST Segments. T Waves: Normal.       Imaging Results              X-Ray Chest PA And  "Lateral (Final result)  Result time 08/17/23 00:23:47      Final result by Yaniv Pearson MD (08/17/23 00:23:47)                   Impression:      No acute cardiopulmonary finding.      Electronically signed by: Yaniv Pearson MD  Date:    08/17/2023  Time:    00:23               Narrative:    EXAMINATION:  XR CHEST PA AND LATERAL    CLINICAL HISTORY:  Provided history is "  Chest pain, unspecified".    TECHNIQUE:  Frontal and lateral views of the chest were performed.    COMPARISON:  08/01/2022.    FINDINGS:  Cardiac silhouette is not enlarged. Lung volumes are relatively low, accentuating basilar markings.  No confluent area of consolidation.  No sizable pleural effusion.  No pneumothorax.                                       Medications   heparin 25,000 units in dextrose 5% 250 mL (100 units/mL) infusion LOW INTENSITY nomogram - [facility] (12 Units/kg/hr × 91.5 kg (Adjusted) Intravenous New Bag 8/17/23 0052)   heparin 25,000 units in dextrose 5% (100 units/ml) IV bolus from bag - ADDITIONAL PRN BOLUS - 60 units/kg (max bolus 4000 units) (has no administration in time range)   heparin 25,000 units in dextrose 5% (100 units/ml) IV bolus from bag - ADDITIONAL PRN BOLUS - 30 units/kg (max bolus 4000 units) (has no administration in time range)   nitroGLYCERIN SL tablet 0.4 mg (0.4 mg Sublingual Given 8/16/23 2346)   morphine injection 4 mg (4 mg Intravenous Given 8/17/23 0046)   heparin 25,000 units in dextrose 5% (100 units/ml) IV bolus from bag INITIAL BOLUS (max bolus 4000 units) (4,000 Units Intravenous Bolus from Bag 8/17/23 0049)     Medical Decision Making:   History:   Old Medical Records: I decided to obtain old medical records.  Old Records Summarized: records from clinic visits and records from previous admission(s).  Initial Assessment:   DDx for chest pain would be broad, including but not limited to serious diagnoses such as ACS, PE, aortic dissection, pericarditis, myocarditis, pneumothorax, " pneumonia or pleural effusion, and esophageal rupture.  Other less serious problems such as panic attack, muscle strain, costochrondritis, pleurisy, GERD, and esophageal spasm also considered.      At this time, my greatest suspicion is for ACS versus musculoskeletal pain   If ACS considered, patient's HEART score is 3    Independently Interpreted Test(s):   I have ordered and independently interpreted X-rays - see prior notes.  I have ordered and independently interpreted EKG Reading(s) - see prior notes  Clinical Tests:   Lab Tests: Ordered and Reviewed  Radiological Study: Ordered and Reviewed  Medical Tests: Reviewed and Ordered  ED Management:  ED work-up will include the following:  -EKG:  No signs of acute ischemia  -Labs:  CBC, CMP, troponin  -Imaging:  Chest x-ray  -Cardiac monitoring  -Medications:  Nitroglycerin, pt already took 2 full dose ASA prior to arrival  -Disposition:  Uncertain pending ED workup                Attending Attestation:         Attending Critical Care:   Critical Care Times:   ==============================================================  Total Critical Care Time - exclusive of procedural time: 35 minutes.  ==============================================================  Critical care was necessary to treat or prevent imminent or life-threatening deterioration of the following conditions: myocardial infarction.   The following critical care procedures were done by me (see procedure notes): pulse oximetry.   Critical care was time spent personally by me on the following activities: obtaining history from patient or relative, examination of patient, review of old charts, review of x-rays / CT sent with the patient, ordering lab, x-rays, and/or EKG, development of treatment plan with patient or relative, ordering and performing treatments and interventions, evaluation of patient's response to treatment, discussion with consultants and re-evaluation of patient's conition.   Critical Care  Condition: potentially life-threatening           ED Course as of 08/17/23 0106   Wed Aug 16, 2023   2353 Troponin I(!): 0.066 [AS]   Thu Aug 17, 2023   0002 Troponin elevated.  Patient reassessed.  He is still having some chest discomfort he got a nitroglycerin and it did nothing in terms of affecting his pain but it did make him feel strange.  Will give morphine 4 mg IV, Tylenol, transfer to ED bed for presumed NSTEMI as start on heparin drip. [AS]   0100 Repeat EKG still without any significant ST abnormalities.  Status post morphine 4 mg patient's pain has largely subsided.  Will admit to Hospital Medicine with plan for Cardiology evaluation in the morning. [AS]      ED Course User Index  [AS] Catrina Robert MD                   Clinical Impression:   Final diagnoses:  [R07.9] Chest pain  [I21.4] NSTEMI (non-ST elevated myocardial infarction) (Primary)        ED Disposition Condition    Admit Stable                Catrina Robert MD  08/17/23 0106

## 2023-08-17 NOTE — SUBJECTIVE & OBJECTIVE
Past Medical History:   Diagnosis Date    Allergy     Eczema        Past Surgical History:   Procedure Laterality Date    COLONOSCOPY N/A 2/15/2016    Procedure: COLONOSCOPY;  Surgeon: STONE Sanchez MD;  Location: [facility] ([address] [place] [address]);  Service: Endoscopy;  Laterality: N/A;  PM Prep    HERNIA REPAIR         Review of patient's allergies indicates:   Allergen Reactions    Erythromycin     Macrolide antibiotics        No current facility-administered medications on file prior to encounter.     Current Outpatient Medications on File Prior to Encounter   Medication Sig    amoxicillin (AMOXIL) 875 MG tablet Take 1 tablet (875 mg total) by mouth 2 (two) times daily. (Patient not taking: Reported on 4/6/2023)    azelastine (ASTELIN) 137 mcg (0.1 %) nasal spray 2 sprays (274 mcg total) by Nasal route 2 (two) times daily.    famotidine (PEPCID) 20 MG tablet Take 1 tablet (20 mg total) by mouth nightly as needed for Heartburn.    guaiFENesin-codeine 100-10 mg/5 ml (TUSSI-ORGANIDIN NR)  mg/5 mL syrup Take 5 mLs by mouth every 4 (four) hours as needed for Cough.    ketoconazole (NIZORAL) 2 % cream Apply topically once daily. (Patient not taking: Reported on 4/6/2023)    predniSONE (DELTASONE) 20 MG tablet Take 3 tablets by mouth daily for 3 days, then 2 tablets  daily for 3 days, then 1 tablets  daily for 3 days     Family History       Problem Relation (Age of Onset)    Breast cancer Mother    Cancer Mother, Father    Lung disease Sister    No Known Problems Brother, Sister          Tobacco Use    Smoking status: Never    Smokeless tobacco: Never   Substance and Sexual Activity    Alcohol use: Yes     Comment: limited    Drug use: Not on file    Sexual activity: Not on file     Review of Systems   Constitutional: Negative for chills, fever and malaise/fatigue.   Eyes:  Negative for blurred vision and double vision.   Cardiovascular:  Positive for chest pain (pressure/tightness). Negative for irregular heartbeat, leg  swelling and palpitations.   Respiratory:  Negative for shortness of breath and wheezing.    Gastrointestinal:  Negative for abdominal pain, constipation, diarrhea, nausea and vomiting.   Neurological:  Negative for headaches, light-headedness and weakness.   All other systems reviewed and are negative.    Objective:     Vital Signs (Most Recent):  Temp: 98.7 °F (37.1 °C) (08/17/23 1111)  Pulse: 68 (08/17/23 1132)  Resp: 18 (08/17/23 1111)  BP: (!) 143/91 (08/17/23 1111)  SpO2: 96 % (08/17/23 1111) Vital Signs (24h Range):  Temp:  [97.8 °F (36.6 °C)-98.7 °F (37.1 °C)] 98.7 °F (37.1 °C)  Pulse:  [54-87] 68  Resp:  [12-19] 18  SpO2:  [95 %-99 %] 96 %  BP: (128-192)/() 143/91     Weight: 115.2 kg (254 lb)  Body mass index is 35.43 kg/m².    SpO2: 96 %       No intake or output data in the 24 hours ending 08/17/23 1156    Lines/Drains/Airways       Peripheral Intravenous Line  Duration                  Peripheral IV - Single Lumen 08/16/23 2309 20 G Left Antecubital <1 day         Peripheral IV - Single Lumen 08/17/23 0107 22 G Anterior;Right Forearm <1 day                     Physical Exam  Vitals and nursing note reviewed.   Constitutional:       General: He is not in acute distress.     Appearance: Normal appearance. He is not ill-appearing or toxic-appearing.   HENT:      Head: Normocephalic and atraumatic.      Mouth/Throat:      Mouth: Mucous membranes are moist.      Pharynx: Oropharynx is clear.   Cardiovascular:      Rate and Rhythm: Normal rate and regular rhythm.      Pulses: Normal pulses.      Heart sounds: Normal heart sounds. No murmur heard.     No friction rub. No gallop.   Pulmonary:      Effort: Pulmonary effort is normal. No respiratory distress.      Breath sounds: Normal breath sounds. No stridor. No wheezing or rales.   Abdominal:      General: Abdomen is flat. There is no distension.   Musculoskeletal:         General: No swelling.   Skin:     General: Skin is warm and dry.      Findings:  No bruising.   Neurological:      General: No focal deficit present.      Mental Status: He is alert and oriented to person, place, and time.   Psychiatric:         Mood and Affect: Mood normal.         Behavior: Behavior normal.          Significant Labs: BMP:   Recent Labs   Lab 08/16/23 2308 08/17/23  0614    112*    143   K 4.2 3.9    111*   CO2 22* 23   BUN 18 15   CREATININE 1.3 1.2   CALCIUM 9.1 8.5*   MG  --  1.8   , CBC   Recent Labs   Lab 08/16/23 2308 08/17/23  0614   WBC 7.94 7.05  7.05   HGB 14.5 13.4*  13.4*   HCT 42.5 39.6*  39.6*    230  230   , and Troponin   Recent Labs   Lab 08/16/23 2308 08/17/23  0114 08/17/23  0614   TROPONINI 0.066* 0.472* 1.603*       Significant Imaging:  Reviewed.

## 2023-08-17 NOTE — HPI
Naldo Barakat is a 58 y.o. male with a PMH of obesity, GERD, KAUSHAL who [place] to [facility] [place] of chest pain. Patient reports acute onset substernal chest pain described as a tightness/ pressure began while playing tennis on 8/16/23. No associated radiation but he did have some tingling to his bilateral forearms. Some associated SOB. He took 2 full-strength aspirin prior to arrival. He was given nitro in the ED which didn't improve his pain but made him feel strange. Endorses some relief with morphine to 2/10 but pain has returned to 5/10 at the time of my exam. He has gained some weight and has been eating unhealthy food over the past 5-6 months. Reports blood pressure reading was elevated to 140s a few weeks ago which was abnormal for him as BP was normal previously.    -170s. ECG with sinus bradycardia. ECHO pending. Trop .066->.472->1.6. Cr 1.2.  Hgb 13.4. Lipid Panel: HDL 45,

## 2023-08-17 NOTE — PLAN OF CARE
Problem: Adult Inpatient Plan of Care  Goal: Plan of Care Review  Outcome: Ongoing, Progressing  Goal: Patient-Specific Goal (Individualized)  Outcome: Ongoing, Progressing  Goal: Absence of Hospital-Acquired Illness or Injury  Outcome: Ongoing, Progressing  Goal: Optimal Comfort and Wellbeing  Outcome: Ongoing, Progressing  Goal: Readiness for Transition of Care  Outcome: Ongoing, Progressing     Problem: Adjustment to Illness (Acute Coronary Syndrome)  Goal: Optimal Adaptation to Illness  Outcome: Ongoing, Progressing     Problem: Dysrhythmia (Acute Coronary Syndrome)  Goal: Normalized Cardiac Rhythm  Outcome: Ongoing, Progressing     Problem: Cardiac-Related Pain (Acute Coronary Syndrome)  Goal: Absence of Cardiac-Related Pain  Outcome: Ongoing, Progressing     Problem: Hemodynamic Instability (Acute Coronary Syndrome)  Goal: Effective Cardiac Pump Function  Outcome: Ongoing, Progressing     Problem: Tissue Perfusion (Acute Coronary Syndrome)  Goal: Adequate Tissue Perfusion  Outcome: Ongoing, Progressing     Problem: Arrhythmia/Dysrhythmia (Cardiac Catheterization)  Goal: Stable Heart Rate and Rhythm  Outcome: Ongoing, Progressing     Problem: Bleeding (Cardiac Catheterization)  Goal: Absence of Bleeding  Outcome: Ongoing, Progressing     Problem: Contrast-Induced Injury Risk (Cardiac Catheterization)  Goal: Absence of Contrast-Induced Injury  Outcome: Ongoing, Progressing     Problem: Embolism (Cardiac Catheterization)  Goal: Absence of Embolism Signs and Symptoms  Outcome: Ongoing, Progressing     Problem: Ongoing Anesthesia/Sedation Effects (Cardiac Catheterization)  Goal: Anesthesia/Sedation Recovery  Outcome: Ongoing, Progressing     Problem: Pain (Cardiac Catheterization)  Goal: Acceptable Pain Control  Outcome: Ongoing, Progressing     Problem: Vascular Access Protection (Cardiac Catheterization)  Goal: Absence of Vascular Access Complication  Outcome: Ongoing, Progressing     Problem: Infection  Goal:  Absence of Infection Signs and Symptoms  Outcome: Ongoing, Progressing

## 2023-08-17 NOTE — CARE UPDATE
Chest pain    Cardiology was consulted about patient's chest pain.    I went to see the patient. 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  No prior diagnosis of HTN. He was playing tennis when started to get CP, central with radiation to both arms.It is constant for several hours.  BP upon arrival 192/121 and 207/110. EKG unremarkable.  Trop 0.06 -> 0.47  I did echo with contrast (BMI 35.5), EF 60-65% with no regional WMA.     Initial impression:  -HTN emergency  -Type 2 MI    Recs  -treat HTN with goal for 25% reduction in 24 hours with eventual goal for SBP<130  -interested in digital HTN program, please order upon d/c  -will buy BP cuff himself once discharged, we also talked about weight reduction and low Na  diet, he is very motivated to lose weight  -prescribe BP meds upon d/c since it is a new HTN diagnosis  -trend trop  -check lipid panel and hba1c  -formal echo in am    Formal note to come in am

## 2023-08-17 NOTE — ASSESSMENT & PLAN NOTE
Pt is a 58 year old male with no significant cardiac history presenting with substernal chest pain/pressure. ECG without ST/T wave changes. Elevated troponin 0.06 > 0.47 > 1.603. BNP wnl. Pt loaded himself with ASA prior to presenting to hospital. Chest pressure with relief after sublingual nitroglycerin x3 this AM. Cardiology consulted for chest pain.     RECOMMENDATIONS:  -continue ACS protocol  -pt started on ASA, statin, plavix  -heparin bolus and gtt   -formal echo today  -interventional cardiology consult for LHC +/- PCI

## 2023-08-17 NOTE — PLAN OF CARE
Recommendations     1.) Recommend when medically feasible to ADAT, with goal of Cardiac diet, fluid per MD.      2.) If PO intake <50%, recommend Boost Plus TID to help optimize PRO/Kcal intake.      3.) RD to monitor wt, PO intake, skin, labs.        Goals: to meet % of EEN/EPN by next RD f/u  Nutrition Goal Status: new  Communication of RD Recs:  (POC)

## 2023-08-17 NOTE — ASSESSMENT & PLAN NOTE
--C +/- PCI, patient is a ISABELLA candidate  - Anti-platelet Therapy: ASA / Plavix   - Access: Right radial  - Catheters: Ron  - Creatinine/CrCl: 1.2  - Allergies: No shellfish / Iodine allergy  - Pre-Hydration: NS  - Pre-Op Med: Bendaryl 50mg pO   - All patient's questions were answered.  -The risks, benefits and alternatives of the procedure were explained to the patient.   -The risks of coronary angiography include but are not limited to: bleeding, infection, heart rhythm abnormalities, allergic reactions, kidney injury and potential need for dialysis, stroke and death.   - Should stenting be indicated, the patient has agreed to dual anti-platelet therapy for 1-consecutive year with a drug-eluting stent and a minimum of 1-month with the use of a bare metal stent  - Additionally, pt is aware that non-compliance is likely to result in stent clotting with heart attack, heart failure, and/or death  -The risks of moderate sedation include hypotension, respiratory depression, arrhythmias, bronchospasm, and death.   - Informed consent was obtained and the  patient is agreeable to proceed with the procedure.

## 2023-08-17 NOTE — CONSULTS
"Unable to see pt today in hospital. Information packet sent to patient re: Phase 2 cardiac rehab, which includes "Your Guide to Living with Heart Disease".  Letter was also sent to patient.    Scottie Spencer RN  Cardiac Rehab Nurse   "

## 2023-08-17 NOTE — ASSESSMENT & PLAN NOTE
- BP uncontrolled on admit, possibly cause of chest pain and elevated trop  - start losartan 25mg daily and lopressor 25mg BID  - PRN hydralazine

## 2023-08-17 NOTE — PLAN OF CARE
[facility] - Cardiology Stepdown  Initial Discharge Assessment       Primary Care Provider: Anthony Wyman MD    Admission Diagnosis: Non-ST elevation myocardial infarction (NSTEMI) [I21.4]  NSTEMI (non-ST elevation myocardial infarction) [I21.4]  NSTEMI (non-ST elevated myocardial infarction) [I21.4]  Chest pain [R07.9]    Admission Date: 8/16/2023  Expected Discharge Date: 8/18/2023    Transition of Care Barriers: None    Payor: BLUE CROSS [employer]EFIT / Plan: [employer] EM[employer]OY[employer] [place]UE [place]OSS [place] / Product Type: Self Funded /     Extended Emergency Contact Information  Primary Emergency Contact: Fransisca Barakat   [facility]  Home Phone: [phone]  Relation: Spouse    Discharge Plan A: Home with family  Discharge Plan B: Home      [facility]  [address]  [place] [address]  Phone: [phone] Fax: [phone]    [facility]/pharmacy #[id number] - [place] - [address]  [address] [place] [address]  Phone: [phone] Fax: [phone]      Initial Assessment (most recent)       Adult Discharge Assessment - 08/17/23 1322          Discharge Assessment    Assessment Type Discharge Planning Assessment     Confirmed/corrected address, phone number and insurance Yes     Confirmed Demographics Correct on Facesheet     Source of Information patient;family     Communicated ELIZABETH with patient/caregiver Date not available/Unable to determine     Reason For Admission NSTEMI     People in Home spouse;child(kemi), dependent;other relative(s)     Facility Arrived From: home     Do you expect to return to your current living situation? Yes     Do you have help at home or someone to help you manage your care at home? Yes     Who are your caregiver(s) and their phone number(s)? Fransisca Barakat (spouse) [phone]     Prior to hospitilization cognitive status: Alert/Oriented     Current cognitive status: Alert/Oriented     Walking or Climbing Stairs --   none     Dressing/Bathing --   none    Equipment Currently Used at Home none     Readmission within 30 days? No     Patient currently being followed by outpatient case management? No     Do you currently have service(s) that help you manage your care at home? No     Do you take prescription medications? Yes     Do you have prescription coverage? Yes     Coverage BCBS     Do you have any problems affording any of your prescribed medications? No     Is the patient taking medications as prescribed? yes     Who is going to help you get home at discharge? Fransisca Barakat (spouse) [phone]     How do you get to doctors appointments? car, drives self;family or friend will provide     Are you on dialysis? No     Do you take coumadin? No     DME Needed Upon Discharge  none     Discharge Plan discussed with: Spouse/sig other;Patient     Name(s) and Number(s) Fransisca Barakat (spouse) [phone]     Transition of Care Barriers None     Discharge Plan A Home with family     Discharge Plan B Home        Physical Activity    On average, how many days per week do you engage in moderate to strenuous exercise (like a brisk walk)? 1 day     On average, how many minutes do you engage in exercise at this level? 60 min        Financial Resource Strain    How hard is it for you to pay for the very basics like food, housing, medical care, and heating? Not hard at all        Housing Stability    In the last 12 months, was there a time when you were not able to pay the mortgage or rent on time? No     In the last 12 months, how many places have you lived? 1     In the last 12 months, was there a time when you did not have a steady place to sleep or slept in a shelter (including now)? No        Transportation Needs    In the past 12 months, has lack of transportation kept you from medical appointments or from getting medications? No     In the past 12 months, has lack of transportation kept you from meetings, work, or from getting things  needed for daily living? No        Food Insecurity    Within the past 12 months, you worried that your food would run out before you got the money to buy more. Never true     Within the past 12 months, the food you bought just didn't last and you didn't have money to get more. Never true        Stress    Do you feel stress - tense, restless, nervous, or anxious, or unable to sleep at night because your mind is troubled all the time - these days? Not at all        Social Connections    In a typical week, how many times do you talk on the phone with family, friends, or neighbors? More than three times a week     How often do you get together with friends or relatives? More than three times a week     How often do you attend [employer] or Pentecostal services? More than 4 times per year     Do you belong to any clubs or organizations such as [employer] groups, unions, fraternal or athletic groups, or school groups? Yes     How often do you attend meetings of the clubs or organizations you belong to? More than 4 times per year     Are you , , , , never , or living with a partner?         Alcohol Use    Q1: How often do you have a drink containing alcohol? Never     Q2: How many drinks containing alcohol do you have on a typical day when you are drinking? Patient does not drink     Q3: How often do you have six or more drinks on one occasion? Never        OTHER    Name(s) of People in Home Fransisca Barakat (spouse) [phone] and 3 children and father in law adn niece and brother.                      CM met with patient and Farnsisca Barakat (spouse) [phone] at the bedside and discussed the discharge process with them . Gave them the discharge booklet and placed our contact numbers on the white board in the room. Had no questions or concerns at this time. Patient verified his name, , insurance and PCP. He stted he lives in a 3 story house with other family members and V . 8-10  steps to get to the second level with good working hand rails. Also has an working elevator in the house. Patient reports a [facility]PAP machine thru [facility] and stated he is not on coumadin and not dialysis. Patient request BSD. Will continue to monitor for discharge needs.     Sheryl Sow RN CM   [phone]

## 2023-08-17 NOTE — SUBJECTIVE & OBJECTIVE
Interval History: Continued chest pressure over    Review of Systems   Constitutional:  Negative for fatigue and fever.   Cardiovascular:  Positive for chest pain.     Objective:     Vital Signs (Most Recent):  Temp: 98.7 °F (37.1 °C) (08/17/23 1111)  Pulse: 68 (08/17/23 1132)  Resp: 18 (08/17/23 1111)  BP: (!) 143/91 (08/17/23 1111)  SpO2: 96 % (08/17/23 1111) Vital Signs (24h Range):  Temp:  [97.8 °F (36.6 °C)-98.7 °F (37.1 °C)] 98.7 °F (37.1 °C)  Pulse:  [54-87] 68  Resp:  [12-19] 18  SpO2:  [95 %-99 %] 96 %  BP: (128-192)/() 143/91     Weight: 115.2 kg (254 lb)  Body mass index is 35.43 kg/m².  No intake or output data in the 24 hours ending 08/17/23 1359      Physical Exam  Constitutional:       General: He is not in acute distress.     Appearance: He is not ill-appearing.   Cardiovascular:      Rate and Rhythm: Normal rate and regular rhythm.      Pulses: Normal pulses.      Heart sounds: Normal heart sounds. No murmur heard.  Pulmonary:      Effort: Pulmonary effort is normal. No respiratory distress.      Breath sounds: Normal breath sounds.   Skin:     Capillary Refill: Capillary refill takes less than 2 seconds.      Coloration: Skin is not jaundiced.   Neurological:      General: No focal deficit present.      Mental Status: He is oriented to person, place, and time.             Significant Labs: All pertinent labs within the past 24 hours have been reviewed.  CBC:   Recent Labs   Lab 08/16/23 2308 08/17/23 0614   WBC 7.94 7.05  7.05   HGB 14.5 13.4*  13.4*   HCT 42.5 39.6*  39.6*    230  230     CMP:   Recent Labs   Lab 08/16/23 2308 08/17/23  0614    143   K 4.2 3.9    111*   CO2 22* 23    112*   BUN 18 15   CREATININE 1.3 1.2   CALCIUM 9.1 8.5*   PROT 7.3  --    ALBUMIN 4.1  --    BILITOT 0.4  --    ALKPHOS 68  --    AST 28  --    ALT 32  --    ANIONGAP 14 9     Troponin:   Recent Labs   Lab 08/17/23  0114 08/17/23  0614 08/17/23  1119   TROPONINI 0.472* 1.603*  1.919*       Significant Imaging: I have reviewed all pertinent imaging results/findings within the past 24 hours.

## 2023-08-17 NOTE — ASSESSMENT & PLAN NOTE
- possibly type 2 demand ischemic from uncontrolled HTN, though CP in the setting of elevated trop raises concern for type 1 MI  - trop 0.066> trend  - EKG without acute ST/T wave changes  - started on heparin gtt in the ED, will continue   - hold on plavix pending cardiology eval/ repeat trop   - start ASA and HI statin  - BP control as below  - check TTE  - PRN nitro  - monitor tele

## 2023-08-17 NOTE — CONSULTS
[facility] - Cardiology Stepdown  Interventional Cardiology  Consult Note    Patient Name: Naldo Barakat  MRN: [id number]  Admission Date: 8/16/2023  Hospital Length of Stay: 0 days  Code Status: Full Code   Attending Provider: Dk Shaffer MD  Consulting Provider: Nick Hernandez MD  Primary Care Physician: Anthony Wyman MD  Principal Problem:NSTEMI (non-ST elevated myocardial infarction)    Patient information was obtained from patient and ER records.     Inpatient consult to Interventional Cardiology  Consult performed by: Nick Hernandez MD  Consult ordered by: Francisco Kwan MD        Subjective:     Chief Complaint:  Chest Pain      HPI:  Naldo Barakat is a 58 y.o. male with a PMH of obesity, GERD, KAUSHAL who presents to [facility] for evaluation of chest pain. Patient reports acute onset substernal chest pain described as a tightness/ pressure began while playing tennis on 8/16/23. No associated radiation but he did have some tingling to his bilateral forearms. Some associated SOB. He took 2 full-strength aspirin prior to arrival. He was given nitro in the ED which didn't improve his pain but made him feel strange. Endorses some relief with morphine to 2/10 but pain has returned to 5/10 at the time of my exam. He has gained some weight and has been eating unhealthy food over the past 5-6 months. Reports blood pressure reading was elevated to 140s a few weeks ago which was abnormal for him as BP was normal previously.    -170s. ECG with sinus bradycardia. ECHO pending. Trop .066->.472->1.6. Cr 1.2.  Hgb 13.4. Lipid Panel: HDL 45,        Past Medical History:   Diagnosis Date    Allergy     Eczema        Past Surgical History:   Procedure Laterality Date    COLONOSCOPY N/A 2/15/2016    Procedure: COLONOSCOPY;  Surgeon: STONE Sanchez MD;  Location: [facility] ([address] [place] [address]);  Service: Endoscopy;  Laterality: N/A;  PM Prep    HERNIA REPAIR         Review of patient's allergies indicates:   Allergen Reactions     Erythromycin     Macrolide antibiotics        Facility-Administered Medications Prior to Admission   Medication    acetaminophen tablet 650 mg    albuterol inhaler 2 puff    cefTRIAXone injection 1 g    EPINEPHrine (EPIPEN) 0.3 mg/0.3 mL pen injection 0.3 mg    ondansetron disintegrating tablet 4 mg    predniSONE tablet 40 mg     PTA Medications   Medication Sig    amoxicillin (AMOXIL) 875 MG tablet Take 1 tablet (875 mg total) by mouth 2 (two) times daily. (Patient not taking: Reported on 4/6/2023)    azelastine (ASTELIN) 137 mcg (0.1 %) nasal spray 2 sprays (274 mcg total) by Nasal route 2 (two) times daily.    famotidine (PEPCID) 20 MG tablet Take 1 tablet (20 mg total) by mouth nightly as needed for Heartburn.    guaiFENesin-codeine 100-10 mg/5 ml (TUSSI-ORGANIDIN NR)  mg/5 mL syrup Take 5 mLs by mouth every 4 (four) hours as needed for Cough.    ketoconazole (NIZORAL) 2 % cream Apply topically once daily. (Patient not taking: Reported on 4/6/2023)    predniSONE (DELTASONE) 20 MG tablet Take 3 tablets by mouth daily for 3 days, then 2 tablets  daily for 3 days, then 1 tablets  daily for 3 days     Family History       Problem Relation (Age of Onset)    Breast cancer Mother    Cancer Mother, Father    Lung disease Sister    No Known Problems Brother, Sister          Tobacco Use    Smoking status: Never    Smokeless tobacco: Never   Substance and Sexual Activity    Alcohol use: Yes     Comment: limited    Drug use: Not on file    Sexual activity: Not on file     Review of Systems   Constitutional: Negative for fever and malaise/fatigue.   Eyes:  Negative for blurred vision and pain.   Cardiovascular:  Positive for chest pain. Negative for dyspnea on exertion, leg swelling, orthopnea, palpitations and paroxysmal nocturnal dyspnea.   Respiratory:  Negative for cough, shortness of breath, sputum production and wheezing.    Hematologic/Lymphatic: Negative for adenopathy and bleeding problem.   Skin:  Negative  for rash.   Musculoskeletal:  Negative for back pain and neck pain.   Gastrointestinal:  Negative for abdominal pain, constipation, diarrhea, nausea and vomiting.   Genitourinary:  Negative for dysuria.   Neurological:  Negative for dizziness, headaches, light-headedness and weakness.     Objective:     Vital Signs (Most Recent):  Temp: 97.8 °F (36.6 °C) (08/17/23 0727)  Pulse: 84 (08/17/23 0832)  Resp: 18 (08/17/23 0832)  BP: 128/82 (08/17/23 0832)  SpO2: 96 % (08/17/23 0727) Vital Signs (24h Range):  Temp:  [97.8 °F (36.6 °C)-98.7 °F (37.1 °C)] 97.8 °F (36.6 °C)  Pulse:  [54-87] 84  Resp:  [12-19] 18  SpO2:  [95 %-99 %] 96 %  BP: (128-192)/() 128/82     Weight: 115.6 kg (254 lb 13.6 oz)  Body mass index is 35.54 kg/m².    SpO2: 96 %       No intake or output data in the 24 hours ending 08/17/23 0853    Lines/Drains/Airways       Peripheral Intravenous Line  Duration                  Peripheral IV - Single Lumen 08/16/23 2309 20 G Left Antecubital <1 day         Peripheral IV - Single Lumen 08/17/23 0107 22 G Anterior;Right Forearm <1 day                     Physical Exam  Constitutional:       General: He is not in acute distress.     Appearance: Normal appearance. He is not ill-appearing, toxic-appearing or diaphoretic.   HENT:      Head: Normocephalic and atraumatic.      Nose: Nose normal.   Eyes:      Extraocular Movements: Extraocular movements intact.      Pupils: Pupils are equal, round, and reactive to light.   Cardiovascular:      Rate and Rhythm: Normal rate and regular rhythm.      Heart sounds: No murmur heard.     No friction rub. No gallop.   Pulmonary:      Effort: Pulmonary effort is normal. No respiratory distress.      Breath sounds: Normal breath sounds. No wheezing or rales.   Abdominal:      General: Abdomen is flat. There is no distension.      Palpations: Abdomen is soft. There is no mass.      Tenderness: There is no abdominal tenderness.   Musculoskeletal:         General: No  swelling. Normal range of motion.      Cervical back: Normal range of motion. No rigidity.      Right lower leg: No edema.      Left lower leg: No edema.   Skin:     General: Skin is warm and dry.      Coloration: Skin is not jaundiced.      Findings: No bruising.   Neurological:      General: No focal deficit present.      Mental Status: He is alert and oriented to person, place, and time.      Cranial Nerves: No cranial nerve deficit.      Motor: No weakness.            Significant Labs: BMP:   Recent Labs   Lab 08/16/23 2308 08/17/23 0614    112*    143   K 4.2 3.9    111*   CO2 22* 23   BUN 18 15   CREATININE 1.3 1.2   CALCIUM 9.1 8.5*   MG  --  1.8    and CBC   Recent Labs   Lab 08/16/23 2308 08/17/23 0614   WBC 7.94 7.05  7.05   HGB 14.5 13.4*  13.4*   HCT 42.5 39.6*  39.6*    230  230       Significant Imaging: Reviewed     Assessment and Plan:     Cardiac/Vascular  * NSTEMI (non-ST elevated myocardial infarction)  --C +/- PCI, patient is a ISABELLA candidate  - Anti-platelet Therapy: ASA / Plavix   - Access: Right radial  - Catheters: Ron  - Creatinine/CrCl: 1.2  - Allergies: No shellfish / Iodine allergy  - Pre-Hydration: NS  - Pre-Op Med: Bendaryl 50mg pO   - All patient's questions were answered.  -The risks, benefits and alternatives of the procedure were explained to the patient.   -The risks of coronary angiography include but are not limited to: bleeding, infection, heart rhythm abnormalities, allergic reactions, kidney injury and potential need for dialysis, stroke and death.   - Should stenting be indicated, the patient has agreed to dual anti-platelet therapy for 1-consecutive year with a drug-eluting stent and a minimum of 1-month with the use of a bare metal stent  - Additionally, pt is aware that non-compliance is likely to result in stent clotting with heart attack, heart failure, and/or death  -The risks of moderate sedation include hypotension, respiratory  depression, arrhythmias, bronchospasm, and death.   - Informed consent was obtained and the  patient is agreeable to proceed with the procedure.          VTE Risk Mitigation (From admission, onward)           Ordered     heparin 25,000 units in dextrose 5% (100 units/ml) IV bolus from bag - ADDITIONAL PRN BOLUS - 60 units/kg (max bolus 4000 units)  As needed (PRN)        Question:  Heparin Infusion Adjustment (DO NOT MODIFY ANSWER)  Answer:  \\[email or web address]\epic\Images\Pharmacy\HeparinInfusions\heparin LOW INTENSITY nomogram for [facility] [id number]    08/17/23 0730     heparin 25,000 units in dextrose 5% (100 units/ml) IV bolus from bag - ADDITIONAL PRN BOLUS - 30 units/kg (max bolus 4000 units)  As needed (PRN)        Question:  Heparin Infusion Adjustment (DO NOT MODIFY ANSWER)  Answer:  \\[email or web address]\epic\Images\Pharmacy\HeparinInfusions\heparin LOW INTENSITY nomogram for [facility] [id number]    08/17/23 0730     heparin 25,000 units in dextrose 5% 250 mL (100 units/mL) infusion LOW INTENSITY nomogram - [facility]  Continuous        Question Answer Comment   Heparin Infusion Adjustment (DO NOT MODIFY ANSWER) \\[email or web address]\epic\Images\Pharmacy\HeparinInfusions\heparin LOW INTENSITY nomogram for [facility] [id number].pdf    Begin at (in units/kg/hr) 12        08/17/23 0730     Place sequential compression device  Until discontinued         08/17/23 0117     IP VTE HIGH RISK PATIENT  Once         08/17/23 0120     Reason for No Pharmacological VTE Prophylaxis  Once        Question:  Reasons:  Answer:  Physician Provided (leave comment)  Comment:  on heparin gtt    08/17/23 0120     Place sequential compression device  Until discontinued         08/17/23 0117                    Thank you for your consult. I will follow-up with patient. Please contact us if you have any additional questions.    Nick Hernandez MD  Interventional Cardiology   [facility] - Cardiology Stepdown

## 2023-08-17 NOTE — SUBJECTIVE & OBJECTIVE
Past Medical History:   Diagnosis Date    Allergy     Eczema        Past Surgical History:   Procedure Laterality Date    COLONOSCOPY N/A 2/15/2016    Procedure: COLONOSCOPY;  Surgeon: STONE Sanchez MD;  Location: [facility] ([address] [place] [address]);  Service: Endoscopy;  Laterality: N/A;  PM Prep    HERNIA REPAIR         Review of patient's allergies indicates:   Allergen Reactions    Erythromycin     Macrolide antibiotics        Current Facility-Administered Medications on File Prior to Encounter   Medication    acetaminophen tablet 650 mg    albuterol inhaler 2 puff    cefTRIAXone injection 1 g    EPINEPHrine (EPIPEN) 0.3 mg/0.3 mL pen injection 0.3 mg    ondansetron disintegrating tablet 4 mg    predniSONE tablet 40 mg     Current Outpatient Medications on File Prior to Encounter   Medication Sig    amoxicillin (AMOXIL) 875 MG tablet Take 1 tablet (875 mg total) by mouth 2 (two) times daily. (Patient not taking: Reported on 4/6/2023)    azelastine (ASTELIN) 137 mcg (0.1 %) nasal spray 2 sprays (274 mcg total) by Nasal route 2 (two) times daily.    famotidine (PEPCID) 20 MG tablet Take 1 tablet (20 mg total) by mouth nightly as needed for Heartburn.    guaiFENesin-codeine 100-10 mg/5 ml (TUSSI-ORGANIDIN NR)  mg/5 mL syrup Take 5 mLs by mouth every 4 (four) hours as needed for Cough.    ketoconazole (NIZORAL) 2 % cream Apply topically once daily. (Patient not taking: Reported on 4/6/2023)    predniSONE (DELTASONE) 20 MG tablet Take 3 tablets by mouth daily for 3 days, then 2 tablets  daily for 3 days, then 1 tablets  daily for 3 days     Family History       Problem Relation (Age of Onset)    Breast cancer Mother    Cancer Mother, Father    Lung disease Sister    No Known Problems Brother, Sister          Tobacco Use    Smoking status: Never    Smokeless tobacco: Never   Substance and Sexual Activity    Alcohol use: Yes     Comment: limited    Drug use: Not on file    Sexual activity: Not on file     Review of Systems    Constitutional:  Negative for activity change, chills and fever.   HENT:  Negative for congestion, trouble swallowing and voice change.    Eyes:  Negative for photophobia and visual disturbance.   Respiratory:  Positive for chest tightness and shortness of breath. Negative for wheezing.    Cardiovascular:  Positive for chest pain. Negative for palpitations and leg swelling.   Gastrointestinal:  Negative for abdominal pain, constipation, diarrhea, nausea and vomiting.   Genitourinary:  Negative for dysuria, frequency, hematuria and urgency.   Musculoskeletal:  Negative for arthralgias, back pain and gait problem.   Skin:  Negative for color change and rash.   Neurological:  Negative for dizziness, syncope, weakness, light-headedness, numbness and headaches.   Psychiatric/Behavioral:  Negative for agitation and confusion. The patient is not nervous/anxious.      Objective:     Vital Signs (Most Recent):  Temp: 98.1 °F (36.7 °C) (08/16/23 2155)  Pulse: 75 (08/17/23 0130)  Resp: 18 (08/17/23 0130)  BP: (!) 192/121 (08/17/23 0130)  SpO2: 98 % (08/17/23 0130) Vital Signs (24h Range):  Temp:  [98.1 °F (36.7 °C)] 98.1 °F (36.7 °C)  Pulse:  [75-87] 75  Resp:  [16-18] 18  SpO2:  [97 %-98 %] 98 %  BP: (179-192)/(107-133) 192/121     Weight: 115.7 kg (255 lb)  Body mass index is 35.57 kg/m².     Physical Exam  Vitals and nursing note reviewed.   Constitutional:       General: He is not in acute distress.     Appearance: He is well-developed. He is obese.   HENT:      Head: Normocephalic and atraumatic.      Mouth/Throat:      Pharynx: No oropharyngeal exudate.   Eyes:      General: No scleral icterus.     Conjunctiva/sclera: Conjunctivae normal.   Cardiovascular:      Rate and Rhythm: Normal rate and regular rhythm.      Heart sounds: Normal heart sounds.   Pulmonary:      Effort: Pulmonary effort is normal. No respiratory distress.      Breath sounds: Normal breath sounds. No wheezing.   Abdominal:      General: Bowel sounds  "are normal. There is no distension.      Palpations: Abdomen is soft.      Tenderness: There is no abdominal tenderness.   Musculoskeletal:         General: No tenderness. Normal range of motion.      Cervical back: Normal range of motion and neck supple.      Right lower leg: No edema.      Left lower leg: No edema.   Lymphadenopathy:      Cervical: No cervical adenopathy.   Skin:     General: Skin is warm and dry.      Capillary Refill: Capillary refill takes less than 2 seconds.      Findings: No rash.   Neurological:      Mental Status: He is alert and oriented to person, place, and time.      Cranial Nerves: No cranial nerve deficit.      Sensory: No sensory deficit.      Coordination: Coordination normal.   Psychiatric:         Behavior: Behavior normal.         Thought Content: Thought content normal.         Judgment: Judgment normal.                Significant Labs: All pertinent labs within the past 24 hours have been reviewed.  BMP:   Recent Labs   Lab 08/16/23  2308         K 4.2      CO2 22*   BUN 18   CREATININE 1.3   CALCIUM 9.1     CBC:   Recent Labs   Lab 08/16/23  2308   WBC 7.94   HGB 14.5   HCT 42.5          Significant Imaging: I have reviewed all pertinent imaging results/findings within the past 24 hours.  X-Ray Chest PA And Lateral  Narrative: EXAMINATION:  XR CHEST PA AND LATERAL    CLINICAL HISTORY:  Provided history is "  Chest pain, unspecified".    TECHNIQUE:  Frontal and lateral views of the chest were performed.    COMPARISON:  08/01/2022.    FINDINGS:  Cardiac silhouette is not enlarged. Lung volumes are relatively low, accentuating basilar markings.  No confluent area of consolidation.  No sizable pleural effusion.  No pneumothorax.  Impression: No acute cardiopulmonary finding.    Electronically signed by: Yaniv Pearson MD  Date:    08/17/2023  Time:    00:23      "

## 2023-08-17 NOTE — ASSESSMENT & PLAN NOTE
Suspect Type 1 NSTEMI  - trop 0.066 on admission, trend to peak  - EKG without acute ST/T wave changes  - started on heparin gtt in the ED, will continue   - Continue plavix  - start ASA and HI statin  - BP control as below  - check TTE  - PRN nitro  - monitor tele   -Pending [facility], appreciate cards recs

## 2023-08-17 NOTE — HPI
"Mr. Naldo Barakat is a 58 year old male with hx of GERD, KAUSHAL, no significant cardiac history who presented to the ED yesterday with substernal chest pain/pressure described as something sitting on his chest. Associated tingling sensation to bilateral forearms. Pt was playing tennis at the time of onset without relief after rest which prompted him to come for evaluation. He took two full strength aspirin before coming to the hospital. In the ED, elevated /121>207/110, HR 87, RR 18 with SpO2 97% RA, afebrile. No prior history of HTN. CXR and ECG unremarkable. Elevated troponin 0.066 > 0.472 > 1.603. Given nitroglycerin x1 without alleviation of chest discomfort. Received IV morphine with some relief. Bedside echo with contrast showed EF 60-65% with no regional wall motion abnormalities. Started on losartan and lopressor for blood pressure control. Pt received IV hydralazine this AM as well. Pt given sublingual nitroglycerin x3 with alleviation of chest pressure as pt states he "no longer feels like elephant is sitting on his chest." Blood pressure improved to 128/82 after administration with above medications. Cardiology was consulted for chest pain with concerns of NSTEMI.       "

## 2023-08-17 NOTE — CONSULTS
"[facility] - [facility] Stepdown  Cardiology  Consult Note    Patient Name: Naldo Barakat  MRN: [id number]  Admission Date: 8/16/2023  Hospital Length of Stay: 0 days  Code Status: Full Code   Attending Provider: Dk Shaffer MD   Consulting Provider: Monica Subramanian MD  Primary Care Physician: Anthony Wyman MD  Principal Problem:NSTEMI (non-ST elevated myocardial infarction)    Patient information was obtained from patient, spouse/SO and ER records.     Inpatient consult to Cardiology  Consult performed by: Monica Subramanian MD  Consult ordered by: Sully Strauss PA-C        Subjective:     HPI:   Mr. Naldo Barakat is a 58 year old male with hx of GERD, KAUSHAL, no significant cardiac history who presented to the ED yesterday with substernal chest pain/pressure described as something sitting on his chest. Associated tingling sensation to bilateral forearms. Pt was playing tennis at the time of onset without relief after rest which prompted him to come for evaluation. He took two full strength aspirin before coming to the hospital. In the ED, elevated /121>207/110, HR 87, RR 18 with SpO2 97% RA, afebrile. No prior history of HTN. CXR and ECG unremarkable. Elevated troponin 0.066 > 0.472 > 1.603. Given nitroglycerin x1 without alleviation of chest discomfort. Received IV morphine with some relief. Bedside echo with contrast showed EF 60-65% with no regional wall motion abnormalities. Started on losartan and lopressor for blood pressure control. Pt received IV hydralazine this AM as well. Pt given sublingual nitroglycerin x3 with alleviation of chest pressure as pt states he "no longer feels like elephant is sitting on his chest." Blood pressure improved to 128/82 after administration with above medications. Cardiology was consulted for chest pain with concerns of NSTEMI.           Past Medical History:   Diagnosis Date    Allergy     Eczema        Past Surgical History:   Procedure Laterality Date    " COLONOSCOPY N/A 2/15/2016    Procedure: COLONOSCOPY;  Surgeon: STONE Sanchez MD;  Location: [facility] ([address] [place] [address]);  Service: Endoscopy;  Laterality: N/A;  PM Prep    HERNIA REPAIR         Review of patient's allergies indicates:   Allergen Reactions    Erythromycin     Macrolide antibiotics        No current facility-administered medications on file prior to encounter.     Current Outpatient Medications on File Prior to Encounter   Medication Sig    amoxicillin (AMOXIL) 875 MG tablet Take 1 tablet (875 mg total) by mouth 2 (two) times daily. (Patient not taking: Reported on 4/6/2023)    azelastine (ASTELIN) 137 mcg (0.1 %) nasal spray 2 sprays (274 mcg total) by Nasal route 2 (two) times daily.    famotidine (PEPCID) 20 MG tablet Take 1 tablet (20 mg total) by mouth nightly as needed for Heartburn.    guaiFENesin-codeine 100-10 mg/5 ml (TUSSI-ORGANIDIN NR)  mg/5 mL syrup Take 5 mLs by mouth every 4 (four) hours as needed for Cough.    ketoconazole (NIZORAL) 2 % cream Apply topically once daily. (Patient not taking: Reported on 4/6/2023)    predniSONE (DELTASONE) 20 MG tablet Take 3 tablets by mouth daily for 3 days, then 2 tablets  daily for 3 days, then 1 tablets  daily for 3 days     Family History       Problem Relation (Age of Onset)    Breast cancer Mother    Cancer Mother, Father    Lung disease Sister    No Known Problems Brother, Sister          Tobacco Use    Smoking status: Never    Smokeless tobacco: Never   Substance and Sexual Activity    Alcohol use: Yes     Comment: limited    Drug use: Not on file    Sexual activity: Not on file     Review of Systems   Constitutional: Negative for chills, fever and malaise/fatigue.   Eyes:  Negative for blurred vision and double vision.   Cardiovascular:  Positive for chest pain (pressure/tightness). Negative for irregular heartbeat, leg swelling and palpitations.   Respiratory:  Negative for shortness of breath and wheezing.    Gastrointestinal:   Negative for abdominal pain, constipation, diarrhea, nausea and vomiting.   Neurological:  Negative for headaches, light-headedness and weakness.   All other systems reviewed and are negative.    Objective:     Vital Signs (Most Recent):  Temp: 98.7 °F (37.1 °C) (08/17/23 1111)  Pulse: 68 (08/17/23 1132)  Resp: 18 (08/17/23 1111)  BP: (!) 143/91 (08/17/23 1111)  SpO2: 96 % (08/17/23 1111) Vital Signs (24h Range):  Temp:  [97.8 °F (36.6 °C)-98.7 °F (37.1 °C)] 98.7 °F (37.1 °C)  Pulse:  [54-87] 68  Resp:  [12-19] 18  SpO2:  [95 %-99 %] 96 %  BP: (128-192)/() 143/91     Weight: 115.2 kg (254 lb)  Body mass index is 35.43 kg/m².    SpO2: 96 %       No intake or output data in the 24 hours ending 08/17/23 1156    Lines/Drains/Airways       Peripheral Intravenous Line  Duration                  Peripheral IV - Single Lumen 08/16/23 2309 20 G Left Antecubital <1 day         Peripheral IV - Single Lumen 08/17/23 0107 22 G Anterior;Right Forearm <1 day                     Physical Exam  Vitals and nursing note reviewed.   Constitutional:       General: He is not in acute distress.     Appearance: Normal appearance. He is not ill-appearing or toxic-appearing.   HENT:      Head: Normocephalic and atraumatic.      Mouth/Throat:      Mouth: Mucous membranes are moist.      Pharynx: Oropharynx is clear.   Cardiovascular:      Rate and Rhythm: Normal rate and regular rhythm.      Pulses: Normal pulses.      Heart sounds: Normal heart sounds. No murmur heard.     No friction rub. No gallop.   Pulmonary:      Effort: Pulmonary effort is normal. No respiratory distress.      Breath sounds: Normal breath sounds. No stridor. No wheezing or rales.   Abdominal:      General: Abdomen is flat. There is no distension.   Musculoskeletal:         General: No swelling.   Skin:     General: Skin is warm and dry.      Findings: No bruising.   Neurological:      General: No focal deficit present.      Mental Status: He is alert and  oriented to person, place, and time.   Psychiatric:         Mood and Affect: Mood normal.         Behavior: Behavior normal.          Significant Labs: BMP:   Recent Labs   Lab 08/16/23  2308 08/17/23  0614    112*    143   K 4.2 3.9    111*   CO2 22* 23   BUN 18 15   CREATININE 1.3 1.2   CALCIUM 9.1 8.5*   MG  --  1.8   , CBC   Recent Labs   Lab 08/16/23  2308 08/17/23  0614   WBC 7.94 7.05  7.05   HGB 14.5 13.4*  13.4*   HCT 42.5 39.6*  39.6*    230  230   , and Troponin   Recent Labs   Lab 08/16/23  2308 08/17/23  0114 08/17/23  0614   TROPONINI 0.066* 0.472* 1.603*       Significant Imaging:  Reviewed.    Assessment and Plan:     * NSTEMI (non-ST elevated myocardial infarction)  Pt is a 58 year old male with no significant cardiac history presenting with substernal chest pain/pressure. ECG without ST/T wave changes. Elevated troponin 0.06 > 0.47 > 1.603. BNP wnl. Pt loaded himself with ASA prior to presenting to hospital. Chest pressure with relief after sublingual nitroglycerin x3 this AM. Cardiology consulted for chest pain.     RECOMMENDATIONS:  -continue ACS protocol  -pt started on ASA, statin, plavix  -heparin bolus and gtt   -formal echo today  -interventional cardiology consult for C +/- PCI     Hypertensive urgency  Pt with no prior history of HTN presenting with elevated /121 > 207/110 in the ED. Started on losartan 50mg and lopressor 25 BID for blood pressure control. Received IV hydralazine 5mg and sublingual nitroglycerin x3 this AM with improved blood pressure 128/82.     RECOMMENDATIONS:  -treat HTN with goal for 25% reduction in 24 hours with eventual goal for SBP<130  -interested in digital HTN program, please order upon d/c  -will buy BP cuff himself once discharged, we also talked about weight reduction and low Na diet, he is very motivated to lose weight  -prescribe BP meds upon d/c since it is a new HTN diagnosis  -check lipid panel and  hba1c          VTE Risk Mitigation (From admission, onward)         Ordered     heparin 25,000 units in dextrose 5% (100 units/ml) IV bolus from bag - ADDITIONAL PRN BOLUS - 60 units/kg (max bolus 4000 units)  As needed (PRN)        Question:  Heparin Infusion Adjustment (DO NOT MODIFY ANSWER)  Answer:  [email or web address]\epic\Images\Pharmacy\HeparinInfusions\heparin LOW INTENSITY nomogram for [facility] [id number]    08/17/23 0730     heparin 25,000 units in dextrose 5% (100 units/ml) IV bolus from bag - ADDITIONAL PRN BOLUS - 30 units/kg (max bolus 4000 units)  As needed (PRN)        Question:  Heparin Infusion Adjustment (DO NOT MODIFY ANSWER)  Answer:  [email or web address]\[email or web address]\Images\Pharmacy\HeparinInfusions\heparin LOW INTENSITY nomogram for [facility] [id number]    08/17/23 0730     heparin 25,000 units in dextrose 5% 250 mL (100 units/mL) infusion LOW INTENSITY nomogram - [facility]  Continuous        Question Answer Comment   Heparin Infusion Adjustment (DO NOT MODIFY ANSWER) [email or web address]\epic\Images\Pharmacy\HeparinInfusions\heparin LOW INTENSITY nomogram for [facility] [id number]    Begin at (in units/kg/hr) 12        08/17/23 0730     Place sequential compression device  Until discontinued         08/17/23 0117     IP VTE HIGH RISK PATIENT  Once         08/17/23 0120     Reason for No Pharmacological VTE Prophylaxis  Once        Question:  Reasons:  Answer:  Physician Provided (leave comment)  Comment:  on heparin gtt    08/17/23 0120     Place sequential compression device  Until discontinued         08/17/23 0117                Thank you for your consult. Cardiology will follow-up with patient. Please contact us if you have any additional questions.    Monica Subramanian MD  Cardiology   [facility] - [facility] Stepdown     Posterior Auricular Interpolation Flap Text: A decision was made to reconstruct the defect utilizing an interpolation axial flap and a staged reconstruction.  A telfa template was made of the defect.  This telfa template was then used to outline the posterior auricular interpolation flap.  The donor area for the pedicle flap was then injected with anesthesia.  The flap was excised through the skin and subcutaneous tissue down to the layer of the underlying musculature.  The pedicle flap was carefully excised within this deep plane to maintain its blood supply.  The edges of the donor site were undermined.   The donor site was closed in a primary fashion.  The pedicle was then rotated into position and sutured.  Once the tube was sutured into place, adequate blood supply was confirmed with blanching and refill.  The pedicle was then wrapped with xeroform gauze and dressed appropriately with a telfa and gauze bandage to ensure continued blood supply and protect the attached pedicle.

## 2023-08-17 NOTE — HPI
Naldo Barakat is a 58 y.o. male with a PMHx of obesity, GERD, KAUSHAL who presents to [facility] for evaluation of chest pain. Patient reports acute onset substernal chest pain described as a tightness/ pressure began while playing tennis earlier today. No associated radiation but he did have some tingling to his bilateral forearms. He felt short of breath while playing tennis tonight but no more than usual which he attributes to being out of shape. He took 2 full-strength aspirin prior to arrival. He was given nitro in the ED which didn't improve his pain but made him feel strange. Endorses some relief with morphine to 2/10 but pain has returned to 5/10 at the time of my exam. He has gained some weight and has been eating unhealthy food over the past 5-6 months. Reports blood pressure reading was elevated to 140s a few weeks ago which was abnormal for him as BP was normal previously. Denies fever, chills, LE swelling/pain, HA, vision changes, diaphoresis, jaw pain, or syncope.     ED: hypertensive to /121, vitals otherwise stable. No leukocytosis or electrolyte abnormalities. BNP 23, Trop 0.066. EKG without acute ST/T. Started on heparin gtt.

## 2023-08-17 NOTE — H&P
[facility] - Emergency Dept  [facility]  History & Physical    Patient Name: Naldo Barakat  MRN: [id number]  Patient Class: IP- Inpatient  Admission Date: 8/16/2023  Attending Physician: Dena Noriega MD   Primary Care Provider: Anthony Wyman MD         Patient information was obtained from patient, spouse/SO, past medical records and ER records.     Subjective:     Principal Problem:NSTEMI (non-ST elevated myocardial infarction)    Chief Complaint:   Chief Complaint   Patient presents with    Chest Pain     Midsternal CP, described as tightness starting at 1930 while playing tennis, denies cardiac hx        HPI: Naldo Barakat is a 58 y.o. male with a PMHx of obesity, GERD, KAUSHAL who presents to [facility] [place] for evaluation of chest pain. Patient reports acute onset substernal chest pain described as a tightness/ pressure began while playing tennis earlier today. No associated radiation but he did have some tingling to his bilateral forearms. He felt short of breath while playing tennis tonight but no more than usual which he attributes to being out of shape. He took 2 full-strength aspirin prior to arrival. He was given nitro in the ED which didn't improve his pain but made him feel strange. Endorses some relief with morphine to 2/10 but pain has returned to 5/10 at the time of my exam. He has gained some weight and has been eating unhealthy food over the past 5-6 months. Reports blood pressure reading was elevated to 140s a few weeks ago which was abnormal for him as BP was normal previously. Denies fever, chills, LE swelling/pain, HA, vision changes, diaphoresis, jaw pain, or syncope.     ED: hypertensive to /121, vitals otherwise stable. No leukocytosis or electrolyte abnormalities. BNP 23, Trop 0.066. EKG without acute ST/T. Started on heparin gtt.       Past Medical History:   Diagnosis Date    Allergy     Eczema        Past Surgical History:   Procedure Laterality Date    COLONOSCOPY N/A 2/15/2016     Procedure: COLONOSCOPY;  Surgeon: STONE Sanchez MD;  Location: [facility] ([address] [place] [address]);  Service: Endoscopy;  Laterality: N/A;  PM Prep    HERNIA REPAIR         Review of patient's allergies indicates:   Allergen Reactions    Erythromycin     Macrolide antibiotics        Current Facility-Administered Medications on File Prior to Encounter   Medication    acetaminophen tablet 650 mg    albuterol inhaler 2 puff    cefTRIAXone injection 1 g    EPINEPHrine (EPIPEN) 0.3 mg/0.3 mL pen injection 0.3 mg    ondansetron disintegrating tablet 4 mg    predniSONE tablet 40 mg     Current Outpatient Medications on File Prior to Encounter   Medication Sig    amoxicillin (AMOXIL) 875 MG tablet Take 1 tablet (875 mg total) by mouth 2 (two) times daily. (Patient not taking: Reported on 4/6/2023)    azelastine (ASTELIN) 137 mcg (0.1 %) nasal spray 2 sprays (274 mcg total) by Nasal route 2 (two) times daily.    famotidine (PEPCID) 20 MG tablet Take 1 tablet (20 mg total) by mouth nightly as needed for Heartburn.    guaiFENesin-codeine 100-10 mg/5 ml (TUSSI-ORGANIDIN NR)  mg/5 mL syrup Take 5 mLs by mouth every 4 (four) hours as needed for Cough.    ketoconazole (NIZORAL) 2 % cream Apply topically once daily. (Patient not taking: Reported on 4/6/2023)    predniSONE (DELTASONE) 20 MG tablet Take 3 tablets by mouth daily for 3 days, then 2 tablets  daily for 3 days, then 1 tablets  daily for 3 days     Family History       Problem Relation (Age of Onset)    Breast cancer Mother    Cancer Mother, Father    Lung disease Sister    No Known Problems Brother, Sister          Tobacco Use    Smoking status: Never    Smokeless tobacco: Never   Substance and Sexual Activity    Alcohol use: Yes     Comment: limited    Drug use: Not on file    Sexual activity: Not on file     Review of Systems   Constitutional:  Negative for activity change, chills and fever.   HENT:  Negative for congestion, trouble swallowing and voice  change.    Eyes:  Negative for photophobia and visual disturbance.   Respiratory:  Positive for chest tightness and shortness of breath. Negative for wheezing.    Cardiovascular:  Positive for chest pain. Negative for palpitations and leg swelling.   Gastrointestinal:  Negative for abdominal pain, constipation, diarrhea, nausea and vomiting.   Genitourinary:  Negative for dysuria, frequency, hematuria and urgency.   Musculoskeletal:  Negative for arthralgias, back pain and gait problem.   Skin:  Negative for color change and rash.   Neurological:  Negative for dizziness, syncope, weakness, light-headedness, numbness and headaches.   Psychiatric/Behavioral:  Negative for agitation and confusion. The patient is not nervous/anxious.      Objective:     Vital Signs (Most Recent):  Temp: 98.1 °F (36.7 °C) (08/16/23 2155)  Pulse: 75 (08/17/23 0130)  Resp: 18 (08/17/23 0130)  BP: (!) 192/121 (08/17/23 0130)  SpO2: 98 % (08/17/23 0130) Vital Signs (24h Range):  Temp:  [98.1 °F (36.7 °C)] 98.1 °F (36.7 °C)  Pulse:  [75-87] 75  Resp:  [16-18] 18  SpO2:  [97 %-98 %] 98 %  BP: (179-192)/(107-133) 192/121     Weight: 115.7 kg (255 lb)  Body mass index is 35.57 kg/m².     Physical Exam  Vitals and nursing note reviewed.   Constitutional:       General: He is not in acute distress.     Appearance: He is well-developed. He is obese.   HENT:      Head: Normocephalic and atraumatic.      Mouth/Throat:      Pharynx: No oropharyngeal exudate.   Eyes:      General: No scleral icterus.     Conjunctiva/sclera: Conjunctivae normal.   Cardiovascular:      Rate and Rhythm: Normal rate and regular rhythm.      Heart sounds: Normal heart sounds.   Pulmonary:      Effort: Pulmonary effort is normal. No respiratory distress.      Breath sounds: Normal breath sounds. No wheezing.   Abdominal:      General: Bowel sounds are normal. There is no distension.      Palpations: Abdomen is soft.      Tenderness: There is no abdominal tenderness.  "  Musculoskeletal:         General: No tenderness. Normal range of motion.      Cervical back: Normal range of motion and neck supple.      Right lower leg: No edema.      Left lower leg: No edema.   Lymphadenopathy:      Cervical: No cervical adenopathy.   Skin:     General: Skin is warm and dry.      Capillary Refill: Capillary refill takes less than 2 seconds.      Findings: No rash.   Neurological:      Mental Status: He is alert and oriented to person, place, and time.      Cranial Nerves: No cranial nerve deficit.      Sensory: No sensory deficit.      Coordination: Coordination normal.   Psychiatric:         Behavior: Behavior normal.         Thought Content: Thought content normal.         Judgment: Judgment normal.                Significant Labs: All pertinent labs within the past 24 hours have been reviewed.  BMP:   Recent Labs   Lab 08/16/23  2308         K 4.2      CO2 22*   BUN 18   CREATININE 1.3   CALCIUM 9.1     CBC:   Recent Labs   Lab 08/16/23  2308   WBC 7.94   HGB 14.5   HCT 42.5          Significant Imaging: I have reviewed all pertinent imaging results/findings within the past 24 hours.  X-Ray Chest PA And Lateral  Narrative: EXAMINATION:  XR CHEST PA AND LATERAL    CLINICAL HISTORY:  Provided history is "  Chest pain, unspecified".    TECHNIQUE:  Frontal and lateral views of the chest were performed.    COMPARISON:  08/01/2022.    FINDINGS:  Cardiac silhouette is not enlarged. Lung volumes are relatively low, accentuating basilar markings.  No confluent area of consolidation.  No sizable pleural effusion.  No pneumothorax.  Impression: No acute cardiopulmonary finding.    Electronically signed by: Yaniv Pearson MD  Date:    08/17/2023  Time:    00:23        Assessment/Plan:     * NSTEMI (non-ST elevated myocardial infarction)  - possibly type 2 demand ischemic from uncontrolled HTN, though CP in the setting of elevated trop raises concern for type 1 MI  - trop " 0.066> trend  - EKG without acute ST/T wave changes  - started on heparin gtt in the ED, will continue   - hold on plavix pending cardiology eval/ repeat trop   - start ASA and HI statin  - BP control as below  - check TTE  - PRN nitro  - monitor tele     Hypertensive urgency  - BP uncontrolled on admit, possibly cause of chest pain and elevated trop  - start losartan 25mg daily and lopressor 25mg BID  - PRN hydralazine     KAUSHAL (obstructive sleep apnea)  - CPAP qhs       VTE Risk Mitigation (From admission, onward)         Ordered     heparin 25,000 units in dextrose 5% (100 units/ml) IV bolus from bag - ADDITIONAL PRN BOLUS - 60 units/kg (max bolus 4000 units)  As needed (PRN)        Question:  Heparin Infusion Adjustment (DO NOT MODIFY ANSWER)  Answer:  \\[email or web address]\epic\Images\Pharmacy\HeparinInfusions\heparin LOW INTENSITY nomogram for [facility] [id number].pdf    08/17/23 0005     heparin 25,000 units in dextrose 5% (100 units/ml) IV bolus from bag - ADDITIONAL PRN BOLUS - 30 units/kg (max bolus 4000 units)  As needed (PRN)        Question:  Heparin Infusion Adjustment (DO NOT MODIFY ANSWER)  Answer:  \\[email or web address]\epic\Images\Pharmacy\HeparinInfusions\heparin LOW INTENSITY nomogram for [facility] [id number].pdf    08/17/23 0005     Place sequential compression device  Until discontinued         08/17/23 0117     IP VTE HIGH RISK PATIENT  Once         08/17/23 0120     Reason for No Pharmacological VTE Prophylaxis  Once        Question:  Reasons:  Answer:  Physician Provided (leave comment)  Comment:  on heparin gtt    08/17/23 0120     Place sequential compression device  Until discontinued         08/17/23 0117     heparin 25,000 units in dextrose 5% 250 mL (100 units/mL) infusion LOW INTENSITY nomogram - [facility]  Continuous        Question Answer Comment   Heparin Infusion Adjustment (DO NOT MODIFY ANSWER) \\[email or web address]\epic\Images\Pharmacy\HeparinInfusions\heparin LOW INTENSITY nomogram for [facility] [id number].pdf    Begin at (in  units/kg/hr) 12 08/17/23 0005                           Sully Strauss PA-C  Department of Hospital Medicine  [facility] - Emergency Dept

## 2023-08-17 NOTE — ASSESSMENT & PLAN NOTE
Pt with no prior history of HTN presenting with elevated /121 > 207/110 in the ED. Started on losartan 50mg and lopressor 25 BID for blood pressure control. Received IV hydralazine 5mg and sublingual nitroglycerin x3 this AM with improved blood pressure 128/82.     RECOMMENDATIONS:  -treat HTN with goal for 25% reduction in 24 hours with eventual goal for SBP<130  -interested in digital HTN program, please order upon d/c  -will buy BP cuff himself once discharged, we also talked about weight reduction and low Na diet, he is very motivated to lose weight  -prescribe BP meds upon d/c since it is a new HTN diagnosis  -check lipid panel and hba1c

## 2023-08-17 NOTE — HOSPITAL COURSE
Trop increased to 1.6 overnight, with continued chest pressure. Cardiology recommended continuing ACS protocol and IC consulted for LHC.

## 2023-08-17 NOTE — ED NOTES
Telemetry Verification   Patient placed on Telemetry Box  Verified with War Room  Box # [id number]   Monitor Tech    Rate    Rhythm

## 2023-08-17 NOTE — PROGRESS NOTES
[facility] - Cardiology [facility] Medicine  Progress Note    Patient Name: Naldo Barakat  MRN: [id number]  Patient Class: IP- Inpatient   Admission Date: 8/16/2023  Length of Stay: 0 days  Attending Physician: Dk Shaffer MD  Primary Care Provider: Anthony Wyman MD        Subjective:     Principal Problem:NSTEMI (non-ST elevated myocardial infarction)        HPI:  Naldo Barakat is a 58 y.o. male with a PMHx of obesity, GERD, KAUSHAL who presents to [facility] [place] [facility] for evaluation of chest pain. Patient reports acute onset substernal chest pain described as a tightness/ pressure began while playing tennis earlier today. No associated radiation but he did have some tingling to his bilateral forearms. He felt short of breath while playing tennis tonight but no more than usual which he attributes to being out of shape. He took 2 full-strength aspirin prior to arrival. He was given nitro in the ED which didn't improve his pain but made him feel strange. Endorses some relief with morphine to 2/10 but pain has returned to 5/10 at the time of my exam. He has gained some weight and has been eating unhealthy food over the past 5-6 months. Reports blood pressure reading was elevated to 140s a few weeks ago which was abnormal for him as BP was normal previously. Denies fever, chills, LE swelling/pain, HA, vision changes, diaphoresis, jaw pain, or syncope.     ED: hypertensive to /121, vitals otherwise stable. No leukocytosis or electrolyte abnormalities. BNP 23, Trop 0.066. EKG without acute ST/T. Started on heparin gtt.       Overview/Hospital Course:  Trop increased to 1.6 overnight, with continued chest pressure. Cardiology recommended continuing ACS protocol and IC consulted for LHC.      Interval History: Continued chest pressure over    Review of Systems   Constitutional:  Negative for fatigue and fever.   Cardiovascular:  Positive for chest pain.     Objective:     Vital Signs (Most Recent):  Temp: 98.7 °F (37.1 °C)  (08/17/23 1111)  Pulse: 68 (08/17/23 1132)  Resp: 18 (08/17/23 1111)  BP: (!) 143/91 (08/17/23 1111)  SpO2: 96 % (08/17/23 1111) Vital Signs (24h Range):  Temp:  [97.8 °F (36.6 °C)-98.7 °F (37.1 °C)] 98.7 °F (37.1 °C)  Pulse:  [54-87] 68  Resp:  [12-19] 18  SpO2:  [95 %-99 %] 96 %  BP: (128-192)/() 143/91     Weight: 115.2 kg (254 lb)  Body mass index is 35.43 kg/m².  No intake or output data in the 24 hours ending 08/17/23 1359      Physical Exam  Constitutional:       General: He is not in acute distress.     Appearance: He is not ill-appearing.   Cardiovascular:      Rate and Rhythm: Normal rate and regular rhythm.      Pulses: Normal pulses.      Heart sounds: Normal heart sounds. No murmur heard.  Pulmonary:      Effort: Pulmonary effort is normal. No respiratory distress.      Breath sounds: Normal breath sounds.   Skin:     Capillary Refill: Capillary refill takes less than 2 seconds.      Coloration: Skin is not jaundiced.   Neurological:      General: No focal deficit present.      Mental Status: He is oriented to person, place, and time.             Significant Labs: All pertinent labs within the past 24 hours have been reviewed.  CBC:   Recent Labs   Lab 08/16/23 2308 08/17/23  0614   WBC 7.94 7.05  7.05   HGB 14.5 13.4*  13.4*   HCT 42.5 39.6*  39.6*    230  230     CMP:   Recent Labs   Lab 08/16/23  2308 08/17/23  0614    143   K 4.2 3.9    111*   CO2 22* 23    112*   BUN 18 15   CREATININE 1.3 1.2   CALCIUM 9.1 8.5*   PROT 7.3  --    ALBUMIN 4.1  --    BILITOT 0.4  --    ALKPHOS 68  --    AST 28  --    ALT 32  --    ANIONGAP 14 9     Troponin:   Recent Labs   Lab 08/17/23  0114 08/17/23  0614 08/17/23  1119   TROPONINI 0.472* 1.603* 1.919*       Significant Imaging: I have reviewed all pertinent imaging results/findings within the past 24 hours.        Assessment/Plan:      * NSTEMI (non-ST elevated myocardial infarction)  Suspect Type 1 NSTEMI  - trop 0.066 on  admission, trend to peak  - EKG without acute ST/T wave changes  - started on heparin gtt in the ED, will continue   - Continue plavix  - start ASA and HI statin  - BP control as below  - check TTE  - PRN nitro  - monitor tele   -Pending [facility], appreciate cards recs    Hypertensive urgency  - BP uncontrolled on admit, possibly cause of chest pain and elevated trop  - start losartan 25mg daily and lopressor 25mg BID  - PRN hydralazine     KAUSHAL (obstructive sleep apnea)  - CPAP qhs       VTE Risk Mitigation (From admission, onward)         Ordered     heparin 25,000 units in dextrose 5% (100 units/ml) IV bolus from bag - ADDITIONAL PRN BOLUS - 60 units/kg (max bolus 4000 units)  As needed (PRN)        Question:  Heparin Infusion Adjustment (DO NOT MODIFY ANSWER)  Answer:  \\[email or web address]\epic\Images\Pharmacy\HeparinInfusions\heparin LOW INTENSITY nomogram for [facility] [id number].pdf    08/17/23 0730     heparin 25,000 units in dextrose 5% (100 units/ml) IV bolus from bag - ADDITIONAL PRN BOLUS - 30 units/kg (max bolus 4000 units)  As needed (PRN)        Question:  Heparin Infusion Adjustment (DO NOT MODIFY ANSWER)  Answer:  \\[email or web address]\epic\Images\Pharmacy\HeparinInfusions\heparin LOW INTENSITY nomogram for [facility] [id number].pdf    08/17/23 0730     heparin 25,000 units in dextrose 5% 250 mL (100 units/mL) infusion LOW INTENSITY nomogram - [facility]  Continuous        Question Answer Comment   Heparin Infusion Adjustment (DO NOT MODIFY ANSWER) \\[email or web address]\epic\Images\Pharmacy\HeparinInfusions\heparin LOW INTENSITY nomogram for [facility] [id number].pdf    Begin at (in units/kg/hr) 12        08/17/23 0730     Place sequential compression device  Until discontinued         08/17/23 0117     IP VTE HIGH RISK PATIENT  Once         08/17/23 0120     Reason for No Pharmacological VTE Prophylaxis  Once        Question:  Reasons:  Answer:  Physician Provided (leave comment)  Comment:  on heparin gtt    08/17/23 0120     Place sequential compression device   Until discontinued         08/17/23 0117     heparin (porcine) in 5 % dex 25,000 unit/250 mL(100 unit/mL) infusion         08/17/23 0043                Discharge Planning   ELIZABETH: 8/18/2023     Code Status: Full Code   Is the patient medically ready for discharge?: No    Reason for patient still in hospital (select all that apply): Patient trending condition and Treatment  Discharge Plan A: Home with family                  Dk Shaffer MD  Department of Hospital Medicine   [facility] - Cardiology Stepdown

## 2023-08-17 NOTE — BRIEF OP NOTE
Brief Operative Note:    : Sanchez Burden MD     Referring Physician: Self,Aaareferral     All Operators: Surgeon(s):  Nick Hernandez MD Maitas, Oscar, MD Patel, Rajan Amish G, MD     Preoperative Diagnosis: Non-ST elevation myocardial infarction (NSTEMI) [I21.4]     Postop Diagnosis: Non-ST elevation myocardial infarction (NSTEMI) [I21.4]    Treatments/Procedures: Procedure(s) (LRB):  Left heart cath (Left)    Access: Right radial artery    Findings:    Nonobstructive CAD noted     See catheterization report for full details.    Intervention:   None   See catheterization report for full details.    Closure device:  VascBand         Plan:  - Post cath protocol   - IVF @ 150 cc/hr x 4 hours  - Continue aspirin 81 mg daily indefinitely  - Continue Plavix for minimum 6 months  - Continue high intensity statin therapy (LDL goal < 70)  - Risk factor reduction (BP <130/80 mmHg, glycemic control, etc)  - Follow up with outpatient cardiologist    Estimated Blood loss: 20 cc    Specimens removed: No    Nick Hernandez MD  Interventional Cardiology PGY-6

## 2023-08-17 NOTE — SUBJECTIVE & OBJECTIVE
Past Medical History:   Diagnosis Date    Allergy     Eczema        Past Surgical History:   Procedure Laterality Date    COLONOSCOPY N/A 2/15/2016    Procedure: COLONOSCOPY;  Surgeon: STONE Sanchez MD;  Location: [facility] ([address] [place] [address]);  Service: Endoscopy;  Laterality: N/A;  PM Prep    HERNIA REPAIR         Review of patient's allergies indicates:   Allergen Reactions    Erythromycin     Macrolide antibiotics        Facility-Administered Medications Prior to Admission   Medication    acetaminophen tablet 650 mg    albuterol inhaler 2 puff    cefTRIAXone injection 1 g    EPINEPHrine (EPIPEN) 0.3 mg/0.3 mL pen injection 0.3 mg    ondansetron disintegrating tablet 4 mg    predniSONE tablet 40 mg     PTA Medications   Medication Sig    amoxicillin (AMOXIL) 875 MG tablet Take 1 tablet (875 mg total) by mouth 2 (two) times daily. (Patient not taking: Reported on 4/6/2023)    azelastine (ASTELIN) 137 mcg (0.1 %) nasal spray 2 sprays (274 mcg total) by Nasal route 2 (two) times daily.    famotidine (PEPCID) 20 MG tablet Take 1 tablet (20 mg total) by mouth nightly as needed for Heartburn.    guaiFENesin-codeine 100-10 mg/5 ml (TUSSI-ORGANIDIN NR)  mg/5 mL syrup Take 5 mLs by mouth every 4 (four) hours as needed for Cough.    ketoconazole (NIZORAL) 2 % cream Apply topically once daily. (Patient not taking: Reported on 4/6/2023)    predniSONE (DELTASONE) 20 MG tablet Take 3 tablets by mouth daily for 3 days, then 2 tablets  daily for 3 days, then 1 tablets  daily for 3 days     Family History       Problem Relation (Age of Onset)    Breast cancer Mother    Cancer Mother, Father    Lung disease Sister    No Known Problems Brother, Sister          Tobacco Use    Smoking status: Never    Smokeless tobacco: Never   Substance and Sexual Activity    Alcohol use: Yes     Comment: limited    Drug use: Not on file    Sexual activity: Not on file     Review of Systems   Constitutional: Negative for fever and malaise/fatigue.    Eyes:  Negative for blurred vision and pain.   Cardiovascular:  Positive for chest pain. Negative for dyspnea on exertion, leg swelling, orthopnea, palpitations and paroxysmal nocturnal dyspnea.   Respiratory:  Negative for cough, shortness of breath, sputum production and wheezing.    Hematologic/Lymphatic: Negative for adenopathy and bleeding problem.   Skin:  Negative for rash.   Musculoskeletal:  Negative for back pain and neck pain.   Gastrointestinal:  Negative for abdominal pain, constipation, diarrhea, nausea and vomiting.   Genitourinary:  Negative for dysuria.   Neurological:  Negative for dizziness, headaches, light-headedness and weakness.     Objective:     Vital Signs (Most Recent):  Temp: 97.8 °F (36.6 °C) (08/17/23 0727)  Pulse: 84 (08/17/23 0832)  Resp: 18 (08/17/23 0832)  BP: 128/82 (08/17/23 0832)  SpO2: 96 % (08/17/23 0727) Vital Signs (24h Range):  Temp:  [97.8 °F (36.6 °C)-98.7 °F (37.1 °C)] 97.8 °F (36.6 °C)  Pulse:  [54-87] 84  Resp:  [12-19] 18  SpO2:  [95 %-99 %] 96 %  BP: (128-192)/() 128/82     Weight: 115.6 kg (254 lb 13.6 oz)  Body mass index is 35.54 kg/m².    SpO2: 96 %       No intake or output data in the 24 hours ending 08/17/23 0853    Lines/Drains/Airways       Peripheral Intravenous Line  Duration                  Peripheral IV - Single Lumen 08/16/23 2309 20 G Left Antecubital <1 day         Peripheral IV - Single Lumen 08/17/23 0107 22 G Anterior;Right Forearm <1 day                     Physical Exam  Constitutional:       General: He is not in acute distress.     Appearance: Normal appearance. He is not ill-appearing, toxic-appearing or diaphoretic.   HENT:      Head: Normocephalic and atraumatic.      Nose: Nose normal.   Eyes:      Extraocular Movements: Extraocular movements intact.      Pupils: Pupils are equal, round, and reactive to light.   Cardiovascular:      Rate and Rhythm: Normal rate and regular rhythm.      Heart sounds: No murmur heard.     No friction  rub. No gallop.   Pulmonary:      Effort: Pulmonary effort is normal. No respiratory distress.      Breath sounds: Normal breath sounds. No wheezing or rales.   Abdominal:      General: Abdomen is flat. There is no distension.      Palpations: Abdomen is soft. There is no mass.      Tenderness: There is no abdominal tenderness.   Musculoskeletal:         General: No swelling. Normal range of motion.      Cervical back: Normal range of motion. No rigidity.      Right lower leg: No edema.      Left lower leg: No edema.   Skin:     General: Skin is warm and dry.      Coloration: Skin is not jaundiced.      Findings: No bruising.   Neurological:      General: No focal deficit present.      Mental Status: He is alert and oriented to person, place, and time.      Cranial Nerves: No cranial nerve deficit.      Motor: No weakness.            Significant Labs: BMP:   Recent Labs   Lab 08/16/23 2308 08/17/23  0614    112*    143   K 4.2 3.9    111*   CO2 22* 23   BUN 18 15   CREATININE 1.3 1.2   CALCIUM 9.1 8.5*   MG  --  1.8    and CBC   Recent Labs   Lab 08/16/23 2308 08/17/23  0614   WBC 7.94 7.05  7.05   HGB 14.5 13.4*  13.4*   HCT 42.5 39.6*  39.6*    230  230       Significant Imaging: Reviewed

## 2023-08-18 VITALS
HEART RATE: 81 BPM | TEMPERATURE: 99 F | DIASTOLIC BLOOD PRESSURE: 92 MMHG | SYSTOLIC BLOOD PRESSURE: 150 MMHG | HEIGHT: 71 IN | BODY MASS INDEX: 35.56 KG/M2 | OXYGEN SATURATION: 97 % | RESPIRATION RATE: 18 BRPM | WEIGHT: 254 LBS

## 2023-08-18 LAB
ANION GAP SERPL CALC-SCNC: 9 MMOL/L (ref 8–16)
BASOPHILS # BLD AUTO: 0.04 K/UL (ref 0–0.2)
BASOPHILS # BLD AUTO: 0.06 K/UL (ref 0–0.2)
BASOPHILS NFR BLD: 0.5 % (ref 0–1.9)
BASOPHILS NFR BLD: 0.8 % (ref 0–1.9)
BUN SERPL-MCNC: 11 MG/DL (ref 6–20)
CALCIUM SERPL-MCNC: 8.7 MG/DL (ref 8.7–10.5)
CHLORIDE SERPL-SCNC: 110 MMOL/L (ref 95–110)
CO2 SERPL-SCNC: 22 MMOL/L (ref 23–29)
CREAT SERPL-MCNC: 1.2 MG/DL (ref 0.5–1.4)
DIFFERENTIAL METHOD: ABNORMAL
DIFFERENTIAL METHOD: ABNORMAL
EOSINOPHIL # BLD AUTO: 0.1 K/UL (ref 0–0.5)
EOSINOPHIL # BLD AUTO: 0.1 K/UL (ref 0–0.5)
EOSINOPHIL NFR BLD: 0.6 % (ref 0–8)
EOSINOPHIL NFR BLD: 1.1 % (ref 0–8)
ERYTHROCYTE [DISTWIDTH] IN BLOOD BY AUTOMATED COUNT: 12.5 % (ref 11.5–14.5)
ERYTHROCYTE [DISTWIDTH] IN BLOOD BY AUTOMATED COUNT: 12.5 % (ref 11.5–14.5)
EST. GFR  (NO RACE VARIABLE): >60 ML/MIN/1.73 M^2
GLUCOSE SERPL-MCNC: 105 MG/DL (ref 70–110)
HCT VFR BLD AUTO: 42.1 % (ref 40–54)
HCT VFR BLD AUTO: 43.1 % (ref 40–54)
HGB BLD-MCNC: 14 G/DL (ref 14–18)
HGB BLD-MCNC: 14.4 G/DL (ref 14–18)
IMM GRANULOCYTES # BLD AUTO: 0.01 K/UL (ref 0–0.04)
IMM GRANULOCYTES # BLD AUTO: 0.03 K/UL (ref 0–0.04)
IMM GRANULOCYTES NFR BLD AUTO: 0.1 % (ref 0–0.5)
IMM GRANULOCYTES NFR BLD AUTO: 0.4 % (ref 0–0.5)
LYMPHOCYTES # BLD AUTO: 2 K/UL (ref 1–4.8)
LYMPHOCYTES # BLD AUTO: 2.4 K/UL (ref 1–4.8)
LYMPHOCYTES NFR BLD: 27.6 % (ref 18–48)
LYMPHOCYTES NFR BLD: 28.2 % (ref 18–48)
MAGNESIUM SERPL-MCNC: 1.9 MG/DL (ref 1.6–2.6)
MCH RBC QN AUTO: 33.2 PG (ref 27–31)
MCH RBC QN AUTO: 33.2 PG (ref 27–31)
MCHC RBC AUTO-ENTMCNC: 33.3 G/DL (ref 32–36)
MCHC RBC AUTO-ENTMCNC: 33.4 G/DL (ref 32–36)
MCV RBC AUTO: 100 FL (ref 82–98)
MCV RBC AUTO: 99 FL (ref 82–98)
MONOCYTES # BLD AUTO: 0.7 K/UL (ref 0.3–1)
MONOCYTES # BLD AUTO: 0.8 K/UL (ref 0.3–1)
MONOCYTES NFR BLD: 8.9 % (ref 4–15)
MONOCYTES NFR BLD: 9.1 % (ref 4–15)
NEUTROPHILS # BLD AUTO: 4.5 K/UL (ref 1.8–7.7)
NEUTROPHILS # BLD AUTO: 5.1 K/UL (ref 1.8–7.7)
NEUTROPHILS NFR BLD: 61.2 % (ref 38–73)
NEUTROPHILS NFR BLD: 61.5 % (ref 38–73)
NRBC BLD-RTO: 0 /100 WBC
NRBC BLD-RTO: 0 /100 WBC
PHOSPHATE SERPL-MCNC: 3.1 MG/DL (ref 2.7–4.5)
PLATELET # BLD AUTO: 227 K/UL (ref 150–450)
PLATELET # BLD AUTO: 255 K/UL (ref 150–450)
PMV BLD AUTO: 9.4 FL (ref 9.2–12.9)
PMV BLD AUTO: 9.4 FL (ref 9.2–12.9)
POTASSIUM SERPL-SCNC: 4.3 MMOL/L (ref 3.5–5.1)
RBC # BLD AUTO: 4.22 M/UL (ref 4.6–6.2)
RBC # BLD AUTO: 4.34 M/UL (ref 4.6–6.2)
SODIUM SERPL-SCNC: 141 MMOL/L (ref 136–145)
WBC # BLD AUTO: 7.28 K/UL (ref 3.9–12.7)
WBC # BLD AUTO: 8.32 K/UL (ref 3.9–12.7)

## 2023-08-18 PROCEDURE — 25000003 PHARM REV CODE 250: Performed by: PHYSICIAN ASSISTANT

## 2023-08-18 PROCEDURE — 84100 ASSAY OF PHOSPHORUS: CPT | Performed by: PHYSICIAN ASSISTANT

## 2023-08-18 PROCEDURE — 83735 ASSAY OF MAGNESIUM: CPT | Performed by: PHYSICIAN ASSISTANT

## 2023-08-18 PROCEDURE — 25000003 PHARM REV CODE 250: Performed by: INTERNAL MEDICINE

## 2023-08-18 PROCEDURE — 99239 HOSP IP/OBS DSCHRG MGMT >30: CPT | Mod: ,,, | Performed by: STUDENT IN AN ORGANIZED HEALTH CARE EDUCATION/TRAINING PROGRAM

## 2023-08-18 PROCEDURE — 99239 PR HOSPITAL DISCHARGE DAY,>30 MIN: ICD-10-PCS | Mod: ,,, | Performed by: STUDENT IN AN ORGANIZED HEALTH CARE EDUCATION/TRAINING PROGRAM

## 2023-08-18 PROCEDURE — 25000003 PHARM REV CODE 250: Performed by: STUDENT IN AN ORGANIZED HEALTH CARE EDUCATION/TRAINING PROGRAM

## 2023-08-18 PROCEDURE — 36415 COLL VENOUS BLD VENIPUNCTURE: CPT | Performed by: PHYSICIAN ASSISTANT

## 2023-08-18 PROCEDURE — 99232 SBSQ HOSP IP/OBS MODERATE 35: CPT | Mod: ,,, | Performed by: INTERNAL MEDICINE

## 2023-08-18 PROCEDURE — 80048 BASIC METABOLIC PNL TOTAL CA: CPT | Performed by: PHYSICIAN ASSISTANT

## 2023-08-18 PROCEDURE — 25000003 PHARM REV CODE 250

## 2023-08-18 PROCEDURE — 99232 PR SUBSEQUENT HOSPITAL CARE,LEVL II: ICD-10-PCS | Mod: ,,, | Performed by: INTERNAL MEDICINE

## 2023-08-18 PROCEDURE — 85025 COMPLETE CBC W/AUTO DIFF WBC: CPT | Performed by: PHYSICIAN ASSISTANT

## 2023-08-18 RX ORDER — METOPROLOL SUCCINATE 50 MG/1
50 TABLET, EXTENDED RELEASE ORAL DAILY
Qty: 30 TABLET | Refills: 11 | Status: SHIPPED | OUTPATIENT
Start: 2023-08-19 | End: 2024-08-18

## 2023-08-18 RX ORDER — LOSARTAN POTASSIUM 50 MG/1
50 TABLET ORAL DAILY
Qty: 90 TABLET | Refills: 3 | Status: SHIPPED | OUTPATIENT
Start: 2023-08-19 | End: 2024-08-18

## 2023-08-18 RX ORDER — ATORVASTATIN CALCIUM 80 MG/1
40 TABLET, FILM COATED ORAL DAILY
Qty: 45 TABLET | Refills: 3 | Status: SHIPPED | OUTPATIENT
Start: 2023-08-19 | End: 2024-08-18

## 2023-08-18 RX ORDER — HYDRALAZINE HYDROCHLORIDE 25 MG/1
25 TABLET, FILM COATED ORAL EVERY 12 HOURS PRN
Qty: 90 TABLET | Refills: 3 | Status: SHIPPED | OUTPATIENT
Start: 2023-08-18 | End: 2024-08-17

## 2023-08-18 RX ORDER — NAPROXEN SODIUM 220 MG/1
81 TABLET, FILM COATED ORAL DAILY
Qty: 90 TABLET | Refills: 3 | Status: SHIPPED | OUTPATIENT
Start: 2023-08-19 | End: 2023-08-18 | Stop reason: HOSPADM

## 2023-08-18 RX ORDER — METOPROLOL SUCCINATE 50 MG/1
50 TABLET, EXTENDED RELEASE ORAL DAILY
Status: DISCONTINUED | OUTPATIENT
Start: 2023-08-18 | End: 2023-08-18 | Stop reason: HOSPADM

## 2023-08-18 RX ORDER — ATORVASTATIN CALCIUM 80 MG/1
80 TABLET, FILM COATED ORAL DAILY
Qty: 90 TABLET | Refills: 3 | Status: SHIPPED | OUTPATIENT
Start: 2023-08-19 | End: 2023-08-18 | Stop reason: SDUPTHER

## 2023-08-18 RX ORDER — HYDROCHLOROTHIAZIDE 25 MG/1
25 TABLET ORAL DAILY
Qty: 30 TABLET | Refills: 11 | Status: SHIPPED | OUTPATIENT
Start: 2023-08-19 | End: 2024-08-18

## 2023-08-18 RX ADMIN — ATORVASTATIN CALCIUM 80 MG: 20 TABLET, FILM COATED ORAL at 08:08

## 2023-08-18 RX ADMIN — ASPIRIN 81 MG 81 MG: 81 TABLET ORAL at 08:08

## 2023-08-18 RX ADMIN — LOSARTAN POTASSIUM 50 MG: 50 TABLET, FILM COATED ORAL at 08:08

## 2023-08-18 RX ADMIN — HYDROCHLOROTHIAZIDE 25 MG: 12.5 TABLET ORAL at 08:08

## 2023-08-18 NOTE — ASSESSMENT & PLAN NOTE
Pt with no prior history of hypertension presenting with elevated /121 > 207/110 in the ED. Started on losartan 50mg and lopressor 25 BID for blood pressure control. Received IV hydralazine 5mg and sublingual nitroglycerin x3 this AM with improved blood pressure 128/82.     RECOMMENDATIONS:  -treat HTN with goal for 25% reduction in 24 hours with eventual goal for SBP<130  -will buy BP cuff himself once discharged, we also talked about weight reduction and low Na diet, he is very motivated to lose weight  -check lipid panel and hba1c  -optimization of antihypertensive medications on discharge  -inpatient regimen currently consisting of losartan 50mg daily, hydrochlorothiazide 25mg daily, metoprolol succinate 50mg daily  -outpatient follow-up with cardiology for optimization of antihypertensive regimen  -consider PRN hydralazine for systolic blood pressures > 170-180s   -DASH diet

## 2023-08-18 NOTE — NURSING
Pt with d/c orders in place.  Pt to d/c home to self care with family.  Discharge instructions printed and given to pt and spouse.  Questions and concerns addressed.  Medications picked up from [facility] before discharge.  PIVs/ telebox removed.  Pt educated on BP management/ DASH diet upon discharge.

## 2023-08-18 NOTE — ASSESSMENT & PLAN NOTE
Pt is a 58 year old male with no significant cardiac history presenting with substernal chest pain/pressure. ECG without ST/T wave changes. Elevated troponin 0.06 > 0.47 > 1.603. BNP wnl. Pt loaded himself with ASA prior to presenting to hospital. Chest pressure with relief after sublingual nitroglycerin x3 this AM. Cardiology consulted for chest pain concerning for NSTEMI.     Echo (08/16/23)  -Regional wall motion abnormalities present.  -Normal systolic function with a visually estimated ejection fraction of 60 - 65%.    -Normal diastolic function.    Pt now s/p LHC with findings suggestive for non-obstructive CAD, no intervention was necessary.     Pt with type II NSTEMI likely due to demand ischemia in the setting of severely elevated blood pressure. ASCVD score estimated to be 10.9% risk of heart disease or stroke within the next 10-years.     RECOMMENDATIONS:  -no indication for antiplatelet therapy at this time  -optimization of medications for blood pressure control  -ACE inhibitor/ARB and thiazide diuretic on discharge  -okay to be discharged with beta-blocker to help with blood pressure  -low dose atorvastatin with outpatient titration for goal LDL< 70   -consider PRN hydralazine for systolic BP > 170-180s  -DASH diet   -outpatient follow-up with cardiology for titration of HTN medications for optimized management

## 2023-08-18 NOTE — DISCHARGE INSTRUCTIONS
Dear Mr. Barakat,    You were admitted for chest pain and underwent an angiogram, this did not reveal any acute blockages but did show heart of atherosclerotic disease, meaning chronic disease causing mild narrowing of your heart vessels, as a result we are discharging you with lipitor. You will also need several blood pressure medications due to your persistently high blood pressure, which are listed above

## 2023-08-18 NOTE — PLAN OF CARE
Problem: Adult Inpatient Plan of Care  Goal: Plan of Care Review  Outcome: Met  Goal: Patient-Specific Goal (Individualized)  Outcome: Met  Goal: Absence of Hospital-Acquired Illness or Injury  Outcome: Met  Goal: Optimal Comfort and Wellbeing  Outcome: Met  Goal: Readiness for Transition of Care  Outcome: Met     Problem: Adjustment to Illness (Acute Coronary Syndrome)  Goal: Optimal Adaptation to Illness  Outcome: Met     Problem: Dysrhythmia (Acute Coronary Syndrome)  Goal: Normalized Cardiac Rhythm  Outcome: Met     Problem: Cardiac-Related Pain (Acute Coronary Syndrome)  Goal: Absence of Cardiac-Related Pain  Outcome: Met     Problem: Hemodynamic Instability (Acute Coronary Syndrome)  Goal: Effective Cardiac Pump Function  Outcome: Met     Problem: Tissue Perfusion (Acute Coronary Syndrome)  Goal: Adequate Tissue Perfusion  Outcome: Met     Problem: Arrhythmia/Dysrhythmia (Cardiac Catheterization)  Goal: Stable Heart Rate and Rhythm  Outcome: Met     Problem: Bleeding (Cardiac Catheterization)  Goal: Absence of Bleeding  Outcome: Met     Problem: Contrast-Induced Injury Risk (Cardiac Catheterization)  Goal: Absence of Contrast-Induced Injury  Outcome: Met     Problem: Embolism (Cardiac Catheterization)  Goal: Absence of Embolism Signs and Symptoms  Outcome: Met     Problem: Ongoing Anesthesia/Sedation Effects (Cardiac Catheterization)  Goal: Anesthesia/Sedation Recovery  Outcome: Met     Problem: Pain (Cardiac Catheterization)  Goal: Acceptable Pain Control  Outcome: Met     Problem: Vascular Access Protection (Cardiac Catheterization)  Goal: Absence of Vascular Access Complication  Outcome: Met     Problem: Infection  Goal: Absence of Infection Signs and Symptoms  Outcome: Met     Problem: Fall Injury Risk  Goal: Absence of Fall and Fall-Related Injury  Outcome: Met

## 2023-08-18 NOTE — PROGRESS NOTES
[facility]  Cardiology  Progress Note    Patient Name: Naldo Barakat  MRN: [id number]  Admission Date: 8/16/2023  Hospital Length of Stay: 1 days  Code Status: Full Code   Attending Physician: Dk Shaffer MD   Primary Care Physician: Anthony Wyman MD  Expected Discharge Date: 8/18/2023  Principal Problem:NSTEMI (non-ST elevated myocardial infarction)    Subjective:     Interval History: No acute events overnight. Pt s/p LHC yesterday with non-obstructive CAD. Pt is doing well this morning without complaints of chest pain, shortness of breath, nausea/vomiting. Blood pressure slightly improved this AM at 142/94.     Review of Systems   Constitutional: Negative for chills, fever and malaise/fatigue.   Eyes:  Negative for blurred vision and double vision.   Cardiovascular:  Negative for chest pain, dyspnea on exertion, leg swelling and palpitations.   Respiratory:  Negative for shortness of breath and wheezing.    Gastrointestinal:  Negative for abdominal pain, constipation, diarrhea, nausea and vomiting.   Neurological:  Negative for headaches and light-headedness.     Objective:     Vital Signs (Most Recent):  Temp: 98.5 °F (36.9 °C) (08/18/23 1059)  Pulse: 81 (08/18/23 1122)  Resp: 18 (08/18/23 1059)  BP: (!) 150/92 (08/18/23 1059)  SpO2: 97 % (08/18/23 1059) Vital Signs (24h Range):  Temp:  [97.9 °F (36.6 °C)-98.7 °F (37.1 °C)] 98.5 °F (36.9 °C)  Pulse:  [51-81] 81  Resp:  [16-18] 18  SpO2:  [93 %-97 %] 97 %  BP: (136-152)/() 150/92     Weight: 115.2 kg (253 lb 15.5 oz)  Body mass index is 35.42 kg/m².     SpO2: 97 %       No intake or output data in the 24 hours ending 08/18/23 1321    Lines/Drains/Airways       Peripheral Intravenous Line  Duration                  Peripheral IV - Single Lumen 08/16/23 2309 20 G Left Antecubital 1 day         Peripheral IV - Single Lumen 08/17/23 0107 22 G Anterior;Right Forearm 1 day                       Physical Exam  Vitals and nursing note  reviewed.   Constitutional:       General: He is not in acute distress.     Appearance: Normal appearance. He is not ill-appearing or toxic-appearing.   HENT:      Head: Normocephalic and atraumatic.      Mouth/Throat:      Mouth: Mucous membranes are moist.      Pharynx: Oropharynx is clear.   Eyes:      Conjunctiva/sclera: Conjunctivae normal.   Cardiovascular:      Rate and Rhythm: Normal rate and regular rhythm.      Pulses: Normal pulses.      Heart sounds: Normal heart sounds. No murmur heard.     No friction rub. No gallop.   Pulmonary:      Effort: Pulmonary effort is normal. No respiratory distress.      Breath sounds: Normal breath sounds. No stridor. No wheezing or rales.   Abdominal:      General: Abdomen is flat.      Palpations: Abdomen is soft.   Musculoskeletal:         General: No swelling.   Skin:     General: Skin is warm and dry.      Findings: No bruising.   Neurological:      General: No focal deficit present.      Mental Status: He is alert and oriented to person, place, and time.   Psychiatric:         Mood and Affect: Mood normal.         Behavior: Behavior normal.            Significant Labs: BMP:   Recent Labs   Lab 08/16/23  2308 08/17/23  0614 08/18/23  0455    112* 105    143 141   K 4.2 3.9 4.3    111* 110   CO2 22* 23 22*   BUN 18 15 11   CREATININE 1.3 1.2 1.2   CALCIUM 9.1 8.5* 8.7   MG  --  1.8 1.9   , CBC   Recent Labs   Lab 08/17/23  0614 08/17/23  2330 08/18/23  0455   WBC 7.05  7.05 8.32 7.28   HGB 13.4*  13.4* 14.4 14.0   HCT 39.6*  39.6* 43.1 42.1     230 255 227   , and Troponin   Recent Labs   Lab 08/17/23  0614 08/17/23  1119 08/17/23  1459   TROPONINI 1.603* 1.919* 1.696*       Significant Imaging:     Cardiac Cath: non-obstructive CAD    Results for orders placed during the hospital encounter of 08/16/23    Echo    Interpretation Summary    Technically challenging study with poor endocardial visualization.    Left Ventricle: The left  ventricle is normal in size. Normal wall thickness. Regional wall motion abnormalities present, see diagram for wall motion findings. There is normal systolic function with a visually estimated ejection fraction of 60 - 65%. There is normal diastolic function.    Right Ventricle: Normal right ventricular cavity size. Wall thickness is normal. Right ventricle wall motion  is normal. Systolic function is normal.    Aorta: Aortic root is mildly dilated measuring 4.2 cm. Ascending aorta is mildly dilated measuring 3.9 cm.    IVC/SVC: Normal venous pressure at 3 mmHg.      Assessment and Plan:     * NSTEMI (non-ST elevated myocardial infarction)  Pt is a 58 year old male with no significant cardiac history presenting with substernal chest pain/pressure. ECG without ST/T wave changes. Elevated troponin 0.06 > 0.47 > 1.603. BNP wnl. Pt loaded himself with ASA prior to presenting to hospital. Chest pressure with relief after sublingual nitroglycerin x3 this AM. Cardiology consulted for chest pain concerning for NSTEMI.     Echo (08/16/23)  -Regional wall motion abnormalities present.  -Normal systolic function with a visually estimated ejection fraction of 60 - 65%.    -Normal diastolic function.    Pt now s/p LHC with findings suggestive for non-obstructive CAD, no intervention was necessary.     Pt with type II NSTEMI likely due to demand ischemia in the setting of severely elevated blood pressure. ASCVD score estimated to be 10.9% risk of heart disease or stroke within the next 10-years.     RECOMMENDATIONS:  -no indication for antiplatelet therapy at this time  -optimization of medications for blood pressure control  -ACE inhibitor/ARB and thiazide diuretic on discharge  -okay to be discharged with beta-blocker to help with blood pressure  -low dose atorvastatin with outpatient titration for goal LDL< 70   -consider PRN hydralazine for systolic BP > 170-180s  -DASH diet   -outpatient follow-up with cardiology for  titration of HTN medications for optimized management         Hypertensive urgency  Pt with no prior history of hypertension presenting with elevated /121 > 207/110 in the ED. Started on losartan 50mg and lopressor 25 BID for blood pressure control. Received IV hydralazine 5mg and sublingual nitroglycerin x3 this AM with improved blood pressure 128/82.     RECOMMENDATIONS:  -treat HTN with goal for 25% reduction in 24 hours with eventual goal for SBP<130  -will buy BP cuff himself once discharged, we also talked about weight reduction and low Na diet, he is very motivated to lose weight  -check lipid panel and hba1c  -optimization of antihypertensive medications on discharge  -inpatient regimen currently consisting of losartan 50mg daily, hydrochlorothiazide 25mg daily, metoprolol succinate 50mg daily  -outpatient follow-up with cardiology for optimization of antihypertensive regimen  -consider PRN hydralazine for systolic blood pressures > 170-180s   -DASH diet          VTE Risk Mitigation (From admission, onward)         Ordered     heparin (porcine) injection  As needed (PRN)         08/17/23 1552     heparin infusion 1,000 units/500 ml in 0.9% NaCl (on sterile field)  As needed (PRN)         08/17/23 1533     Place sequential compression device  Until discontinued         08/17/23 0117     IP VTE HIGH RISK PATIENT  Once         08/17/23 0120     Reason for No Pharmacological VTE Prophylaxis  Once        Question:  Reasons:  Answer:  Physician Provided (leave comment)  Comment:  on heparin gtt    08/17/23 0120     Place sequential compression device  Until discontinued         08/17/23 0117     heparin (porcine) in 5 % dex 25,000 unit/250 mL(100 unit/mL) infusion         08/17/23 0043                Thank you for your consult. Cardiology will sign-off. Please contact us if you have any additional questions.    Monica Subramanian MD  Cardiology  [facility]

## 2023-08-18 NOTE — PLAN OF CARE
[facility]  Discharge Final Note    Primary Care Provider: Anthony Wyman MD    Expected Discharge Date: 8/18/2023    Final Discharge Note (most recent)       Final Note - 08/18/23 1430          Final Note    Assessment Type Final Discharge Note     Anticipated Discharge Disposition Home or Self Care     What phone number can be called within the next 1-3 days to see how you are doing after discharge? [phone]     Hospital Resources/Appts/Education Provided Provided patient/caregiver with written discharge plan information   per bedside nurse       Post-Acute Status    Discharge Delays None known at this time                     Important Message from Medicare      Patient medically ready for discharge to home. This CM scheduled or requested necessary hospital follow-up appointments.  Family/patient aware of discharge.    Future Appointments   Date Time Provider Department Center   8/25/2023  8:00 AM Hermilo Nina MD [facility] CARDIO [facility] RN CM  Case Management  [id number]

## 2023-08-18 NOTE — PLAN OF CARE
Problem: Adult Inpatient Plan of Care  Goal: Plan of Care Review  Outcome: Ongoing, Progressing  Goal: Patient-Specific Goal (Individualized)  Outcome: Ongoing, Progressing  Goal: Absence of Hospital-Acquired Illness or Injury  Outcome: Ongoing, Progressing  Goal: Optimal Comfort and Wellbeing  Outcome: Ongoing, Progressing  Goal: Readiness for Transition of Care  Outcome: Ongoing, Progressing     Problem: Adjustment to Illness (Acute Coronary Syndrome)  Goal: Optimal Adaptation to Illness  Outcome: Ongoing, Progressing     Problem: Dysrhythmia (Acute Coronary Syndrome)  Goal: Normalized Cardiac Rhythm  Outcome: Ongoing, Progressing     Problem: Cardiac-Related Pain (Acute Coronary Syndrome)  Goal: Absence of Cardiac-Related Pain  Outcome: Ongoing, Progressing     Problem: Hemodynamic Instability (Acute Coronary Syndrome)  Goal: Effective Cardiac Pump Function  Outcome: Ongoing, Progressing     Problem: Tissue Perfusion (Acute Coronary Syndrome)  Goal: Adequate Tissue Perfusion  Outcome: Ongoing, Progressing     Problem: Arrhythmia/Dysrhythmia (Cardiac Catheterization)  Goal: Stable Heart Rate and Rhythm  Outcome: Ongoing, Progressing     Problem: Bleeding (Cardiac Catheterization)  Goal: Absence of Bleeding  Outcome: Ongoing, Progressing     Problem: Contrast-Induced Injury Risk (Cardiac Catheterization)  Goal: Absence of Contrast-Induced Injury  Outcome: Ongoing, Progressing     Problem: Embolism (Cardiac Catheterization)  Goal: Absence of Embolism Signs and Symptoms  Outcome: Ongoing, Progressing     Problem: Ongoing Anesthesia/Sedation Effects (Cardiac Catheterization)  Goal: Anesthesia/Sedation Recovery  Outcome: Ongoing, Progressing     Problem: Pain (Cardiac Catheterization)  Goal: Acceptable Pain Control  Outcome: Ongoing, Progressing     Problem: Vascular Access Protection (Cardiac Catheterization)  Goal: Absence of Vascular Access Complication  Outcome: Ongoing, Progressing     Problem: Infection  Goal:  Absence of Infection Signs and Symptoms  Outcome: Ongoing, Progressing     Problem: Fall Injury Risk  Goal: Absence of Fall and Fall-Related Injury  Outcome: Ongoing, Progressing

## 2023-08-18 NOTE — DISCHARGE SUMMARY
Discharge Summary  Hospital Medicine  [facility]      Attending Physician on Discharge: Dk Shaffer MD  [place]ospital Medicine [place] [facility]  Date of Admission:  8/16/2023     Date of Discharge:  8/18/2023  Code status: Full Code    Active Hospital Problems    Diagnosis  POA    *NSTEMI (non-ST elevated myocardial infarction) [I21.4]  Yes    Hypertensive urgency [I16.0]  Yes    KAUSHAL (obstructive sleep apnea) [G47.33]  Yes      Resolved Hospital Problems   No resolved problems to display.       Consults: IC, cardiology     History of Present Illness:  Naldo Barakat is a 58 y.o. male with a PMHx of obesity, GERD, KAUSHAL who [place] [facility] [place]. Patient reports acute onset substernal chest pain described as a tightness/ pressure began while playing tennis earlier today. No associated radiation but he did have some tingling to his bilateral forearms. He felt short of breath while playing tennis tonight but no more than usual which he attributes to being out of shape. He took 2 full-strength aspirin prior to arrival. He was given nitro in the ED which didn't improve his pain but made him feel strange. Endorses some relief with morphine to 2/10 but pain has returned to 5/10 at the time of my exam. He has gained some weight and has been eating unhealthy food over the past 5-6 months. Reports blood pressure reading was elevated to 140s a few weeks ago which was abnormal for him as BP was normal previously. Denies fever, chills, LE swelling/pain, HA, vision changes, diaphoresis, jaw pain, or syncope.      ED: hypertensive to /121, vitals otherwise stable. No leukocytosis or electrolyte abnormalities. BNP 23, Trop 0.066. EKG without acute ST/T. Started on heparin gtt.         Overview/Hospital Course:  Trop increased to 1.6 overnight, with continued chest pressure. Cardiology recommended continuing ACS protocol and IC consulted for [facility]. No significant stenosis was found,  "troponinemia thought to be type II.      Hospital Course by Problem:  NSTEMI (non-ST elevated myocardial infarction)  Suspect Type 2 NSTEMI  - trop 0.066 on admission, peak 1.9  - EKG without acute ST/T wave changes  - [facility] 8/17 without significant ischemia, cardiology recommended no antiplatelets at dc  - lipitor 40mg daily     Hypertensive urgency  - BP uncontrolled on admit, possibly cause of chest pain and elevated trop  - Losartan 50mg daily  - HCTZ 25mg daily  - Metoprolol succinate 50mg daily  - PRN hydralazine 25mg BID for SBP >170  - cardiology follow up  - given the rapid onset of HTN consider evaluation for secondary causes as outpatient     KAUSHAL (obstructive sleep apnea)  - CPAP qhs     Laboratory Values:  Recent Labs   Lab 08/17/23  0614 08/17/23  2330 08/18/23  0455   WBC 7.05  7.05 8.32 7.28   HGB 13.4*  13.4* 14.4 14.0   HCT 39.6*  39.6* 43.1 42.1     230 255 227     Recent Labs   Lab 08/16/23  2308 08/17/23  0614 08/18/23  0455    143 141   K 4.2 3.9 4.3    111* 110   CO2 22* 23 22*   BUN 18 15 11   CREATININE 1.3 1.2 1.2    112* 105   CALCIUM 9.1 8.5* 8.7   MG  --  1.8 1.9   PHOS  --  3.7 3.1     Recent Labs   Lab 08/16/23  2308 08/17/23  0018 08/17/23  0801   ALKPHOS 68  --   --    ALT 32  --   --    AST 28  --   --    ALBUMIN 4.1  --   --    PROT 7.3  --   --    BILITOT 0.4  --   --    INR  --  1.0 1.0      No results for input(s): "POCTGLUCOSE" in the last 168 hours.  Recent Labs     08/17/23  0614 08/17/23  1119 08/17/23  1459   TROPONINI 1.603* 1.919* 1.696*         Microbiology:  Microbiology Results (last 7 days)       ** No results found for the last 168 hours. **            Imaging:  Imaging Results              X-Ray Chest PA And Lateral (Final result)  Result time 08/17/23 00:23:47      Final result by Yaniv Pearson MD (08/17/23 00:23:47)                   Impression:      No acute cardiopulmonary finding.      Electronically signed by: Yaniv Pearson, " "MD  Date:    08/17/2023  Time:    00:23               Narrative:    EXAMINATION:  XR CHEST PA AND LATERAL    CLINICAL HISTORY:  Provided history is "  Chest pain, unspecified".    TECHNIQUE:  Frontal and lateral views of the chest were performed.    COMPARISON:  08/01/2022.    FINDINGS:  Cardiac silhouette is not enlarged. Lung volumes are relatively low, accentuating basilar markings.  No confluent area of consolidation.  No sizable pleural effusion.  No pneumothorax.                                          Cardiac:  ECG Results              Repeat EKG 12-lead (Final result)  Result time 08/17/23 12:57:05      Final result by Interface, Lab In [facility] (08/17/23 12:57:05)                   Narrative:    Test Reason : I21.4,    Vent. Rate : 054 BPM     Atrial Rate : 054 BPM     P-R Int : 162 ms          QRS Dur : 086 ms      QT Int : 434 ms       P-R-T Axes : 040 015 020 degrees     QTc Int : 411 ms    Sinus bradycardia  Otherwise normal ECG  When compared with ECG of 17-AUG-2023 01:06,  No significant change was found  Confirmed by NATY TONEY MD ([id number]) on 8/17/2023 12:56:55 PM    Referred By: [email or web address]   SELF           Confirmed By:NATY TONEY MD                                     EKG 12-lead (Final result)  Result time 08/17/23 08:42:04      Final result by Interface, Lab In [facility] (08/17/23 08:42:04)                   Narrative:    Test Reason : R07.9,    Vent. Rate : 084 BPM     Atrial Rate : 084 BPM     P-R Int : 158 ms          QRS Dur : 084 ms      QT Int : 364 ms       P-R-T Axes : 050 010 021 degrees     QTc Int : 430 ms    Normal sinus rhythm  Low precordial R wave  Probably normal ECG  When compared with ECG of 10-NOV-2018 02:52,  Nonspecific T wave abnormality, improved in Inferior leads  Confirmed by NATY TONEY MD ([id number]) on 8/17/2023 8:41:54 AM    Referred By: [email or web address]   SELF           Confirmed By:NATY TONEY MD                                    Results for orders placed during the " hospital encounter of 08/16/23    Echo    Interpretation Summary    Technically challenging study with poor endocardial visualization.    Left Ventricle: The left ventricle is normal in size. Normal wall thickness. Regional wall motion abnormalities present, see diagram for wall motion findings. There is normal systolic function with a visually estimated ejection fraction of 60 - 65%. There is normal diastolic function.    Right Ventricle: Normal right ventricular cavity size. Wall thickness is normal. Right ventricle wall motion  is normal. Systolic function is normal.    Aorta: Aortic root is mildly dilated measuring 4.2 cm. Ascending aorta is mildly dilated measuring 3.9 cm.    IVC/SVC: Normal venous pressure at 3 mmHg.        Procedures:  Procedure(s) (LRB):  Left heart cath (Left)        Discharge Medication List as of 8/18/2023  1:41 PM        START taking these medications    Details   hydrALAZINE (APRESOLINE) 25 MG tablet Take 1 tablet (25 mg total) by mouth every 12 (twelve) hours as needed (As need for systolic blood pressure >170)., Starting Fri 8/18/2023, Until Sat 8/17/2024 at 2359, Normal      hydroCHLOROthiazide (HYDRODIURIL) 25 MG tablet Take 1 tablet (25 mg total) by mouth once daily., Starting Sat 8/19/2023, Until Sun 8/18/2024, Normal      losartan (COZAAR) 50 MG tablet Take 1 tablet (50 mg total) by mouth once daily., Starting Sat 8/19/2023, Until Sun 8/18/2024, Normal      metoprolol succinate (TOPROL-XL) 50 MG 24 hr tablet Take 1 tablet (50 mg total) by mouth once daily., Starting Sat 8/19/2023, Until Sun 8/18/2024, Normal           CONTINUE these medications which have CHANGED    Details   atorvastatin (LIPITOR) 80 MG tablet Take 0.5 tablets (40 mg total) by mouth once daily., Starting Sat 8/19/2023, Until Sun 8/18/2024, Normal           CONTINUE these medications which have NOT CHANGED    Details   azelastine (ASTELIN) 137 mcg (0.1 %) nasal spray 2 sprays (274 mcg total) by Nasal route 2  (two) times daily., Starting Tue 4/11/2023, Normal      famotidine (PEPCID) 20 MG tablet Take 1 tablet (20 mg total) by mouth nightly as needed for Heartburn., Starting Thu 4/6/2023, Until Sun 4/16/2023 at 2359, Normal      guaiFENesin-codeine 100-10 mg/5 ml (TUSSI-ORGANIDIN NR)  mg/5 mL syrup Take 5 mLs by mouth every 4 (four) hours as needed for Cough., Starting Thu 4/6/2023, Normal           STOP taking these medications       amoxicillin (AMOXIL) 875 MG tablet Comments:   Reason for Stopping:         aspirin 81 MG Chew Comments:   Reason for Stopping:         ketoconazole (NIZORAL) 2 % cream Comments:   Reason for Stopping:         predniSONE (DELTASONE) 20 MG tablet Comments:   Reason for Stopping:                 Discharge Diet:cardiac diet with Normal Fluid intake of 1500 - 2000 mL per day    Activity: activity as tolerated    Discharge Condition: Good    Disposition: Home or Self Care    Follow up:        Tests pending at the time of discharge: none        Time spent  on the discharge of the patient including review of hospital course with the patient. reviewing discharge medications and arranging follow-up care: >30 mins    Discharge examination: Patient was seen and examined on the date of discharge and determined to be suitable for discharge.        Dk Shaffer M.D.  Department of Hospital Medicine  [facility]

## 2023-08-18 NOTE — SUBJECTIVE & OBJECTIVE
Interval History: No acute events overnight. Pt s/p C yesterday with non-obstructive CAD. Pt is doing well this morning without complaints of chest pain, shortness of breath, nausea/vomiting. Blood pressure slightly improved this AM at 142/94.     Review of Systems   Constitutional: Negative for chills, fever and malaise/fatigue.   Eyes:  Negative for blurred vision and double vision.   Cardiovascular:  Negative for chest pain, dyspnea on exertion, leg swelling and palpitations.   Respiratory:  Negative for shortness of breath and wheezing.    Gastrointestinal:  Negative for abdominal pain, constipation, diarrhea, nausea and vomiting.   Neurological:  Negative for headaches and light-headedness.     Objective:     Vital Signs (Most Recent):  Temp: 98.5 °F (36.9 °C) (08/18/23 1059)  Pulse: 81 (08/18/23 1122)  Resp: 18 (08/18/23 1059)  BP: (!) 150/92 (08/18/23 1059)  SpO2: 97 % (08/18/23 1059) Vital Signs (24h Range):  Temp:  [97.9 °F (36.6 °C)-98.7 °F (37.1 °C)] 98.5 °F (36.9 °C)  Pulse:  [51-81] 81  Resp:  [16-18] 18  SpO2:  [93 %-97 %] 97 %  BP: (136-152)/() 150/92     Weight: 115.2 kg (253 lb 15.5 oz)  Body mass index is 35.42 kg/m².     SpO2: 97 %       No intake or output data in the 24 hours ending 08/18/23 1321    Lines/Drains/Airways       Peripheral Intravenous Line  Duration                  Peripheral IV - Single Lumen 08/16/23 2309 20 G Left Antecubital 1 day         Peripheral IV - Single Lumen 08/17/23 0107 22 G Anterior;Right Forearm 1 day                       Physical Exam  Vitals and nursing note reviewed.   Constitutional:       General: He is not in acute distress.     Appearance: Normal appearance. He is not ill-appearing or toxic-appearing.   HENT:      Head: Normocephalic and atraumatic.      Mouth/Throat:      Mouth: Mucous membranes are moist.      Pharynx: Oropharynx is clear.   Eyes:      Conjunctiva/sclera: Conjunctivae normal.   Cardiovascular:      Rate and Rhythm: Normal rate  and regular rhythm.      Pulses: Normal pulses.      Heart sounds: Normal heart sounds. No murmur heard.     No friction rub. No gallop.   Pulmonary:      Effort: Pulmonary effort is normal. No respiratory distress.      Breath sounds: Normal breath sounds. No stridor. No wheezing or rales.   Abdominal:      General: Abdomen is flat.      Palpations: Abdomen is soft.   Musculoskeletal:         General: No swelling.   Skin:     General: Skin is warm and dry.      Findings: No bruising.   Neurological:      General: No focal deficit present.      Mental Status: He is alert and oriented to person, place, and time.   Psychiatric:         Mood and Affect: Mood normal.         Behavior: Behavior normal.            Significant Labs: BMP:   Recent Labs   Lab 08/16/23  2308 08/17/23  0614 08/18/23  0455    112* 105    143 141   K 4.2 3.9 4.3    111* 110   CO2 22* 23 22*   BUN 18 15 11   CREATININE 1.3 1.2 1.2   CALCIUM 9.1 8.5* 8.7   MG  --  1.8 1.9   , CBC   Recent Labs   Lab 08/17/23  0614 08/17/23  2330 08/18/23  0455   WBC 7.05  7.05 8.32 7.28   HGB 13.4*  13.4* 14.4 14.0   HCT 39.6*  39.6* 43.1 42.1     230 255 227   , and Troponin   Recent Labs   Lab 08/17/23  0614 08/17/23  1119 08/17/23  1459   TROPONINI 1.603* 1.919* 1.696*       Significant Imaging:     Cardiac Cath: non-obstructive CAD    Results for orders placed during the hospital encounter of 08/16/23    Echo    Interpretation Summary    Technically challenging study with poor endocardial visualization.    Left Ventricle: The left ventricle is normal in size. Normal wall thickness. Regional wall motion abnormalities present, see diagram for wall motion findings. There is normal systolic function with a visually estimated ejection fraction of 60 - 65%. There is normal diastolic function.    Right Ventricle: Normal right ventricular cavity size. Wall thickness is normal. Right ventricle wall motion  is normal. Systolic function is  normal.    Aorta: Aortic root is mildly dilated measuring 4.2 cm. Ascending aorta is mildly dilated measuring 3.9 cm.    IVC/SVC: Normal venous pressure at 3 mmHg.

## 2023-08-21 ENCOUNTER — PATIENT OUTREACH (OUTPATIENT)
Dept: ADMINISTRATIVE | Facility: CLINIC | Age: 58
End: 2023-08-21
Payer: COMMERCIAL

## 2023-08-21 NOTE — TELEPHONE ENCOUNTER
Atorvastatin 80mg tablet 0.5 tablet once daily. Pt states Rx bottle reflects 80mg QD. Pt states new paper Rx for ASA, states d/c summary listed to Start.  @ [phone] high priority secure chat to Dk Shaffer MD regarding Atorvastatin and ASA verification. MD advised cardiology made late recs, will update d/c summary to reflect Atorvastatin 40mg QD and D/C ASA. Advised pt, pt vu.

## 2023-08-21 NOTE — PROGRESS NOTES
C3 nurse attempted to contact Naldo Barakat  for a TCC post hospital discharge follow up call. No answer. Left voicemail with callback information. The patient does not have a scheduled HOSFU appointment. Message sent to PCP staff for assistance with scheduling visit with patient.

## 2023-08-24 ENCOUNTER — TELEPHONE (OUTPATIENT)
Dept: CARDIOLOGY | Facility: CLINIC | Age: 58
End: 2023-08-24
Payer: COMMERCIAL

## 2023-08-24 DIAGNOSIS — I25.2 FOLLOW-UP OF ACUTE HEART ATTACK: Primary | ICD-10-CM

## 2023-08-24 DIAGNOSIS — Z09 FOLLOW-UP OF ACUTE HEART ATTACK: Primary | ICD-10-CM

## 2023-08-24 PROBLEM — I10 HYPERTENSION: Status: ACTIVE | Noted: 2023-08-24

## 2023-08-24 PROBLEM — I22.2: Status: ACTIVE | Noted: 2023-08-24

## 2023-08-24 PROBLEM — E78.5 HYPERLIPIDEMIA: Status: ACTIVE | Noted: 2023-08-24

## 2023-08-24 NOTE — PROGRESS NOTES
Chart has been dictated using voice recognition software.  It is not been reviewed carefully for any transcriptional errors due to this technology.   Subjective:   Patient ID:  Naldo Barakat is a 58 y.o. male who presents for evaluation of hx of heart attack      HPI:  Patient with obesity, hypertension, GERD, obstructive sleep apnea was admitted 24-33-Bxctaf-2023 with substernal chest pressure associated with an elevation in troponin.  Patient underwent cardiac angiography which showed nonocclusive coronary artery disease.  The patient was classified as a type 2 myocardial infarction and treated with good blood pressure control and referral for cardiac rehabilitation.    Patient denies any chest discomfort on exertion or at rest.  Patient denies any dyspnea at rest or on exertion, orthopnea, PND, or edema.  Patient denies any palpitations, lightheadedness, or syncope. Patient's home BP is 125-130/85-90.    Cardiac risk factors: hyperlipidemia, hypertension    Past Medical History:   Diagnosis Date    Allergy     Eczema        Past Surgical History:   Procedure Laterality Date    COLONOSCOPY N/A 2/15/2016    Procedure: COLONOSCOPY;  Surgeon: STONE Sanchez MD;  Location: [facility] ENDO ([address] [place] [address]);  Service: Endoscopy;  Laterality: N/A;  PM Prep    HERNIA REPAIR      LEFT HEART CATHETERIZATION Left 8/17/2023    Procedure: Left heart cath;  Surgeon: Sanchez Burden MD;  Location: [facility] CATH LAB;  Service: Cardiology;  Laterality: Left;       Social History     Tobacco Use    Smoking status: Never    Smokeless tobacco: Never   Substance Use Topics    Alcohol use: Yes     Comment: limited       Outpatient Medications Prior to Visit   Medication Sig Dispense Refill    atorvastatin (LIPITOR) 80 MG tablet Take 0.5 tablets (40 mg total) by mouth once daily. 45 tablet 3    hydrALAZINE (APRESOLINE) 25 MG tablet Take 1 tablet (25 mg total) by mouth every 12 (twelve) hours as needed (As need for systolic blood pressure >170).  "90 tablet 3    hydroCHLOROthiazide (HYDRODIURIL) 25 MG tablet Take 1 tablet (25 mg total) by mouth once daily. 30 tablet 11    losartan (COZAAR) 50 MG tablet Take 1 tablet (50 mg total) by mouth once daily. 90 tablet 3    metoprolol succinate (TOPROL-XL) 50 MG 24 hr tablet Take 1 tablet (50 mg total) by mouth once daily. 30 tablet 11    azelastine (ASTELIN) 137 mcg (0.1 %) nasal spray 2 sprays (274 mcg total) by Nasal route 2 (two) times daily. (Patient not taking: Reported on 8/21/2023) 30 mL 1    famotidine (PEPCID) 20 MG tablet Take 1 tablet (20 mg total) by mouth nightly as needed for Heartburn. 10 tablet 11    guaiFENesin-codeine 100-10 mg/5 ml (TUSSI-ORGANIDIN NR)  mg/5 mL syrup Take 5 mLs by mouth every 4 (four) hours as needed for Cough. (Patient not taking: Reported on 8/21/2023) 240 mL 0     Facility-Administered Medications Prior to Visit   Medication Dose Route Frequency Provider Last Rate Last Admin    acetaminophen tablet 650 mg  650 mg Oral Once PRN Percle, Tina A., NP        albuterol inhaler 2 puff  2 puff Inhalation Q20 Min PRN Percle, Tina A., NP        EPINEPHrine (EPIPEN) 0.3 mg/0.3 mL pen injection 0.3 mg  0.3 mg Intramuscular PRN Percle, Tina A., NP        ondansetron disintegrating tablet 4 mg  4 mg Oral Once PRN Percle, Tina A., NP           Review of patient's allergies indicates:   Allergen Reactions    Erythromycin     Macrolide antibiotics        ROS - No change from prior visit in neurologic, respiratory, endocrine, GI, hematologic, or constitutional complaints   Objective:   Physical Exam  Constitutional:       Appearance: He is well-developed.      Comments: /84   Pulse (!) 55   Ht 5' 11" (1.803 m)   Wt 114 kg (251 lb 5.2 oz)   SpO2 99%   BMI 35.05 kg/m²      Neck:      Vascular: No carotid bruit or JVD.   Cardiovascular:      Rate and Rhythm: Normal rate and regular rhythm.      Pulses: Intact distal pulses.      Heart sounds: Normal heart sounds. No " murmur heard.     No friction rub. No gallop.   Pulmonary:      Effort: Pulmonary effort is normal.      Breath sounds: Normal breath sounds. No wheezing or rales.   Abdominal:      General: Bowel sounds are normal. There is no abdominal bruit.      Palpations: Abdomen is soft. There is no hepatomegaly.      Tenderness: There is no abdominal tenderness.   Musculoskeletal:      Cervical back: Neck supple.      Right lower leg: No edema.      Left lower leg: No edema.   Skin:     Nails: There is no clubbing.   Neurological:      Mental Status: He is alert and oriented to person, place, and time.         Lab Results   Component Value Date    WBC 7.28 08/18/2023    HGB 14.0 08/18/2023    HCT 42.1 08/18/2023     (H) 08/18/2023     08/18/2023       Lab Results   Component Value Date     08/18/2023    K 4.3 08/18/2023    BUN 11 08/18/2023    CREATININE 1.2 08/18/2023     08/18/2023    HGBA1C 5.4 08/17/2023    CHOL 165 08/17/2023    HDL 45 08/17/2023    LDLCALC 104.8 08/17/2023    TRIG 76 08/17/2023    CHOLHDL 27.3 08/17/2023    HGB 14.0 08/18/2023    HCT 42.1 08/18/2023     08/18/2023    INR 1.0 08/17/2023     ECG (today) shows sinus bradycardia and was an otherwise normal electrocardiogram.  Assessment:     1. Subsequent non-ST elevation myocardial infarction (NSTEMI) within 4 weeks of initial infarction    2. Hypertension, unspecified type    3. Hyperlipidemia, unspecified hyperlipidemia type    4. Severe obesity (BMI 35.0-39.9) with comorbidity      Patient has no symptoms of cardiac ischemia, heart failure, or significant arrhythmias.  The patient will be sent to cardiac rehabilitation for supervised exercise therapy as well as instructions on risk factor management.  A repeat lipid profile will be obtained in 3 weeks.  His blood pressure is better controlled but may need more intensive therapy.  The patient will obtain his blood pressure twice a day for a week and then send me the  results.  He patient is also been referred to the hypertension digital Medicine program.  The patient is starting to exercise more will concentrate on healthy diet to help him lose weight.    Unless there are intervening problems, patient should return for re-evaluation in 5 months.       Plan:     Naldo was seen today for hx of heart attack.    Diagnoses and all orders for this visit:    Subsequent non-ST elevation myocardial infarction (NSTEMI) within 4 weeks of initial infarction  -     Cardiac rehab phase ii; Future  -     EKG 12-lead; Future    Hypertension, unspecified type  -     Hypertension Digital Medicine (HDMP) Enrollment Order  -     [facility] Assign Onboarding Questionnaires    Hyperlipidemia, unspecified hyperlipidemia type  -     Lipid Panel; Future; Expected date: 09/18/2023    Severe obesity (BMI 35.0-39.9) with comorbidity          Hermilo Nina MD  Consultative Cardiology

## 2023-08-25 ENCOUNTER — OFFICE VISIT (OUTPATIENT)
Dept: CARDIOLOGY | Facility: CLINIC | Age: 58
End: 2023-08-25
Payer: COMMERCIAL

## 2023-08-25 VITALS
HEART RATE: 55 BPM | HEIGHT: 71 IN | WEIGHT: 251.31 LBS | DIASTOLIC BLOOD PRESSURE: 84 MMHG | SYSTOLIC BLOOD PRESSURE: 119 MMHG | BODY MASS INDEX: 35.18 KG/M2 | OXYGEN SATURATION: 99 %

## 2023-08-25 DIAGNOSIS — E66.01 SEVERE OBESITY (BMI 35.0-39.9) WITH COMORBIDITY: ICD-10-CM

## 2023-08-25 DIAGNOSIS — I21.4 NSTEMI (NON-ST ELEVATED MYOCARDIAL INFARCTION): ICD-10-CM

## 2023-08-25 DIAGNOSIS — I10 HYPERTENSION, UNSPECIFIED TYPE: ICD-10-CM

## 2023-08-25 DIAGNOSIS — I22.2: Primary | ICD-10-CM

## 2023-08-25 DIAGNOSIS — E78.5 HYPERLIPIDEMIA, UNSPECIFIED HYPERLIPIDEMIA TYPE: ICD-10-CM

## 2023-08-25 PROCEDURE — 1111F PR DISCHARGE MEDS RECONCILED W/ CURRENT OUTPATIENT MED LIST: ICD-10-PCS | Mod: CPTII,S$GLB,, | Performed by: INTERNAL MEDICINE

## 2023-08-25 PROCEDURE — 3079F PR MOST RECENT DIASTOLIC BLOOD PRESSURE 80-89 MM HG: ICD-10-PCS | Mod: CPTII,S$GLB,, | Performed by: INTERNAL MEDICINE

## 2023-08-25 PROCEDURE — 3074F SYST BP LT 130 MM HG: CPT | Mod: CPTII,S$GLB,, | Performed by: INTERNAL MEDICINE

## 2023-08-25 PROCEDURE — 93000 ELECTROCARDIOGRAM COMPLETE: CPT | Mod: S$GLB,,, | Performed by: INTERNAL MEDICINE

## 2023-08-25 PROCEDURE — 3044F PR MOST RECENT HEMOGLOBIN A1C LEVEL <7.0%: ICD-10-PCS | Mod: CPTII,S$GLB,, | Performed by: INTERNAL MEDICINE

## 2023-08-25 PROCEDURE — 3044F HG A1C LEVEL LT 7.0%: CPT | Mod: CPTII,S$GLB,, | Performed by: INTERNAL MEDICINE

## 2023-08-25 PROCEDURE — 3008F PR BODY MASS INDEX (BMI) DOCUMENTED: ICD-10-PCS | Mod: CPTII,S$GLB,, | Performed by: INTERNAL MEDICINE

## 2023-08-25 PROCEDURE — 1159F MED LIST DOCD IN RCRD: CPT | Mod: CPTII,S$GLB,, | Performed by: INTERNAL MEDICINE

## 2023-08-25 PROCEDURE — 93000 EKG 12-LEAD: ICD-10-PCS | Mod: S$GLB,,, | Performed by: INTERNAL MEDICINE

## 2023-08-25 PROCEDURE — 3074F PR MOST RECENT SYSTOLIC BLOOD PRESSURE < 130 MM HG: ICD-10-PCS | Mod: CPTII,S$GLB,, | Performed by: INTERNAL MEDICINE

## 2023-08-25 PROCEDURE — 99999 PR PBB SHADOW E&M-EST. PATIENT-LVL V: CPT | Mod: PBBFAC,,, | Performed by: INTERNAL MEDICINE

## 2023-08-25 PROCEDURE — 99214 PR OFFICE/OUTPT VISIT, EST, LEVL IV, 30-39 MIN: ICD-10-PCS | Mod: S$GLB,,, | Performed by: INTERNAL MEDICINE

## 2023-08-25 PROCEDURE — 4010F PR ACE/ARB THEARPY RXD/TAKEN: ICD-10-PCS | Mod: CPTII,S$GLB,, | Performed by: INTERNAL MEDICINE

## 2023-08-25 PROCEDURE — 3008F BODY MASS INDEX DOCD: CPT | Mod: CPTII,S$GLB,, | Performed by: INTERNAL MEDICINE

## 2023-08-25 PROCEDURE — 1160F PR REVIEW ALL MEDS BY PRESCRIBER/CLIN PHARMACIST DOCUMENTED: ICD-10-PCS | Mod: CPTII,S$GLB,, | Performed by: INTERNAL MEDICINE

## 2023-08-25 PROCEDURE — 3079F DIAST BP 80-89 MM HG: CPT | Mod: CPTII,S$GLB,, | Performed by: INTERNAL MEDICINE

## 2023-08-25 PROCEDURE — 1111F DSCHRG MED/CURRENT MED MERGE: CPT | Mod: CPTII,S$GLB,, | Performed by: INTERNAL MEDICINE

## 2023-08-25 PROCEDURE — 99214 OFFICE O/P EST MOD 30 MIN: CPT | Mod: S$GLB,,, | Performed by: INTERNAL MEDICINE

## 2023-08-25 PROCEDURE — 1160F RVW MEDS BY RX/DR IN RCRD: CPT | Mod: CPTII,S$GLB,, | Performed by: INTERNAL MEDICINE

## 2023-08-25 PROCEDURE — 99999 PR PBB SHADOW E&M-EST. PATIENT-LVL V: ICD-10-PCS | Mod: PBBFAC,,, | Performed by: INTERNAL MEDICINE

## 2023-08-25 PROCEDURE — 1159F PR MEDICATION LIST DOCUMENTED IN MEDICAL RECORD: ICD-10-PCS | Mod: CPTII,S$GLB,, | Performed by: INTERNAL MEDICINE

## 2023-08-25 PROCEDURE — 4010F ACE/ARB THERAPY RXD/TAKEN: CPT | Mod: CPTII,S$GLB,, | Performed by: INTERNAL MEDICINE

## 2023-08-25 NOTE — PATIENT INSTRUCTIONS
Take your blood pressure each morning and evening for 1 week.  Record the blood pressure, pulse, date and time (AM or PM) and then send the the results all together at 1 time to Dr. Nina.    You need to get your cholesterol checked after a 12 hour fast.   No food or drink other than water, and any medication that needs to be taken, for 12 hours prior to the blood test.  You can eat as soon as you want after the sample blood sample is taken.

## 2023-08-25 NOTE — Clinical Note
Your patient, Naldo Barakat , was referred to me by [facility] for follow-up of a type 2 non ST elevation myocardial infarction. Please see my note for details of this encounter. If you have any questions, please contact me.  Thank you for allowing me to participate in care of this patient.

## 2023-08-31 ENCOUNTER — TELEPHONE (OUTPATIENT)
Dept: CARDIAC REHAB | Facility: CLINIC | Age: 58
End: 2023-08-31
Payer: COMMERCIAL

## 2023-09-19 ENCOUNTER — LAB VISIT (OUTPATIENT)
Dept: LAB | Facility: HOSPITAL | Age: 58
End: 2023-09-19
Attending: INTERNAL MEDICINE
Payer: COMMERCIAL

## 2023-09-19 DIAGNOSIS — E78.5 HYPERLIPIDEMIA, UNSPECIFIED HYPERLIPIDEMIA TYPE: ICD-10-CM

## 2023-09-19 LAB
CHOLEST SERPL-MCNC: 117 MG/DL (ref 120–199)
CHOLEST/HDLC SERPL: 2.9 {RATIO} (ref 2–5)
HDLC SERPL-MCNC: 40 MG/DL (ref 40–75)
HDLC SERPL: 34.2 % (ref 20–50)
LDLC SERPL CALC-MCNC: 65.8 MG/DL (ref 63–159)
NONHDLC SERPL-MCNC: 77 MG/DL
TRIGL SERPL-MCNC: 56 MG/DL (ref 30–150)

## 2023-09-19 PROCEDURE — 80061 LIPID PANEL: CPT | Performed by: INTERNAL MEDICINE

## 2023-09-19 PROCEDURE — 36415 COLL VENOUS BLD VENIPUNCTURE: CPT | Performed by: INTERNAL MEDICINE

## 2023-11-20 PROBLEM — I21.4 NSTEMI (NON-ST ELEVATED MYOCARDIAL INFARCTION): Status: RESOLVED | Noted: 2023-08-17 | Resolved: 2023-11-20

## 2023-11-22 ENCOUNTER — PATIENT MESSAGE (OUTPATIENT)
Dept: ADMINISTRATIVE | Facility: OTHER | Age: 58
End: 2023-11-22
Payer: COMMERCIAL

## 2023-11-27 PROBLEM — I22.2: Status: RESOLVED | Noted: 2023-08-24 | Resolved: 2023-11-27

## 2023-12-05 ENCOUNTER — PATIENT MESSAGE (OUTPATIENT)
Dept: INTERNAL MEDICINE | Facility: CLINIC | Age: 58
End: 2023-12-05
Payer: COMMERCIAL

## 2023-12-05 NOTE — TELEPHONE ENCOUNTER
Requesting paxlovid called into [facility] pharmacy for positive covid test. Him and wife katerina avalos.

## 2023-12-19 ENCOUNTER — OFFICE VISIT (OUTPATIENT)
Dept: INTERNAL MEDICINE | Facility: CLINIC | Age: 58
End: 2023-12-19
Payer: COMMERCIAL

## 2023-12-19 VITALS
OXYGEN SATURATION: 99 % | WEIGHT: 267.88 LBS | BODY MASS INDEX: 37.5 KG/M2 | HEIGHT: 71 IN | HEART RATE: 55 BPM | DIASTOLIC BLOOD PRESSURE: 82 MMHG | SYSTOLIC BLOOD PRESSURE: 122 MMHG

## 2023-12-19 DIAGNOSIS — M79.2 NEURALGIA INVOLVING SCALP: Primary | ICD-10-CM

## 2023-12-19 DIAGNOSIS — E66.01 SEVERE OBESITY (BMI 35.0-39.9) WITH COMORBIDITY: ICD-10-CM

## 2023-12-19 PROCEDURE — 3074F PR MOST RECENT SYSTOLIC BLOOD PRESSURE < 130 MM HG: ICD-10-PCS | Mod: CPTII,S$GLB,,

## 2023-12-19 PROCEDURE — 99999 PR PBB SHADOW E&M-EST. PATIENT-LVL III: ICD-10-PCS | Mod: PBBFAC,,,

## 2023-12-19 PROCEDURE — 3074F SYST BP LT 130 MM HG: CPT | Mod: CPTII,S$GLB,,

## 2023-12-19 PROCEDURE — 3079F DIAST BP 80-89 MM HG: CPT | Mod: CPTII,S$GLB,,

## 2023-12-19 PROCEDURE — 3008F BODY MASS INDEX DOCD: CPT | Mod: CPTII,S$GLB,,

## 2023-12-19 PROCEDURE — 3079F PR MOST RECENT DIASTOLIC BLOOD PRESSURE 80-89 MM HG: ICD-10-PCS | Mod: CPTII,S$GLB,,

## 2023-12-19 PROCEDURE — 99999 PR PBB SHADOW E&M-EST. PATIENT-LVL III: CPT | Mod: PBBFAC,,,

## 2023-12-19 PROCEDURE — 99213 PR OFFICE/OUTPT VISIT, EST, LEVL III, 20-29 MIN: ICD-10-PCS | Mod: S$GLB,,,

## 2023-12-19 PROCEDURE — 99213 OFFICE O/P EST LOW 20 MIN: CPT | Mod: S$GLB,,,

## 2023-12-19 PROCEDURE — 3008F PR BODY MASS INDEX (BMI) DOCUMENTED: ICD-10-PCS | Mod: CPTII,S$GLB,,

## 2023-12-19 NOTE — PROGRESS NOTES
[facility]   Clinic Note    Patient Name: Naldo Barakat   YOB: 1965     SUBJECTIVE:     Chief Complaint: Neuralgia of scalp    History of Present Illness:  Mr. Naldo Barakat is a 58 y.o. male with history of CAD, NSTEMI, HTN, and KAUSHAL who presents today with complaint of scalp neuralgia. Patient reports he has had two days of sharp, shooting pains on the right side of his scalp. Patient has never had pain like this on his head before; he denies any burning sensations. The sensations are relatively short-lasting and more frequent in the mornings on waking, and at night when he is going to bed. He is concerned that it may be shingles, given that his father had previously had shingles that initially presented with just the neuralgia before the rash appeared. He reports a history of chickenpox. Additionally, he reports feeling a small bump at the back of his head that was non-painful that he has been picking at. It has persisted due to continued picking and occasionally has bled. It is not currently painful or swollen. Of note, reports recent COVID infection on 12/5, which he is now recovered from but is having some lingering URT symptoms for including rhinorrhea. Otherwise, ROS is unremarkable; he denies any headache, eye pain or visual changes.    Review of Systems   Constitutional:  Negative for chills and fever.   HENT:  Negative for congestion and sore throat.    Eyes:  Negative for double vision and photophobia.   Respiratory:  Negative for cough and shortness of breath.    Cardiovascular:  Negative for chest pain and palpitations.   Gastrointestinal:  Negative for abdominal pain and vomiting.   Genitourinary:  Negative for dysuria and urgency.   Musculoskeletal:  Negative for myalgias and neck pain.   Neurological:  Positive for sensory change. Negative for dizziness and weakness.   Psychiatric/Behavioral:  Negative for depression. The patient is not nervous/anxious.        PAST  HISTORY:     Past Medical History:   Diagnosis Date    Allergy     CAD (coronary artery disease)     Eczema     Essential (primary) hypertension     NSTEMI (non-ST elevated myocardial infarction)        Past Surgical History:   Procedure Laterality Date    COLONOSCOPY N/A 2/15/2016    Procedure: COLONOSCOPY;  Surgeon: STONE Sanchez MD;  Location: [facility] ([address] [place] [address]);  Service: Endoscopy;  Laterality: N/A;  PM Prep    HERNIA REPAIR      LEFT HEART CATHETERIZATION Left 8/17/2023    Procedure: Left heart cath;  Surgeon: Sanchez Burden MD;  Location: [facility];  Service: Cardiology;  Laterality: Left;       Family History   Problem Relation Age of Onset    Cancer Mother         lung    Breast cancer Mother     Cancer Father         liver    Lung disease Sister     No Known Problems Brother     No Known Problems Sister     Asthma Neg Hx        Social History     Socioeconomic History    Marital status:     Number of children: 2   Tobacco Use    Smoking status: Never    Smokeless tobacco: Never   Substance and Sexual Activity    Alcohol use: Yes     Comment: limited     Social Determinants of Health     Financial Resource Strain: Low Risk  (8/17/2023)    Overall Financial Resource Strain (CARDIA)     Difficulty of Paying Living Expenses: Not hard at all   Food Insecurity: No Food Insecurity (8/17/2023)    Hunger Vital Sign     Worried About Running Out of Food in the Last Year: Never true     Ran Out of Food in the Last Year: Never true   Transportation Needs: No Transportation Needs (8/17/2023)    PRAPARE - Transportation     Lack of Transportation (Medical): No     Lack of Transportation (Non-Medical): No   Physical Activity: Insufficiently Active (8/17/2023)    Exercise Vital Sign     Days of Exercise per Week: 1 day     Minutes of Exercise per Session: 60 min   Stress: No Stress Concern Present (8/17/2023)    [employer] - Occupational Stress Questionnaire     Feeling  of Stress : Not at all   Social Connections: Socially Integrated (8/17/2023)    Social Connection and Isolation Panel [NHANES]     Frequency of Communication with Friends and Family: More than three times a week     Frequency of Social Gatherings with Friends and Family: More than three times a week     Attends Druze Services: More than 4 times per year     Active Member of Clubs or Organizations: Yes     Attends Club or Organization Meetings: More than 4 times per year     Marital Status:    Housing Stability: Low Risk  (8/17/2023)    Housing Stability Vital Sign     Unable to Pay for Housing in the Last Year: No     Number of Places Lived in the Last Year: 1     Unstable Housing in the Last Year: No       MEDICATIONS & ALLERGIES:     Current Outpatient Medications on File Prior to Visit   Medication Sig    atorvastatin (LIPITOR) 80 MG tablet Take 0.5 tablets (40 mg total) by mouth once daily.    azelastine (ASTELIN) 137 mcg (0.1 %) nasal spray 2 sprays (274 mcg total) by Nasal route 2 (two) times daily. (Patient not taking: Reported on 8/21/2023)    famotidine (PEPCID) 20 MG tablet Take 1 tablet (20 mg total) by mouth nightly as needed for Heartburn.    guaiFENesin-codeine 100-10 mg/5 ml (TUSSI-ORGANIDIN NR)  mg/5 mL syrup Take 5 mLs by mouth every 4 (four) hours as needed for Cough. (Patient not taking: Reported on 8/21/2023)    hydrALAZINE (APRESOLINE) 25 MG tablet Take 1 tablet (25 mg total) by mouth every 12 (twelve) hours as needed (As need for systolic blood pressure >170).    hydroCHLOROthiazide (HYDRODIURIL) 25 MG tablet Take 1 tablet (25 mg total) by mouth once daily.    losartan (COZAAR) 50 MG tablet Take 1 tablet (50 mg total) by mouth once daily.    metoprolol succinate (TOPROL-XL) 50 MG 24 hr tablet Take 1 tablet (50 mg total) by mouth once daily.     Current Facility-Administered Medications on File Prior to Visit   Medication    acetaminophen tablet 650 mg    albuterol inhaler 2  "puff    EPINEPHrine (EPIPEN) 0.3 mg/0.3 mL pen injection 0.3 mg    ondansetron disintegrating tablet 4 mg       Review of patient's allergies indicates:   Allergen Reactions    Erythromycin     Macrolide antibiotics        OBJECTIVE:     Vital Signs:  Vitals:    12/19/23 1456   BP: 122/82   BP Location: Right arm   Patient Position: Sitting   BP Method: Large (Manual)   Pulse: (!) 55   SpO2: 99%   Weight: 121.5 kg (267 lb 13.7 oz)   Height: 5' 11" (1.803 m)       Body mass index is 37.36 kg/m².     Physical Exam  Vitals reviewed.   Constitutional:       Appearance: Normal appearance.   HENT:      Head: Normocephalic and atraumatic.        Comments: Small raised lesion on right occiput, scabbed. Erythematous but nontender to palpation and non-fluctuant.  Scalp is otherwise clear and without any rash or other lesions.     Nose: No congestion or rhinorrhea.   Eyes:      Extraocular Movements: Extraocular movements intact.      Pupils: Pupils are equal, round, and reactive to light.   Cardiovascular:      Rate and Rhythm: Normal rate and regular rhythm.      Pulses: Normal pulses.      Heart sounds: Normal heart sounds.   Pulmonary:      Effort: Pulmonary effort is normal. No respiratory distress.      Breath sounds: Normal breath sounds.   Abdominal:      General: Abdomen is flat.      Palpations: Abdomen is soft.      Tenderness: There is no abdominal tenderness.   Musculoskeletal:         General: No tenderness. Normal range of motion.   Skin:     General: Skin is warm.      Findings: No rash.   Neurological:      Mental Status: He is alert and oriented to person, place, and time. Mental status is at baseline.   Psychiatric:         Mood and Affect: Mood normal.         Behavior: Behavior normal.         Laboratory  Lab Results   Component Value Date    WBC 7.28 08/18/2023    HGB 14.0 08/18/2023    HCT 42.1 08/18/2023     (H) 08/18/2023     08/18/2023     BMP  Lab Results   Component Value Date    NA " 141 08/18/2023    K 4.3 08/18/2023     08/18/2023    CO2 22 (L) 08/18/2023    BUN 11 08/18/2023    CREATININE 1.2 08/18/2023    CALCIUM 8.7 08/18/2023    ANIONGAP 9 08/18/2023    EGFRNORACEVR >60.0 08/18/2023     Lab Results   Component Value Date    INR 1.0 08/17/2023    INR 1.0 08/17/2023     Lab Results   Component Value Date    HGBA1C 5.4 08/17/2023         Health Maintenance Due   Topic Date Due    HIV Screening  Never done    Shingles Vaccine (1 of 2) Never done    Colorectal Cancer Screening  02/22/2021    RSV Vaccine (Age 60+ and Pregnant patients) (1 - 1-dose 60+ series) Never done    COVID-19 Vaccine (6 - 2023-24 season) 09/01/2023       ASSESSMENT & PLAN:   Mr. Naldo Barakat is a 58 y.o. male w/PMH of CAD, NSTEMI, KAUSHAL, and HTN who presents with 2 days of scalp neuralgia.  -     Unclear etiology for his presentation. His description of the sensation certainly sounds like some type of neuralgia, but I am not certain what the cause is. Shingles could certainly present with neuralgia preceding a visible rash, and so I recommended just watching and waiting for skin involvement prior to starting antiviral therapy. His scalp involvement could represent a rarer form of trigeminal neuralgia, affecting only the V1 branch, though this is noted to be much less common than the classic V2/V3 involvement. Finally, this could also be a presentation of SUNCT, though his history and presentation are not entirely consistent with minimal autonomic symptoms asides from rhinorrhea that may be a result of COVID.   - For the time being, recommending watching and waiting. If his symptoms do not improve, and his sharp pains persist and no rash appears, would favor trialing low-dose Carbamazepine and seeing if symptoms improve.  - He is concerned if the lesion at the back of his head may be related to these pains. The lesion appears maybe more likely a keratosis that he has picked at and so has caused some skin breakage. While  it is erythematous, it is not particularly tender to palpation and I do not feel any fluctuance concerning for infection. I said it would be reasonable to try a topical cream like Neosporin if he was concerned, but I felt that it was likely not related to his neuralgia.    Neuralgia involving scalp        Return to clinic as needed.    Patient was discussed with Dr. Gonzales.    Angelito Watts MD  Internal Medicine, PGY-2  [facility]

## 2024-01-03 NOTE — PROGRESS NOTES
Preceptor note    Patient's history and physical discussed, please refer to resident physician's note for specific details.  I agree with resident's assessment and plan.  Obesity noted -- continue to work on with Anthony Lugo MD

## 2024-02-15 ENCOUNTER — PATIENT MESSAGE (OUTPATIENT)
Dept: ADMINISTRATIVE | Facility: OTHER | Age: 59
End: 2024-02-15
Payer: COMMERCIAL

## 2024-02-15 ENCOUNTER — PATIENT MESSAGE (OUTPATIENT)
Dept: OTHER | Facility: OTHER | Age: 59
End: 2024-02-15
Payer: COMMERCIAL

## 2024-02-22 ENCOUNTER — OFFICE VISIT (OUTPATIENT)
Dept: OPTOMETRY | Facility: CLINIC | Age: 59
End: 2024-02-22
Payer: COMMERCIAL

## 2024-02-22 DIAGNOSIS — H47.239 LARGE PHYSIOLOGIC CUPPING OF OPTIC DISC: ICD-10-CM

## 2024-02-22 DIAGNOSIS — H25.13 CATARACT, NUCLEAR SCLEROTIC, BOTH EYES: ICD-10-CM

## 2024-02-22 DIAGNOSIS — H52.13 MYOPIA WITH PRESBYOPIA OF BOTH EYES: ICD-10-CM

## 2024-02-22 DIAGNOSIS — H52.4 MYOPIA WITH PRESBYOPIA OF BOTH EYES: ICD-10-CM

## 2024-02-22 DIAGNOSIS — Z01.00 EYE EXAM, ROUTINE: Primary | ICD-10-CM

## 2024-02-22 PROCEDURE — 92004 COMPRE OPH EXAM NEW PT 1/>: CPT | Mod: S$GLB,,, | Performed by: OPTOMETRIST

## 2024-02-22 PROCEDURE — 92015 DETERMINE REFRACTIVE STATE: CPT | Mod: S$GLB,,, | Performed by: OPTOMETRIST

## 2024-02-22 PROCEDURE — 99999 PR PBB SHADOW E&M-EST. PATIENT-LVL III: CPT | Mod: PBBFAC,,, | Performed by: OPTOMETRIST

## 2024-02-22 NOTE — PROGRESS NOTES
HPI    MARI: 12/21/2020    Chief complaint (CC): 59 yo M presents for annual comprehensive eye exam.   Pt denies any other ocular or visual complaints today.     Glasses? 3+ years SV Distance  Contacts? -  H/o eye surgery, injections or laser: -  H/o eye injury: -  Known eye conditions? -  Family h/o eye conditions? -  Eye gtts? -      (-) Flashes (-)  Floaters (-) Mucous   (-)  Tearing (-) Itching (-) Burning   (-) Headaches (-) Eye Pain/discomfort (-) Irritation   (-)  Redness (-) Double vision (+) Blurry vision    Diabetic? -  A1c? Lab Results       Component                Value               Date                       HGBA1C                   5.4                 08/17/2023                 Last edited by Caroline Sepulveda, OD on 2/22/2024  8:37 AM.            Assessment /Plan     For exam results, see Encounter Report.        Eye exam, routine  Myopia with presbyopia of both eyes   - New Spectacle Rx given, discussed different options for glasses. RTC 1 year for routine eye exam.    Cataract, nuclear sclerotic, both eyes   - Not visually significant. Monitor.    Large physiologic cupping of optic disc, left eye   - ONH asymmetry OS>OD; larger ONH OS>OD  - IOP normotensive OU (12/14)   - Monitor annually

## 2024-03-25 ENCOUNTER — OFFICE VISIT (OUTPATIENT)
Dept: PRIMARY CARE CLINIC | Facility: CLINIC | Age: 59
End: 2024-03-25
Payer: COMMERCIAL

## 2024-03-25 ENCOUNTER — NURSE TRIAGE (OUTPATIENT)
Dept: ADMINISTRATIVE | Facility: CLINIC | Age: 59
End: 2024-03-25
Payer: COMMERCIAL

## 2024-03-25 DIAGNOSIS — J06.9 URTI (ACUTE UPPER RESPIRATORY INFECTION): Primary | ICD-10-CM

## 2024-03-25 PROCEDURE — 4010F ACE/ARB THERAPY RXD/TAKEN: CPT | Mod: CPTII,95,, | Performed by: INTERNAL MEDICINE

## 2024-03-25 PROCEDURE — 99214 OFFICE O/P EST MOD 30 MIN: CPT | Mod: 95,,, | Performed by: INTERNAL MEDICINE

## 2024-03-25 RX ORDER — CODEINE PHOSPHATE AND GUAIFENESIN 10; 100 MG/5ML; MG/5ML
5 SOLUTION ORAL EVERY 6 HOURS PRN
Qty: 100 ML | Refills: 0 | Status: SHIPPED | OUTPATIENT
Start: 2024-03-25

## 2024-03-25 RX ORDER — METHYLPREDNISOLONE 4 MG/1
TABLET ORAL
Qty: 21 EACH | Refills: 0 | Status: SHIPPED | OUTPATIENT
Start: 2024-03-25 | End: 2024-04-15

## 2024-03-25 RX ORDER — AMOXICILLIN AND CLAVULANATE POTASSIUM 875; 125 MG/1; MG/1
1 TABLET, FILM COATED ORAL EVERY 12 HOURS
Qty: 20 TABLET | Refills: 0 | Status: SHIPPED | OUTPATIENT
Start: 2024-03-25

## 2024-03-25 NOTE — PROGRESS NOTES
[facility] Virtual Visit Note    The patient location is: [place]  The chief complaint leading to consultation is: cough, wheezing  Visit type: Virtual visit with synchronous audio and video  Total time spent with patient: 20 min  Each patient to whom he or she provides medical services by telemedicine is:  (1) informed of the relationship between the physician and patient and the respective role of any other health care provider with respect to management of the patient; and (2) notified that he or she may decline to receive medical services by telemedicine and may withdraw from such care at any time.      Chief Complaint    No chief complaint on file.      History of Present Illness      Naldo Barakat is a 58 y.o. male with chronic conditions of CAD, HTN, KAUSHAL who presents today for: Complaints of cough and wheezing for the past 3 weeks.  Severity has been progressing.  Cough has been nonproductive mostly, occasionally slightly productive.  Coughing severity has started to cause headaches.  Also seeing epistaxis.  Denies fevers, chills.  Chest congestion/wheezing mostly at night.      Past Medical History:  Past Medical History:   Diagnosis Date    Allergy     CAD (coronary artery disease)     Eczema     Essential (primary) hypertension     NSTEMI (non-ST elevated myocardial infarction)        Past Surgical History:   has a past surgical history that includes Colonoscopy (N/A, 2/15/2016); Hernia repair; and Left heart catheterization (Left, 8/17/2023).    Family History:  family history includes Breast cancer in his mother; Cancer in his father and mother; Lung disease in his sister; No Known Problems in his brother and sister.     Social History:  Social History     Tobacco Use    Smoking status: Never    Smokeless tobacco: Never   Substance Use Topics    Alcohol use: Yes     Comment: limited       Review of Systems   Constitutional:  Negative for chills, fever and weight loss.   HENT:  Negative for ear  pain and sore throat.    Respiratory:  Positive for cough and wheezing. Negative for hemoptysis and shortness of breath.    Cardiovascular:  Negative for chest pain.   Gastrointestinal:  Negative for heartburn.   Musculoskeletal:  Negative for myalgias.   Skin:  Negative for rash.   Neurological:  Positive for headaches.   Endo/Heme/Allergies:  Negative for environmental allergies.        Medications:  Outpatient Encounter Medications as of 3/25/2024   Medication Sig Dispense Refill    amoxicillin-clavulanate 875-125mg (AUGMENTIN) 875-125 mg per tablet Take 1 tablet by mouth every 12 (twelve) hours. 20 tablet 0    atorvastatin (LIPITOR) 80 MG tablet Take 0.5 tablets (40 mg total) by mouth once daily. 45 tablet 3    azelastine (ASTELIN) 137 mcg (0.1 %) nasal spray 2 sprays (274 mcg total) by Nasal route 2 (two) times daily. 30 mL 1    guaiFENesin-codeine 100-10 mg/5 ml (TUSSI-ORGANIDIN NR)  mg/5 mL syrup Take 5 mLs by mouth every 6 (six) hours as needed for Cough. 100 mL 0    hydrALAZINE (APRESOLINE) 25 MG tablet Take 1 tablet (25 mg total) by mouth every 12 (twelve) hours as needed (As need for systolic blood pressure >170). 90 tablet 3    hydroCHLOROthiazide (HYDRODIURIL) 25 MG tablet Take 1 tablet (25 mg total) by mouth once daily. 30 tablet 11    losartan (COZAAR) 50 MG tablet Take 1 tablet (50 mg total) by mouth once daily. 90 tablet 3    methylPREDNISolone (MEDROL DOSEPACK) 4 mg tablet use as directed 21 each 0    metoprolol succinate (TOPROL-XL) 50 MG 24 hr tablet Take 1 tablet (50 mg total) by mouth once daily. 30 tablet 11    [DISCONTINUED] guaiFENesin-codeine 100-10 mg/5 ml (TUSSI-ORGANIDIN NR)  mg/5 mL syrup Take 5 mLs by mouth every 4 (four) hours as needed for Cough. 240 mL 0     Facility-Administered Encounter Medications as of 3/25/2024   Medication Dose Route Frequency Provider Last Rate Last Admin    acetaminophen tablet 650 mg  650 mg Oral Once PRN Tina Cordova, NP        albuterol  inhaler 2 puff  2 puff Inhalation Q20 Min PRN Tina Cordova NP        EPINEPHrine (EPIPEN) 0.3 mg/0.3 mL pen injection 0.3 mg  0.3 mg Intramuscular PRN Tina Cordova NP        ondansetron disintegrating tablet 4 mg  4 mg Oral Once PRN Tina Cordova, FORTINO           Allergies:  Review of patient's allergies indicates:   Allergen Reactions    Erythromycin     Macrolide antibiotics        Health Maintenance:  Immunization History   Administered Date(s) Administered    COVID-19, MRNA, LN-S, PF (Pfizer) (Purple Cap) 12/19/2020, 01/09/2021, 12/01/2021    COVID-19, mRNA, LNP-S, bivalent booster, PF (PFIZER OMICRON) 10/07/2022, 04/26/2023    DTaP 09/20/2010    Hepatitis A, Adult 12/05/2012    Influenza 10/23/2018, 09/15/2019    Influenza - Quadrivalent - PF *Preferred* (6 months and older) 10/12/2016, 10/11/2017, 10/18/2018, 09/16/2019, 09/23/2020, 10/07/2022    Td (ADULT) 01/26/2019      Health Maintenance   Topic Date Due    Shingles Vaccine (1 of 2) Never done    Colorectal Cancer Screening  02/22/2021    Lipid Panel  09/19/2028    TETANUS VACCINE  01/26/2029    Hepatitis C Screening  Completed        Physical Exam       ]    Physical Exam  Constitutional:       General: He is not in acute distress.     Appearance: He is well-developed. He is not diaphoretic.   HENT:      Head: Normocephalic and atraumatic.   Eyes:      General: No scleral icterus.        Right eye: No discharge.         Left eye: No discharge.      Conjunctiva/sclera: Conjunctivae normal.   Pulmonary:      Effort: Pulmonary effort is normal. No respiratory distress.   Neurological:      Mental Status: He is alert and oriented to person, place, and time.   Psychiatric:         Behavior: Behavior normal.         Thought Content: Thought content normal.         Judgment: Judgment normal.          Laboratory:  CBC:  Recent Labs   Lab 08/17/23  0614 08/17/23  2330 08/18/23  0455   WBC 7.05  7.05 8.32 7.28   RBC 4.05 L  4.05 L 4.34 L 4.22 L    Hemoglobin 13.4 L  13.4 L 14.4 14.0   Hematocrit 39.6 L  39.6 L 43.1 42.1   Platelets 230  230 255 227   MCV 98  98 99 H 100 H   MCH 33.1 H  33.1 H 33.2 H 33.2 H   MCHC 33.8  33.8 33.4 33.3     CMP:  Recent Labs   Lab 08/16/23  2308 08/17/23  0614 08/18/23  0455   Glucose 108   < > 105   Calcium 9.1   < > 8.7   Albumin 4.1  --   --    Total Protein 7.3  --   --    Sodium 144   < > 141   Potassium 4.2   < > 4.3   CO2 22 L   < > 22 L   Chloride 108   < > 110   BUN 18   < > 11   Alkaline Phosphatase 68  --   --    ALT 32  --   --    AST 28  --   --    Total Bilirubin 0.4  --   --     < > = values in this interval not displayed.     URINALYSIS:       LIPIDS:  Recent Labs   Lab 08/17/23  0614 09/19/23  1013   HDL 45 40   Cholesterol 165 117 L   Triglycerides 76 56   LDL Cholesterol 104.8 65.8   HDL/Cholesterol Ratio 27.3 34.2   Non-HDL Cholesterol 120 77   Total Cholesterol/HDL Ratio 3.7 2.9     TSH:      A1C:  Recent Labs   Lab 08/17/23  0614   Hemoglobin A1C 5.4       Assessment/Plan     Naldo Barakat is a 58 y.o.male with:    1. URTI (acute upper respiratory infection)  - methylPREDNISolone (MEDROL DOSEPACK) 4 mg tablet; use as directed  Dispense: 21 each; Refill: 0  - amoxicillin-clavulanate 875-125mg (AUGMENTIN) 875-125 mg per tablet; Take 1 tablet by mouth every 12 (twelve) hours.  Dispense: 20 tablet; Refill: 0  - guaiFENesin-codeine 100-10 mg/5 ml (TUSSI-ORGANIDIN NR)  mg/5 mL syrup; Take 5 mLs by mouth every 6 (six) hours as needed for Cough.  Dispense: 100 mL; Refill: 0   3 weeks of cough, severity progressively worsening.  Will give antibiotic and steroid, cough suppressant.  Call back if not improving after this treatment regimen.    Chronic conditions status updated as per HPI.  Other than changes above, cont current medications and maintain follow up with specialists.  No follow-ups on file.    No future appointments.    Mark Anthony Benjamin MD  [facility]                  Answers  submitted by the patient for this visit:  Cough Questionnaire (Submitted on 3/25/2024)  Chief Complaint: Cough  Chronicity: new  Onset: 1 to 4 weeks ago  Progression since onset: gradually worsening  Frequency: constantly  Cough characteristics: productive of sputum  ear congestion: No  nasal congestion: Yes  postnasal drip: Yes  rhinorrhea: Yes  sweats: No  Aggravated by: nothing  asthma: No  bronchiectasis: No  bronchitis: Yes  COPD: No  emphysema: No  pneumonia: Yes  Treatments tried: OTC cough suppressant  Improvement on treatment: mild

## 2024-03-25 NOTE — TELEPHONE ENCOUNTER
Persistent cough and a little wheezing last night. Cough for about 3 weeks. Blood in sinus cavity last night when he blew his nose. Care advice recommend pt see MD within 4 hours. Pt offered and accepted virtual visit.   Reason for Disposition   Wheezing is present    Additional Information   Negative: Bluish (or gray) lips or face   Negative: SEVERE difficulty breathing (e.g., struggling for each breath, speaks in single words)   Negative: Rapid onset of cough and has hives   Negative: Coughing started suddenly after medicine, an allergic food or bee sting   Negative: Difficulty breathing after exposure to flames, smoke, or fumes   Negative: Sounds like a life-threatening emergency to the triager   Negative: MODERATE difficulty breathing (e.g., speaks in phrases, SOB even at rest, pulse 100-120) and still present when not coughing   Negative: Chest pain present when not coughing   Negative: Passed out (i.e., fainted, collapsed and was not responding)   Negative: Patient sounds very sick or weak to the triager   Negative: MILD difficulty breathing (e.g., minimal/no SOB at rest, SOB with walking, pulse <100) and still present when not coughing   Negative: Coughed up > 1 tablespoon (15 ml) blood (Exception: Blood-tinged sputum.)   Negative: Fever > 103 F (39.4 C)   Negative: Fever > 101 F (38.3 C) and over 60 years of age   Negative: Fever > 100.0 F (37.8 C) and has diabetes mellitus or a weak immune system (e.g., HIV positive, cancer chemotherapy, organ transplant, splenectomy, chronic steroids)   Negative: Fever > 100.0 F (37.8 C) and bedridden (e.g., CVA, chronic illness, recovering from surgery)   Negative: Increasing ankle swelling    Protocols used: Cough-A-OH

## 2024-06-10 ENCOUNTER — PATIENT MESSAGE (OUTPATIENT)
Dept: INTERNAL MEDICINE | Facility: CLINIC | Age: 59
End: 2024-06-10
Payer: COMMERCIAL

## 2024-08-13 ENCOUNTER — PATIENT MESSAGE (OUTPATIENT)
Dept: PHARMACY | Facility: HOSPITAL | Age: 59
End: 2024-08-13
Payer: COMMERCIAL

## 2024-08-13 ENCOUNTER — PATIENT MESSAGE (OUTPATIENT)
Dept: ADMINISTRATIVE | Facility: OTHER | Age: 59
End: 2024-08-13
Payer: COMMERCIAL

## 2024-08-19 ENCOUNTER — PATIENT MESSAGE (OUTPATIENT)
Dept: ADMINISTRATIVE | Facility: HOSPITAL | Age: 59
End: 2024-08-19
Payer: COMMERCIAL

## 2024-08-30 ENCOUNTER — PATIENT MESSAGE (OUTPATIENT)
Dept: PHARMACY | Facility: HOSPITAL | Age: 59
End: 2024-08-30
Payer: COMMERCIAL

## 2024-09-03 ENCOUNTER — OFFICE VISIT (OUTPATIENT)
Dept: INTERNAL MEDICINE | Facility: CLINIC | Age: 59
End: 2024-09-03
Payer: COMMERCIAL

## 2024-09-03 VITALS
HEART RATE: 59 BPM | HEIGHT: 71 IN | BODY MASS INDEX: 37.69 KG/M2 | SYSTOLIC BLOOD PRESSURE: 134 MMHG | OXYGEN SATURATION: 98 % | WEIGHT: 269.19 LBS | DIASTOLIC BLOOD PRESSURE: 80 MMHG

## 2024-09-03 DIAGNOSIS — H65.00 ACUTE SEROUS OTITIS MEDIA, RECURRENCE NOT SPECIFIED, UNSPECIFIED LATERALITY: ICD-10-CM

## 2024-09-03 DIAGNOSIS — R05.9 COUGH, UNSPECIFIED TYPE: ICD-10-CM

## 2024-09-03 DIAGNOSIS — Z76.89 ENCOUNTER FOR WEIGHT MANAGEMENT: ICD-10-CM

## 2024-09-03 DIAGNOSIS — J32.9 SINUSITIS, UNSPECIFIED CHRONICITY, UNSPECIFIED LOCATION: Primary | ICD-10-CM

## 2024-09-03 PROCEDURE — 99214 OFFICE O/P EST MOD 30 MIN: CPT | Mod: 25,S$GLB,, | Performed by: NURSE PRACTITIONER

## 2024-09-03 PROCEDURE — 1159F MED LIST DOCD IN RCRD: CPT | Mod: CPTII,S$GLB,, | Performed by: NURSE PRACTITIONER

## 2024-09-03 PROCEDURE — 3075F SYST BP GE 130 - 139MM HG: CPT | Mod: CPTII,S$GLB,, | Performed by: NURSE PRACTITIONER

## 2024-09-03 PROCEDURE — 3008F BODY MASS INDEX DOCD: CPT | Mod: CPTII,S$GLB,, | Performed by: NURSE PRACTITIONER

## 2024-09-03 PROCEDURE — 4010F ACE/ARB THERAPY RXD/TAKEN: CPT | Mod: CPTII,S$GLB,, | Performed by: NURSE PRACTITIONER

## 2024-09-03 PROCEDURE — 99999 PR PBB SHADOW E&M-EST. PATIENT-LVL IV: CPT | Mod: PBBFAC,,, | Performed by: NURSE PRACTITIONER

## 2024-09-03 PROCEDURE — 96372 THER/PROPH/DIAG INJ SC/IM: CPT | Mod: S$GLB,,, | Performed by: NURSE PRACTITIONER

## 2024-09-03 PROCEDURE — 3079F DIAST BP 80-89 MM HG: CPT | Mod: CPTII,S$GLB,, | Performed by: NURSE PRACTITIONER

## 2024-09-03 RX ORDER — AZELASTINE 1 MG/ML
1 SPRAY, METERED NASAL 2 TIMES DAILY
Qty: 30 ML | Refills: 2 | Status: SHIPPED | OUTPATIENT
Start: 2024-09-03 | End: 2025-09-03

## 2024-09-03 RX ORDER — IPRATROPIUM BROMIDE 21 UG/1
2 SPRAY, METERED NASAL 2 TIMES DAILY
Qty: 30 ML | Refills: 0 | Status: CANCELLED | OUTPATIENT
Start: 2024-09-03

## 2024-09-03 RX ORDER — AMOXICILLIN AND CLAVULANATE POTASSIUM 875; 125 MG/1; MG/1
1 TABLET, FILM COATED ORAL EVERY 12 HOURS
Qty: 20 TABLET | Refills: 0 | Status: SHIPPED | OUTPATIENT
Start: 2024-09-03

## 2024-09-03 RX ORDER — LEVOCETIRIZINE DIHYDROCHLORIDE 5 MG/1
TABLET, FILM COATED ORAL
Qty: 30 TABLET
Start: 2024-09-03

## 2024-09-03 RX ORDER — PROMETHAZINE HYDROCHLORIDE AND DEXTROMETHORPHAN HYDROBROMIDE 6.25; 15 MG/5ML; MG/5ML
5 SYRUP ORAL EVERY 6 HOURS PRN
Qty: 118 ML | Refills: 0 | Status: CANCELLED | OUTPATIENT
Start: 2024-09-03 | End: 2024-09-10

## 2024-09-03 RX ORDER — METHYLPREDNISOLONE 4 MG/1
TABLET ORAL
Qty: 21 EACH | Refills: 0 | Status: SHIPPED | OUTPATIENT
Start: 2024-09-03 | End: 2024-09-24

## 2024-09-03 RX ORDER — BETAMETHASONE SODIUM PHOSPHATE AND BETAMETHASONE ACETATE 3; 3 MG/ML; MG/ML
12 INJECTION, SUSPENSION INTRA-ARTICULAR; INTRALESIONAL; INTRAMUSCULAR; SOFT TISSUE ONCE
Status: COMPLETED | OUTPATIENT
Start: 2024-09-03 | End: 2024-09-03

## 2024-09-03 RX ORDER — BENZONATATE 100 MG/1
100 CAPSULE ORAL 3 TIMES DAILY PRN
Qty: 30 CAPSULE | Refills: 0 | Status: CANCELLED | OUTPATIENT
Start: 2024-09-03 | End: 2024-09-13

## 2024-09-03 RX ADMIN — BETAMETHASONE SODIUM PHOSPHATE AND BETAMETHASONE ACETATE 12 MG: 3; 3 INJECTION, SUSPENSION INTRA-ARTICULAR; INTRALESIONAL; INTRAMUSCULAR; SOFT TISSUE at 04:09

## 2024-09-03 NOTE — PROGRESS NOTES
INTERNAL MEDICINE CLINIC - SAME DAY APPOINTMENT  Progress Note    PRESENTING HISTORY     PCP: Anthony Wyman MD    Chief Complaint/Reason for Visit:   No chief complaint on file.    History of Present Illness & ROS : Mr. Naldo Barakat is a 59 y.o. male.    Same day apt  Very pleasant gentleman.   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   Reports that for the past 2-3 weeks has been having a nocturnal cough, dry, feeling feverish, nasal congestion, some mild ear pain (tried the nasal lavage as of last night, but 'felt may have only clogged with sensation of increase pressure to right ear')..reports that over the weekend, while on a flight to and from [place], he felt the 'pressure to the left ear'.  No SOB or Chest wall discomforts. No headaches or sore throat. No GI (viral) symptoms.   He has been taking Nyquil at night and seems to 'help some'.   He and his family are preparing for a week long trip to [facility], leaving on Friday and he is requesting 'steroid' shot or more to help with his symptoms.   *He did a home COVID test and was 'negative'.     Also, he is focused to 'weight loss' and request referral to a Nutritionist for weight management concerns, as he preferences to try 'natural' measures / approaches to manage his weight. (Referral placed today on behalf of Dr. Wyman and our nurse sent message to the in-box of Nutritionist).     Review of Systems:  Eyes: denies visual changes at this time denies floaters   Respiratory: no cough or shorness of breath  Cardiovascular: no chest pain or palpitations  Gastrointestinal: no nausea or vomiting, no abdominal pain or change in bowel habits  Genitourinary: no hematuria or dysuria; denies frequency  Hematologic/Lymphatic: no easy bruising or lymphadenopathy  Musculoskeletal: no arthralgias or myalgias  Neurological: no seizures or tremors  Endocrine: no heat or cold intolerance      PAST HISTORY:     Past Medical History:   Diagnosis Date     Allergy     CAD (coronary artery disease)     Eczema     Essential (primary) hypertension     NSTEMI (non-ST elevated myocardial infarction)        Past Surgical History:   Procedure Laterality Date    COLONOSCOPY N/A 2/15/2016    Procedure: COLONOSCOPY;  Surgeon: STONE Sanchez MD;  Location: [facility] ENDO (4TH FLR);  Service: Endoscopy;  Laterality: N/A;  PM Prep    HERNIA REPAIR      LEFT HEART CATHETERIZATION Left 8/17/2023    Procedure: Left heart cath;  Surgeon: Sanchez Burden MD;  Location: [facility] CATH LAB;  Service: Cardiology;  Laterality: Left;       Family History   Problem Relation Name Age of Onset    Cancer Mother          lung    Breast cancer Mother      Cancer Father          liver    Lung disease Sister      No Known Problems Brother      No Known Problems Sister      Asthma Neg Hx         Social History     Socioeconomic History    Marital status:     Number of children: 2   Tobacco Use    Smoking status: Never    Smokeless tobacco: Never   Substance and Sexual Activity    Alcohol use: Yes     Comment: limited     Social Determinants of Health     Financial Resource Strain: Low Risk  (8/17/2023)    Overall Financial Resource Strain (CARDIA)     Difficulty of Paying Living Expenses: Not hard at all   Food Insecurity: No Food Insecurity (8/17/2023)    Hunger Vital Sign     Worried About Running Out of Food in the Last Year: Never true     Ran Out of Food in the Last Year: Never true   Transportation Needs: No Transportation Needs (8/17/2023)    PRAPARE - Transportation     Lack of Transportation (Medical): No     Lack of Transportation (Non-Medical): No   Physical Activity: Insufficiently Active (8/17/2023)    Exercise Vital Sign     Days of Exercise per Week: 1 day     Minutes of Exercise per Session: 60 min   Stress: No Stress Concern Present (8/17/2023)    [employer] [place] [employer] - Occupational Stress Questionnaire     Feeling of Stress : Not at all   Housing  Stability: Low Risk  (8/17/2023)    Housing Stability Vital Sign     Unable to Pay for Housing in the Last Year: No     Number of Places Lived in the Last Year: 1     Unstable Housing in the Last Year: No       MEDICATIONS & ALLERGIES:     Current Outpatient Medications on File Prior to Visit   Medication Sig Dispense Refill    amoxicillin-clavulanate 875-125mg (AUGMENTIN) 875-125 mg per tablet Take 1 tablet by mouth every 12 (twelve) hours. 20 tablet 0    atorvastatin (LIPITOR) 80 MG tablet Take 0.5 tablets (40 mg total) by mouth once daily. 45 tablet 3    azelastine (ASTELIN) 137 mcg (0.1 %) nasal spray 2 sprays (274 mcg total) by Nasal route 2 (two) times daily. 30 mL 1    guaiFENesin-codeine 100-10 mg/5 ml (TUSSI-ORGANIDIN NR)  mg/5 mL syrup Take 5 mLs by mouth every 6 (six) hours as needed for Cough. 100 mL 0    hydrALAZINE (APRESOLINE) 25 MG tablet Take 1 tablet (25 mg total) by mouth every 12 (twelve) hours as needed (As need for systolic blood pressure >170). 90 tablet 3    hydroCHLOROthiazide (HYDRODIURIL) 25 MG tablet Take 1 tablet (25 mg total) by mouth once daily. 90 tablet 0    losartan (COZAAR) 50 MG tablet Take 1 tablet (50 mg total) by mouth once daily. 90 tablet 0    metoprolol succinate (TOPROL-XL) 50 MG 24 hr tablet Take 1 tablet (50 mg total) by mouth once daily. 90 tablet 0     Current Facility-Administered Medications on File Prior to Visit   Medication Dose Route Frequency Provider Last Rate Last Admin    acetaminophen tablet 650 mg  650 mg Oral Once PRN Percle, Tina A., NP        albuterol inhaler 2 puff  2 puff Inhalation Q20 Min PRN RyanleTina AChuck, NP        EPINEPHrine (EPIPEN) 0.3 mg/0.3 mL pen injection 0.3 mg  0.3 mg Intramuscular PRN RyanleTina AChuck, NP        ondansetron disintegrating tablet 4 mg  4 mg Oral Once PRN RyanleTina AChuck, NP            Review of patient's allergies indicates:   Allergen Reactions    Erythromycin     Macrolide antibiotics         Medications Reconciliation:   I have reconciled the patient's home medications with the patient/family. I have updated all changes.  Refer to After-Visit Medication List.    OBJECTIVE:     Vital Signs:  There were no vitals filed for this visit.  Wt Readings from Last 3 Encounters:   12/19/23 1456 121.5 kg (267 lb 13.7 oz)   08/25/23 0827 114 kg (251 lb 5.2 oz)   08/18/23 0750 115.2 kg (253 lb 15.5 oz)   08/17/23 0832 115.2 kg (254 lb)   08/17/23 0650 115.6 kg (254 lb 13.6 oz)   08/16/23 2155 115.7 kg (255 lb)     There is no height or weight on file to calculate BMI.       Wt Readings from Last 3 Encounters:   09/03/24 122.1 kg (269 lb 2.9 oz)   12/19/23 121.5 kg (267 lb 13.7 oz)   08/25/23 114 kg (251 lb 5.2 oz)     Temp Readings from Last 3 Encounters:   08/18/23 98.5 °F (36.9 °C) (Oral)   11/23/20 98.3 °F (36.8 °C)   05/27/20 98.3 °F (36.8 °C) (Oral)     BP Readings from Last 3 Encounters:   09/03/24 134/80   12/19/23 122/82   08/25/23 119/84     Pulse Readings from Last 3 Encounters:   09/03/24 (!) 59   12/19/23 (!) 55   08/25/23 (!) 55       Physical Exam:  General: Well developed, well nourished. No distress.  HEENT: Head is normocephalic, atraumatic  Right Ear: + moderate serous fluid and mild lacy erythema, no fluid bulge   Left Ear: unremarkable   Posterior oral Pharynx: no exudate or purulence, + mild erythema to vault   Eyes: Clear conjunctiva.  Neck: Supple, symmetrical neck; trachea midline.  Lungs: Clear to auscultation bilaterally and normal respiratory effort.  Cardiovascular: Heart with regular rate and rhythm. No murmurs, gallops or rubs  Skin: Skin color, texture, turgor normal. No rashes.  Musculoskeletal: Normal gait.   Lymph Nodes: No cervical or supraclavicular adenopathy.   Neuro: No focal numbness or weakness.       Laboratory  Lab Results   Component Value Date    WBC 7.28 08/18/2023    HGB 14.0 08/18/2023    HCT 42.1 08/18/2023     08/18/2023    CHOL 117 (L) 09/19/2023     TRIG 56 09/19/2023    HDL 40 09/19/2023    ALT 32 08/16/2023    AST 28 08/16/2023     08/18/2023    K 4.3 08/18/2023     08/18/2023    CREATININE 1.2 08/18/2023    BUN 11 08/18/2023    CO2 22 (L) 08/18/2023    TSH 1.910 09/25/2020    PSA 0.64 09/25/2020    INR 1.0 08/17/2023    HGBA1C 5.4 08/17/2023       ASSESSMENT & PLAN:     Same day apt.     Sinusitis, unspecified chronicity, unspecified location  Acute serous otitis media, recurrence not specified, unspecified laterality  Cough, unspecified type  -     POCT COVID-19 Rapid Screening (negative)  -     POCT Influenza A/B Rapid Antigen (negative)  -     amoxicillin-clavulanate 875-125mg (AUGMENTIN) 875-125 mg per tablet; Take 1 tablet by mouth every 12 (twelve) hours.  Dispense: 20 tablet; Refill: 0  -     methylPREDNISolone (MEDROL DOSEPACK) 4 mg tablet; use as directed  Dispense: 21 each; Refill: 0 (advised to started as 'bridging' if feels symptoms are lingering at 36-48 hours; but if not needed, to bring with him to \[facility]\"" in the event the flight  should challenge his current ear-issue)   -     azelastine (ASTELIN) 137 mcg (0.1 %) nasal spray; 1 spray (137 mcg total) by Nasal route 2 (two) times daily.  Dispense: 30 mL; Refill: 2 (recommend using nightly along with Xyzal for the next 5 days)  -     levocetirizine (XYZAL) 5 MG tablet; Take 1 tablet night x 5 days, then daily PRN  Dispense: 30 tablet  -     betamethasone acetate-betamethasone sodium phosphate injection 12 mg      Encounter for weight management  BMI 37.0-37.9, adult  -     Ambulatory referral/consult to Nutrition Services; Future; Expected date: 09/10/2024    *Have shared notations with his established PCP and encouraged to keep he, or I, posted with his response to prescribed if not improving.   *Given his history of HTN, recommend continued close monitoring during this time that he is not feeling well and taking medications that can potentially insult his BP.  He does have PRN  Hydralazine with instructions on how to take if needed.        Medication List            Accurate as of September 3, 2024  5:06 PM. If you have any questions, ask your nurse or doctor.                START taking these medications      levocetirizine 5 MG tablet  Commonly known as: XYZAL  Take 1 tablet night x 5 days, then daily PRN  Started by: KADI Granados     methylPREDNISolone 4 mg tablet  Commonly known as: MEDROL DOSEPACK  use as directed  Started by: KADI Granados            CHANGE how you take these medications      azelastine 137 mcg (0.1 %) nasal spray  Commonly known as: ASTELIN  1 spray (137 mcg total) by Nasal route 2 (two) times daily.  What changed: how much to take  Changed by: KADI Granados            CONTINUE taking these medications      amoxicillin-clavulanate 875-125mg 875-125 mg per tablet  Commonly known as: AUGMENTIN  Take 1 tablet by mouth every 12 (twelve) hours.     atorvastatin 80 MG tablet  Commonly known as: LIPITOR  Take 0.5 tablets (40 mg total) by mouth once daily.     hydrALAZINE 25 MG tablet  Commonly known as: APRESOLINE  Take 1 tablet (25 mg total) by mouth every 12 (twelve) hours as needed (As need for systolic blood pressure >170).     hydroCHLOROthiazide 25 MG tablet  Commonly known as: HYDRODIURIL  Take 1 tablet (25 mg total) by mouth once daily.     losartan 50 MG tablet  Commonly known as: COZAAR  Take 1 tablet (50 mg total) by mouth once daily.     metoprolol succinate 50 MG 24 hr tablet  Commonly known as: TOPROL-XL  Take 1 tablet (50 mg total) by mouth once daily.            STOP taking these medications      guaiFENesin-codeine 100-10 mg/5 ml  mg/5 mL syrup  Commonly known as: TUSSI-ORGANIDIN NR  Stopped by: KADI Granados               Where to Get Your Medications        These medications were sent to [facility]  [address] [place], [place] [address] [address]      Hours: Always Open Phone:  [phone]   amoxicillin-clavulanate 875-125mg 875-125 mg per tablet  azelastine 137 mcg (0.1 %) nasal spray  methylPREDNISolone 4 mg tablet       Information about where to get these medications is not yet available    Ask your nurse or doctor about these medications  levocetirizine 5 MG tablet       Signing Physician:  KADI Granados

## 2024-09-03 NOTE — PROGRESS NOTES
betamethasone acetate-betamethasone sodium phosphate injection 12 mg Injection given. Bandage applied. Tolerated well. Patient instructed to wait in lobby for 15 min before leaving.Information on medication given. Verbalized understanding.

## 2024-10-29 ENCOUNTER — HOSPITAL ENCOUNTER (OUTPATIENT)
Dept: PULMONOLOGY | Facility: CLINIC | Age: 59
Discharge: HOME | End: 2024-10-29
Payer: COMMERCIAL

## 2024-10-29 ENCOUNTER — LAB VISIT (OUTPATIENT)
Dept: LAB | Facility: HOSPITAL | Age: 59
End: 2024-10-29
Attending: INTERNAL MEDICINE
Payer: COMMERCIAL

## 2024-10-29 ENCOUNTER — PATIENT MESSAGE (OUTPATIENT)
Dept: INTERNAL MEDICINE | Facility: CLINIC | Age: 59
End: 2024-10-29

## 2024-10-29 ENCOUNTER — PATIENT MESSAGE (OUTPATIENT)
Dept: ALLERGY | Facility: CLINIC | Age: 59
End: 2024-10-29

## 2024-10-29 ENCOUNTER — OFFICE VISIT (OUTPATIENT)
Dept: ALLERGY | Facility: CLINIC | Age: 59
End: 2024-10-29
Payer: COMMERCIAL

## 2024-10-29 ENCOUNTER — OFFICE VISIT (OUTPATIENT)
Dept: INTERNAL MEDICINE | Facility: CLINIC | Age: 59
End: 2024-10-29
Payer: COMMERCIAL

## 2024-10-29 VITALS — WEIGHT: 264.56 LBS | HEIGHT: 71 IN | BODY MASS INDEX: 37.04 KG/M2

## 2024-10-29 VITALS
DIASTOLIC BLOOD PRESSURE: 86 MMHG | HEART RATE: 64 BPM | OXYGEN SATURATION: 97 % | TEMPERATURE: 99 F | SYSTOLIC BLOOD PRESSURE: 126 MMHG | BODY MASS INDEX: 36.88 KG/M2 | HEIGHT: 71 IN | WEIGHT: 263.44 LBS

## 2024-10-29 DIAGNOSIS — L98.9 SKIN LESION OF RIGHT ARM: Primary | ICD-10-CM

## 2024-10-29 DIAGNOSIS — R05.9 COUGH, UNSPECIFIED TYPE: Primary | ICD-10-CM

## 2024-10-29 DIAGNOSIS — J45.901 BRONCHITIS, ALLERGIC, UNSPECIFIED ASTHMA SEVERITY, WITH ACUTE EXACERBATION: ICD-10-CM

## 2024-10-29 DIAGNOSIS — J45.909 REACTIVE AIRWAY DISEASE WITHOUT COMPLICATION, UNSPECIFIED ASTHMA SEVERITY, UNSPECIFIED WHETHER PERSISTENT: ICD-10-CM

## 2024-10-29 DIAGNOSIS — R05.8 UPPER AIRWAY COUGH SYNDROME: ICD-10-CM

## 2024-10-29 DIAGNOSIS — J31.0 CHRONIC RHINITIS: Primary | ICD-10-CM

## 2024-10-29 DIAGNOSIS — K21.9 GASTROESOPHAGEAL REFLUX DISEASE, UNSPECIFIED WHETHER ESOPHAGITIS PRESENT: ICD-10-CM

## 2024-10-29 DIAGNOSIS — J45.909 REACTIVE AIRWAY DISEASE WITHOUT COMPLICATION, UNSPECIFIED ASTHMA SEVERITY, UNSPECIFIED WHETHER PERSISTENT: Primary | ICD-10-CM

## 2024-10-29 LAB
FEF 25 75 LLN: 1.86
FEF 25 75 PRE REF: 84.1 %
FEF 25 75 REF: 3.58
FEV05 LLN: 1.75
FEV05 REF: 2.89
FEV1 FVC LLN: 65
FEV1 FVC PRE REF: 98.5 %
FEV1 FVC REF: 77
FEV1 LLN: 2.74
FEV1 PRE REF: 90.2 %
FEV1 REF: 3.65
FEV1FVCZSCORE: -0.16
FEV1ZSCORE: -0.66
FVC LLN: 3.61
FVC PRE REF: 91.3 %
FVC REF: 4.75
FVCZSCORE: -0.59
IGA SERPL-MCNC: 150 MG/DL (ref 40–350)
IGG SERPL-MCNC: 970 MG/DL (ref 650–1600)
IGM SERPL-MCNC: 73 MG/DL (ref 50–300)
PEF LLN: 6.99
PEF PRE REF: 80.5 %
PEF REF: 9.38
PHYSICIAN COMMENT: NORMAL
PRE FEF 25 75: 3.01 L/S (ref 1.86–5.29)
PRE FET 100: 5.97 SEC
PRE FEV05 REF: 84.3 %
PRE FEV1 FVC: 75.97 % (ref 64.96–87.69)
PRE FEV1: 3.29 L (ref 2.74–4.51)
PRE FEV5: 2.44 L (ref 1.75–4.02)
PRE FVC: 4.34 L (ref 3.61–5.9)
PRE PEF: 7.55 L/S (ref 6.99–11.76)

## 2024-10-29 PROCEDURE — 3008F BODY MASS INDEX DOCD: CPT | Mod: CPTII,S$GLB,, | Performed by: STUDENT IN AN ORGANIZED HEALTH CARE EDUCATION/TRAINING PROGRAM

## 2024-10-29 PROCEDURE — 99999 PR PBB SHADOW E&M-EST. PATIENT-LVL IV: CPT | Mod: PBBFAC,,, | Performed by: INTERNAL MEDICINE

## 2024-10-29 PROCEDURE — 3008F BODY MASS INDEX DOCD: CPT | Mod: CPTII,S$GLB,, | Performed by: INTERNAL MEDICINE

## 2024-10-29 PROCEDURE — 99214 OFFICE O/P EST MOD 30 MIN: CPT | Mod: S$GLB,,, | Performed by: INTERNAL MEDICINE

## 2024-10-29 PROCEDURE — 86317 IMMUNOASSAY INFECTIOUS AGENT: CPT | Mod: 59 | Performed by: INTERNAL MEDICINE

## 2024-10-29 PROCEDURE — 4010F ACE/ARB THERAPY RXD/TAKEN: CPT | Mod: CPTII,S$GLB,, | Performed by: INTERNAL MEDICINE

## 2024-10-29 PROCEDURE — 1159F MED LIST DOCD IN RCRD: CPT | Mod: CPTII,S$GLB,, | Performed by: INTERNAL MEDICINE

## 2024-10-29 PROCEDURE — 94010 BREATHING CAPACITY TEST: CPT | Mod: S$GLB,,, | Performed by: INTERNAL MEDICINE

## 2024-10-29 PROCEDURE — 82784 ASSAY IGA/IGD/IGG/IGM EACH: CPT | Mod: 59 | Performed by: INTERNAL MEDICINE

## 2024-10-29 PROCEDURE — 3079F DIAST BP 80-89 MM HG: CPT | Mod: CPTII,S$GLB,, | Performed by: INTERNAL MEDICINE

## 2024-10-29 PROCEDURE — 36415 COLL VENOUS BLD VENIPUNCTURE: CPT | Performed by: INTERNAL MEDICINE

## 2024-10-29 PROCEDURE — 4010F ACE/ARB THERAPY RXD/TAKEN: CPT | Mod: CPTII,S$GLB,, | Performed by: STUDENT IN AN ORGANIZED HEALTH CARE EDUCATION/TRAINING PROGRAM

## 2024-10-29 PROCEDURE — 1160F RVW MEDS BY RX/DR IN RCRD: CPT | Mod: CPTII,S$GLB,, | Performed by: INTERNAL MEDICINE

## 2024-10-29 PROCEDURE — 1159F MED LIST DOCD IN RCRD: CPT | Mod: CPTII,S$GLB,, | Performed by: STUDENT IN AN ORGANIZED HEALTH CARE EDUCATION/TRAINING PROGRAM

## 2024-10-29 PROCEDURE — 99204 OFFICE O/P NEW MOD 45 MIN: CPT | Mod: S$GLB,,, | Performed by: STUDENT IN AN ORGANIZED HEALTH CARE EDUCATION/TRAINING PROGRAM

## 2024-10-29 PROCEDURE — 3074F SYST BP LT 130 MM HG: CPT | Mod: CPTII,S$GLB,, | Performed by: INTERNAL MEDICINE

## 2024-10-29 PROCEDURE — 99999 PR PBB SHADOW E&M-EST. PATIENT-LVL III: CPT | Mod: PBBFAC,,, | Performed by: STUDENT IN AN ORGANIZED HEALTH CARE EDUCATION/TRAINING PROGRAM

## 2024-10-29 RX ORDER — FLUTICASONE PROPIONATE 50 MCG
2 SPRAY, SUSPENSION (ML) NASAL 2 TIMES DAILY
Qty: 16 G | Refills: 11 | Status: SHIPPED | OUTPATIENT
Start: 2024-10-29 | End: 2025-10-29

## 2024-10-29 RX ORDER — DOXYCYCLINE HYCLATE 100 MG
100 TABLET ORAL 2 TIMES DAILY
Qty: 20 TABLET | Refills: 0 | Status: SHIPPED | OUTPATIENT
Start: 2024-10-29 | End: 2024-11-08

## 2024-10-29 RX ORDER — BUDESONIDE AND FORMOTEROL FUMARATE DIHYDRATE 160; 4.5 UG/1; UG/1
2 AEROSOL RESPIRATORY (INHALATION) EVERY 12 HOURS
Qty: 10.2 G | Refills: 0 | Status: SHIPPED | OUTPATIENT
Start: 2024-10-29

## 2024-10-29 RX ORDER — AZELASTINE 1 MG/ML
2 SPRAY, METERED NASAL 2 TIMES DAILY
Qty: 30 ML | Refills: 11 | Status: SHIPPED | OUTPATIENT
Start: 2024-10-29 | End: 2025-10-29

## 2024-10-29 RX ORDER — PROMETHAZINE HYDROCHLORIDE AND DEXTROMETHORPHAN HYDROBROMIDE 6.25; 15 MG/5ML; MG/5ML
5 SYRUP ORAL EVERY 4 HOURS PRN
Qty: 120 ML | Refills: 0 | Status: SHIPPED | OUTPATIENT
Start: 2024-10-29

## 2024-10-29 RX ORDER — FAMOTIDINE 40 MG/1
40 TABLET, FILM COATED ORAL DAILY
Qty: 30 TABLET | Refills: 11 | Status: SHIPPED | OUTPATIENT
Start: 2024-10-29 | End: 2025-10-29

## 2024-11-01 ENCOUNTER — NURSE TRIAGE (OUTPATIENT)
Dept: ADMINISTRATIVE | Facility: CLINIC | Age: 59
End: 2024-11-01
Payer: COMMERCIAL

## 2024-11-04 LAB
IMMUNOLOGIST REVIEW: NORMAL
S PN DA SERO 19F IGG SER-MCNC: 2.6 MCG/ML
S PNEUM DA 1 IGG SER-MCNC: 0.7 MCG/ML
S PNEUM DA 10A IGG SER-MCNC: 1.6 MCG/ML
S PNEUM DA 11A IGG SER-MCNC: 2.1 MCG/ML
S PNEUM DA 12F IGG SER-MCNC: NORMAL MCG/ML
S PNEUM DA 14 IGG SER-MCNC: 0.4 MCG/ML
S PNEUM DA 15B IGG SER-MCNC: 1.7 MCG/ML
S PNEUM DA 17F IGG SER-MCNC: 2.7 MCG/ML
S PNEUM DA 18C IGG SER-MCNC: 0.6 MCG/ML
S PNEUM DA 19A IGG SER-MCNC: 2.3 MCG/ML
S PNEUM DA 2 IGG SER-MCNC: 0.5 MCG/ML
S PNEUM DA 20A IGG SER-MCNC: 4 MCG/ML
S PNEUM DA 22F IGG SER-MCNC: 1.9 MCG/ML
S PNEUM DA 23F IGG SER-MCNC: 1.3 MCG/ML
S PNEUM DA 3 IGG SER-MCNC: 0.7 MCG/ML
S PNEUM DA 33F IGG SER-MCNC: 4.7 MCG/ML
S PNEUM DA 4 IGG SER-MCNC: 0.3 MCG/ML
S PNEUM DA 5 IGG SER-MCNC: 0.3 MCG/ML
S PNEUM DA 6B IGG SER-MCNC: 0.6 MCG/ML
S PNEUM DA 7F IGG SER-MCNC: 0.9 MCG/ML
S PNEUM DA 8 IGG SER-MCNC: 1.5 MCG/ML
S PNEUM DA 9N IGG SER-MCNC: 1.5 MCG/ML
S PNEUM DA 9V IGG SER-MCNC: 0.4 MCG/ML

## 2024-11-05 ENCOUNTER — PATIENT MESSAGE (OUTPATIENT)
Dept: INTERNAL MEDICINE | Facility: CLINIC | Age: 59
End: 2024-11-05
Payer: COMMERCIAL

## 2024-11-05 DIAGNOSIS — R05.9 COUGH, UNSPECIFIED TYPE: ICD-10-CM

## 2024-11-05 DIAGNOSIS — R05.3 CHRONIC COUGH: Primary | ICD-10-CM

## 2024-11-05 RX ORDER — PROMETHAZINE HYDROCHLORIDE AND DEXTROMETHORPHAN HYDROBROMIDE 6.25; 15 MG/5ML; MG/5ML
5 SYRUP ORAL EVERY 4 HOURS PRN
Qty: 120 ML | Refills: 0 | Status: SHIPPED | OUTPATIENT
Start: 2024-11-05 | End: 2024-11-06 | Stop reason: SDUPTHER

## 2024-11-05 NOTE — TELEPHONE ENCOUNTER
Pt is requesting a refill on his cough syrup. Rx pended  Also requesting results from the Streptococcus Pneumoniae AB and Immunoglobulin from 10/29/24

## 2024-11-06 RX ORDER — PROMETHAZINE HYDROCHLORIDE AND DEXTROMETHORPHAN HYDROBROMIDE 6.25; 15 MG/5ML; MG/5ML
5 SYRUP ORAL EVERY 4 HOURS PRN
Qty: 240 ML | Refills: 0 | Status: SHIPPED | OUTPATIENT
Start: 2024-11-06

## 2024-11-12 ENCOUNTER — HOSPITAL ENCOUNTER (OUTPATIENT)
Dept: RADIOLOGY | Facility: HOSPITAL | Age: 59
Discharge: HOME | End: 2024-11-12
Attending: INTERNAL MEDICINE
Payer: COMMERCIAL

## 2024-11-12 DIAGNOSIS — R05.3 CHRONIC COUGH: ICD-10-CM

## 2024-11-12 PROCEDURE — 71250 CT THORAX DX C-: CPT | Mod: 26,,, | Performed by: STUDENT IN AN ORGANIZED HEALTH CARE EDUCATION/TRAINING PROGRAM

## 2024-11-12 PROCEDURE — 71250 CT THORAX DX C-: CPT | Mod: TC

## 2024-11-16 ENCOUNTER — PATIENT MESSAGE (OUTPATIENT)
Dept: INTERNAL MEDICINE | Facility: CLINIC | Age: 59
End: 2024-11-16
Payer: COMMERCIAL

## 2024-11-18 ENCOUNTER — PATIENT MESSAGE (OUTPATIENT)
Dept: ADMINISTRATIVE | Facility: HOSPITAL | Age: 59
End: 2024-11-18
Payer: COMMERCIAL

## 2024-11-18 RX ORDER — BUDESONIDE AND FORMOTEROL FUMARATE DIHYDRATE 160; 4.5 UG/1; UG/1
2 AEROSOL RESPIRATORY (INHALATION) EVERY 12 HOURS
Qty: 30.6 G | Refills: 3 | Status: SHIPPED | OUTPATIENT
Start: 2024-11-18

## 2024-11-18 NOTE — TELEPHONE ENCOUNTER
Refill Routing Note   Medication(s) are not appropriate for processing by [facility] for the following reason(s):        New or recently adjusted medication    [facility] action(s):  Defer   Requires labs : Yes             Appointments  past 12m or future 3m with PCP    Date Provider   Last Visit   10/29/2024 Anthony Wyman MD   Next Visit   Visit date not found Anthony Wyman MD   ED visits in past 90 days: 0        Note composed:1:47 PM 11/18/2024

## 2024-11-18 NOTE — TELEPHONE ENCOUNTER
Care Due:                  Date            Visit Type   Department     Provider  --------------------------------------------------------------------------------                                MYCHART                              FOLLOWUP/OF  [facility] INTERNAL  Last Visit: 10-      FICE VISIT   MEDICINE       Anthony Wyman  Next Visit: None Scheduled  None         None Found                                                            Last  Test          Frequency    Reason                     Performed    Due Date  --------------------------------------------------------------------------------    Cr..........  12 months..  famotidine...............  08- 08-    [facility] Embedded Care Due Messages. Reference number: [id number].   11/18/2024 1:28:24 PM CST

## 2024-11-21 ENCOUNTER — PATIENT OUTREACH (OUTPATIENT)
Dept: ADMINISTRATIVE | Facility: HOSPITAL | Age: 59
End: 2024-11-21
Payer: COMMERCIAL

## 2024-11-21 ENCOUNTER — NUTRITION (OUTPATIENT)
Dept: NUTRITION | Facility: CLINIC | Age: 59
End: 2024-11-21
Payer: COMMERCIAL

## 2024-11-21 DIAGNOSIS — Z12.11 ENCOUNTER FOR SCREENING FOR MALIGNANT NEOPLASM OF COLON: Primary | ICD-10-CM

## 2024-11-21 DIAGNOSIS — Z71.3 DIETARY COUNSELING AND SURVEILLANCE: Primary | ICD-10-CM

## 2024-11-21 DIAGNOSIS — I10 HYPERTENSION, UNSPECIFIED TYPE: ICD-10-CM

## 2024-11-21 DIAGNOSIS — E66.01 SEVERE OBESITY (BMI 35.0-39.9) WITH COMORBIDITY: ICD-10-CM

## 2024-11-21 PROCEDURE — 99999 PR PBB SHADOW E&M-EST. PATIENT-LVL I: CPT | Mod: PBBFAC,,, | Performed by: NUTRITIONIST

## 2024-11-21 NOTE — PROGRESS NOTES
"Nutrition Assessment    Visit Type: employee wellness consult  Session Time:  1 Hour  Reason for MNT visit: Pt in for education and nutrition counseling regarding Obesity.       Age: 59 y.o.  Wt:   Wt Readings from Last 1 Encounters:   10/29/24 120 kg (264 lb 8.8 oz)     Ht:   Ht Readings from Last 1 Encounters:   10/29/24 5' 11" (1.803 m)     BMI:   BMI Readings from Last 1 Encounters:   10/29/24 36.90 kg/m²       Client states:  Reports always doing some form of yo-yo diet. He did Intermittent Fasting where his eating window was only 3 hours 1 year ago and lost 30 pounds in 2 months.    Medical History  Problem List             Resolved    Screening for colon cancer         Tooth infection         Left inguinal hernia         KAUSHAL (obstructive sleep apnea)         Chronic left shoulder pain         Decreased range of motion of left shoulder         Hypertensive urgency         Hypertension         Hyperlipidemia         Severe obesity (BMI 35.0-39.9) with comorbidity            Past Medical History:   Diagnosis Date    Allergy     CAD (coronary artery disease)     Eczema     Essential (primary) hypertension     NSTEMI (non-ST elevated myocardial infarction)        Past Surgical History:   Procedure Laterality Date    COLONOSCOPY N/A 2/15/2016    Procedure: COLONOSCOPY;  Surgeon: STONE Sanchez MD;  Location: [facility] ENDO ([address] [place] [address]);  Service: Endoscopy;  Laterality: N/A;  PM Prep    HERNIA REPAIR      LEFT HEART CATHETERIZATION Left 8/17/2023    Procedure: Left heart cath;  Surgeon: Sanchez Burden MD;  Location: [facility] CATH LAB;  Service: Cardiology;  Laterality: Left;          Medications    Prior to Admission medications    Medication Sig Start Date End Date Taking? Authorizing Provider   atorvastatin (LIPITOR) 80 MG tablet Take 0.5 tablets (40 mg total) by mouth once daily. 8/14/24 8/14/25  Hermilo Nina MD   azelastine (ASTELIN) 137 mcg (0.1 %) nasal spray 2 sprays (274 mcg total) by Nasal route 2 (two) " times daily. 10/29/24 10/29/25  Renny Villagomez MD   budesonide-formoterol 160-4.5 mcg (SYMBICORT) 160-4.5 mcg/actuation HFAA Inhale 2 puffs into the lungs every 12 (twelve) hours. Controller 11/18/24   Anthony Wyman MD   famotidine (PEPCID) 40 MG tablet Take 1 tablet (40 mg total) by mouth once daily. 10/29/24 10/29/25  Anthony Wyman MD   fluticasone propionate (FLONASE) 50 mcg/actuation nasal spray 2 sprays (100 mcg total) by Each Nostril route 2 (two) times a day. 10/29/24 10/29/25  Renny Villagomez MD   hydrALAZINE (APRESOLINE) 25 MG tablet Take 1 tablet (25 mg total) by mouth every 12 (twelve) hours as needed (As need for systolic blood pressure >170). 8/18/23 10/29/24  Dk Shaffer MD   hydroCHLOROthiazide (HYDRODIURIL) 25 MG tablet Take 1 tablet (25 mg total) by mouth once daily. 8/30/24 11/28/24  Hermilo Nina MD   levocetirizine (XYZAL) 5 MG tablet Take 1 tablet night x 5 days, then daily PRN 9/3/24   Litzy Mays FNP   losartan (COZAAR) 50 MG tablet Take 1 tablet (50 mg total) by mouth once daily. 8/30/24 8/30/25  Hermilo Nina MD   metoprolol succinate (TOPROL-XL) 50 MG 24 hr tablet Take 1 tablet (50 mg total) by mouth once daily. 8/30/24 2/26/25  Hermilo Nina MD   promethazine-dextromethorphan (PROMETHAZINE-DM) 6.25-15 mg/5 mL Syrp Take 5 mLs by mouth every 4 (four) hours as needed (cough). 11/6/24   Anthony Wyman MD        Vitamins, Minerals, and/or Supplements:  none     Food Allergies or Intolerances:  NKFA     Social History    Marital status:      Social History     Tobacco Use    Smoking status: Never     Passive exposure: Never    Smokeless tobacco: Never   Substance Use Topics    Alcohol use: Yes     Comment: limited     Current Alcohol use: none    Lab Reports:  Reviewed and noted     Lab Results   Component Value Date    TSH 1.910 09/25/2020    AST 28 08/16/2023    ALT 32 08/16/2023    HDL 40 09/19/2023    LDLCALC 65.8 09/19/2023  "   TRIG 56 09/19/2023    HGBA1C 5.4 08/17/2023         BP Readings from Last 1 Encounters:   10/29/24 126/86        24-hour Recall: reviewed and noted during consult       Beverages:  Water: not the best per client  Coffee: 3 large cups daily with 'a lot of vanilla creamer'  Soft drinks: Sprite Zero    LIFESTYLE FACTORS    Dinning out: 1-2 x per week    Meal preparation/shopping: self & wife    Sleep: fair    Support System:  spouse    Exercise Regimen: Moderately active (moderate intensity, 3-5 days a week)      Diagnosis    Excessive energy intake related to eating too many calories at night as evidenced by 24 hour recall.      Intervention    Estimated Energy Requirements:   Calories: 3048-6694 kcal  Protein: 170-200 grams  Carbohydrates: 160 grams  Total Fat: 100 grams; focus on plant-based fats  Baseline for fluids: 130 oz + sweat loss    Recommendations & Goals:  Patient goals and recommendations are tailored to the specific patient's needs, readiness to change, lifestyle, culture, skills, resources, & abilities. Strategies to help achieve these nutrition-related goals were discussed which can include but are not limited to SMART goal setting & mindful eating.     Aim for a minimum of 7 hours sleep   Exercise 60 minutes most days  Eat breakfast within 1-2 hours of waking up  Try not to skip any meals or snacks, not going more than 3-4 hours without eating   At each meal and snack, try to include a source of fiber + lean protein + healthy fat     Written Materials Provided  These resources are intended to assist the patient in making it easier to choose recommended options when eating out & to identify better-for-you brands at the grocery store:    Meal Planning Guide with recommendations discussed along with portion sizes and a customized meal plan   Fueling Well On-the-Go Food Guide  Eat Fit Shopping Guide  Lifestyle Nutrition Meal Guide  RD contact information    Goals:   - "Parker #7 Rule" Aisles of grocery " stores; look for brands that have <7 grams added sugar and 7 grams or more protein  - Salad: Unlimited non-starchy veggies + 7 oz lean protein + 2 fats each being ~3 tablespoons (Honey mustard dressing, cheese, nuts/seeds, avocado)  - Increase weight bearing exercises to at least 2 days per week for 1 hour.  - Deep fried foods: Enjoy at most twice a month  - Increase water intake  - Decrease amount the amount of creamer until you're able to use 2-3 tablespoons.   -0-calorie plant-based sweetener: Truvia, Monk Fruit, Allulose, etc. You can use any amount of this. Instead do a splash of half-n-half and any amount of Monk Fruit  -Zevia: 0-calorie plant-based soft drinks  -Eat every 3-4 hours  -You need to eat more during the day  -Always have a lean protein/healthy fat with a carbohydrate source  -Compromise: if having 1 snack size bag of chips, have a greek yogurt with monk fruit with it -or- 3/4 cup cottage cheese        Monitoring/Evaluation    Monitor the following:  Weight  Sleep  Stress Management  Movement  Nutrient intake in reference to meal plan    Communicated with healthcare provider and documented plan for referral to appropriate agency/healthcare provider as needed    Supervising Physician: Neo Mcleod MD    Patient motivation, anticipated barriers, expected compliance: Patient is motivated and has verbalized understanding and intent to comply.     Comprehension: good     Follow-up: January 7th, 2025

## 2024-11-21 NOTE — PATIENT INSTRUCTIONS
"    Name: Naldo Barakat   Date: 11/21/2024    Recommended daily energy requirements to reach your goals:  Calories: 3730-9337 kcal  Protein: 170-200 grams  Carbohydrates: 160 grams  Total Fat: 100 grams; focus on plant-based fats  Baseline for fluids: 130 oz + sweat loss    Client Goals:  - "Parker #7 Rule" Aisles of grocery stores; look for brands that have <7 grams added sugar and 7 grams or more protein  - Salad: Unlimited non-starchy veggies + 7 oz lean protein + 2 fats each being ~3 tablespoons (Honey mustard dressing, cheese, nuts/seeds, avocado)  - Increase weight bearing exercises to at least 2 days per week for 1 hour.  - Deep fried foods: Enjoy at most twice a month  - Increase water intake  - Decrease amount the amount of creamer until you're able to use 2-3 tablespoons.   -0-calorie plant-based sweetener: Truvia, Monk Fruit, Allulose, etc. You can use any amount of this. Instead do a splash of half-n-half and any amount of Monk Fruit  -Zevia: 0-calorie plant-based soft drinks  -Eat every 3-4 hours  -You need to eat more during the day  -Always have a lean protein/healthy fat with a carbohydrate source  -Compromise: if having 1 snack size bag of chips, have a greek yogurt with monk fruit with it -or- 3/4 cup cottage cheese                                                                                  "

## 2024-11-21 NOTE — PROGRESS NOTES
Health Maintenance Due   Topic Date Due    HIV Screening  Never done    Aspirin/Antiplatelet Therapy  Never done    Shingles Vaccine (1 of 2) Never done    Colorectal Cancer Screening  02/22/2021    RSV Vaccine (Age 60+ and Pregnant patients) (1 - Risk 60-74 years 1-dose series) Never done    Influenza Vaccine (1) 09/01/2024    COVID-19 Vaccine (6 - 2024-25 season) 09/01/2024       Chart reviewed and updated. Reconciled immunizations. Placed referral for colonoscopy  Penny Clement LPN   Clinical Care Coordinator  Primary Care and Wellness      
Yes

## 2024-12-02 ENCOUNTER — CLINICAL SUPPORT (OUTPATIENT)
Dept: ENDOSCOPY | Facility: HOSPITAL | Age: 59
End: 2024-12-02
Attending: INTERNAL MEDICINE
Payer: COMMERCIAL

## 2024-12-02 VITALS — BODY MASS INDEX: 36.96 KG/M2 | WEIGHT: 264 LBS | HEIGHT: 71 IN

## 2024-12-02 DIAGNOSIS — Z12.11 ENCOUNTER FOR SCREENING FOR MALIGNANT NEOPLASM OF COLON: ICD-10-CM

## 2024-12-03 RX ORDER — SODIUM, POTASSIUM,MAG SULFATES 17.5-3.13G
1 SOLUTION, RECONSTITUTED, ORAL ORAL DAILY
Qty: 1 KIT | Refills: 0 | Status: SHIPPED | OUTPATIENT
Start: 2024-12-03 | End: 2024-12-07

## 2024-12-05 ENCOUNTER — TELEPHONE (OUTPATIENT)
Dept: ENDOSCOPY | Facility: HOSPITAL | Age: 59
End: 2024-12-05
Payer: COMMERCIAL

## 2024-12-05 NOTE — TELEPHONE ENCOUNTER
Referral for procedure from PAT appointment      Spoke to pt to schedule procedure(s) Colonoscopy       Physician to perform procedure(s) Dr. DEON Diaz  Date of Procedure (s) 1/30/25  Arrival Time 10:00 AM  Time of Procedure(s) 11:00 AM   Location of Procedure(s) [place] 4th Floor  Type of Rx Prep sent to patient: Suprep  Instructions provided to patient via [facility]    Patient was informed on the following information and verbalized understanding. Screening questionnaire reviewed with patient and complete. If procedure requires anesthesia, a responsible adult needs to be present to accompany the patient home, patient cannot drive after receiving anesthesia. Appointment details are tentative, especially check-in time. Patient will receive a prep-op call 7 days prior to confirm check-in time for procedure. If applicable the patient should contact their pharmacy to verify Rx for procedure prep is ready for pick-up. Patient was advised to call the scheduling department at [phone] if pharmacy states no Rx is available. Patient was advised to call the endoscopy scheduling department if any questions or concerns arise.      [facility] Scheduling Department

## 2024-12-12 ENCOUNTER — TELEPHONE (OUTPATIENT)
Dept: ENDOSCOPY | Facility: HOSPITAL | Age: 59
End: 2024-12-12
Payer: COMMERCIAL

## 2024-12-12 NOTE — TELEPHONE ENCOUNTER
----- Message from Argenis sent at 2024  7:46 AM CST -----  Regardin/30 CL  The patient is currently under an internal cardiologist, kenia Fraga. Is patient medically optimized by Cardiology for their upcoming scheduled Colonoscopy on 25.

## 2024-12-26 ENCOUNTER — HOSPITAL ENCOUNTER (INPATIENT)
Facility: HOSPITAL | Age: 59
LOS: 1 days | Discharge: HOME | DRG: 066 | End: 2024-12-27
Attending: STUDENT IN AN ORGANIZED HEALTH CARE EDUCATION/TRAINING PROGRAM | Admitting: PSYCHIATRY & NEUROLOGY
Payer: COMMERCIAL

## 2024-12-26 DIAGNOSIS — I63.9 STROKE: ICD-10-CM

## 2024-12-26 DIAGNOSIS — I10 HYPERTENSION, UNSPECIFIED TYPE: ICD-10-CM

## 2024-12-26 DIAGNOSIS — I63.532 ACUTE ISCHEMIC LEFT PCA STROKE: Primary | ICD-10-CM

## 2024-12-26 DIAGNOSIS — G47.33 OSA (OBSTRUCTIVE SLEEP APNEA): ICD-10-CM

## 2024-12-26 DIAGNOSIS — R29.818 ACUTE FOCAL NEUROLOGICAL DEFICIT: ICD-10-CM

## 2024-12-26 PROBLEM — H53.461 HOMONYMOUS BILATERAL FIELD DEFECTS, RIGHT SIDE: Status: ACTIVE | Noted: 2024-12-26

## 2024-12-26 LAB
ALBUMIN SERPL BCP-MCNC: 4.2 G/DL (ref 3.5–5.2)
ALP SERPL-CCNC: 101 U/L (ref 40–150)
ALT SERPL W/O P-5'-P-CCNC: 45 U/L (ref 10–44)
ANION GAP SERPL CALC-SCNC: 10 MMOL/L (ref 8–16)
AST SERPL-CCNC: 26 U/L (ref 10–40)
BASOPHILS # BLD AUTO: 0.04 K/UL (ref 0–0.2)
BASOPHILS NFR BLD: 0.5 % (ref 0–1.9)
BILIRUB SERPL-MCNC: 0.7 MG/DL (ref 0.1–1)
BUN SERPL-MCNC: 15 MG/DL (ref 6–30)
BUN SERPL-MCNC: 16 MG/DL (ref 8–23)
CALCIUM SERPL-MCNC: 9.7 MG/DL (ref 8.7–10.5)
CHLORIDE SERPL-SCNC: 103 MMOL/L (ref 95–110)
CHLORIDE SERPL-SCNC: 104 MMOL/L (ref 95–110)
CHOLEST SERPL-MCNC: 150 MG/DL (ref 120–199)
CHOLEST/HDLC SERPL: 3.2 {RATIO} (ref 2–5)
CO2 SERPL-SCNC: 24 MMOL/L (ref 23–29)
CREAT SERPL-MCNC: 1.3 MG/DL (ref 0.5–1.4)
CREAT SERPL-MCNC: 1.3 MG/DL (ref 0.5–1.4)
CREAT SERPL-MCNC: 1.4 MG/DL (ref 0.5–1.4)
DIFFERENTIAL METHOD BLD: ABNORMAL
EOSINOPHIL # BLD AUTO: 0 K/UL (ref 0–0.5)
EOSINOPHIL NFR BLD: 0.4 % (ref 0–8)
ERYTHROCYTE [DISTWIDTH] IN BLOOD BY AUTOMATED COUNT: 12.8 % (ref 11.5–14.5)
EST. GFR  (NO RACE VARIABLE): >60 ML/MIN/1.73 M^2
ESTIMATED AVG GLUCOSE: 131 MG/DL (ref 68–131)
GLUCOSE SERPL-MCNC: 151 MG/DL (ref 70–110)
GLUCOSE SERPL-MCNC: 154 MG/DL (ref 70–110)
GLUCOSE SERPL-MCNC: 154 MG/DL (ref 70–110)
HBA1C MFR BLD: 6.2 % (ref 4–5.6)
HCT VFR BLD AUTO: 43.4 % (ref 40–54)
HCT VFR BLD CALC: 45 %PCV (ref 36–54)
HDLC SERPL-MCNC: 47 MG/DL (ref 40–75)
HDLC SERPL: 31.3 % (ref 20–50)
HGB BLD-MCNC: 15.2 G/DL (ref 14–18)
IMM GRANULOCYTES # BLD AUTO: 0.02 K/UL (ref 0–0.04)
IMM GRANULOCYTES NFR BLD AUTO: 0.3 % (ref 0–0.5)
INR PPP: 0.9 (ref 0.8–1.2)
LDLC SERPL CALC-MCNC: 83.2 MG/DL (ref 63–159)
LYMPHOCYTES # BLD AUTO: 1.5 K/UL (ref 1–4.8)
LYMPHOCYTES NFR BLD: 19.1 % (ref 18–48)
MCH RBC QN AUTO: 34.2 PG (ref 27–31)
MCHC RBC AUTO-ENTMCNC: 35 G/DL (ref 32–36)
MCV RBC AUTO: 98 FL (ref 82–98)
MONOCYTES # BLD AUTO: 0.4 K/UL (ref 0.3–1)
MONOCYTES NFR BLD: 5.4 % (ref 4–15)
NEUTROPHILS # BLD AUTO: 5.9 K/UL (ref 1.8–7.7)
NEUTROPHILS NFR BLD: 74.3 % (ref 38–73)
NONHDLC SERPL-MCNC: 103 MG/DL
NRBC BLD-RTO: 0 /100 WBC
OHS QRS DURATION: 84 MS
OHS QTC CALCULATION: 427 MS
PLATELET # BLD AUTO: 260 K/UL (ref 150–450)
PMV BLD AUTO: 9.3 FL (ref 9.2–12.9)
POC IONIZED CALCIUM: 1.22 MMOL/L (ref 1.06–1.42)
POC PTINR: 1 (ref 0.9–1.2)
POC PTWBT: 10.2 SEC (ref 9.7–14.3)
POC TCO2 (MEASURED): 25 MMOL/L (ref 23–29)
POCT GLUCOSE: 154 MG/DL (ref 70–110)
POTASSIUM BLD-SCNC: 3.7 MMOL/L (ref 3.5–5.1)
POTASSIUM SERPL-SCNC: 3.6 MMOL/L (ref 3.5–5.1)
PROT SERPL-MCNC: 7.6 G/DL (ref 6–8.4)
PROTHROMBIN TIME: 10.1 SEC (ref 9–12.5)
RBC # BLD AUTO: 4.45 M/UL (ref 4.6–6.2)
SAMPLE: ABNORMAL
SAMPLE: NORMAL
SAMPLE: NORMAL
SODIUM BLD-SCNC: 139 MMOL/L (ref 136–145)
SODIUM SERPL-SCNC: 138 MMOL/L (ref 136–145)
TRIGL SERPL-MCNC: 99 MG/DL (ref 30–150)
TSH SERPL DL<=0.005 MIU/L-ACNC: 1.71 UIU/ML (ref 0.4–4)
WBC # BLD AUTO: 7.94 K/UL (ref 3.9–12.7)

## 2024-12-26 PROCEDURE — 80047 BASIC METABLC PNL IONIZED CA: CPT

## 2024-12-26 PROCEDURE — 63600175 PHARM REV CODE 636 W HCPCS

## 2024-12-26 PROCEDURE — 83036 HEMOGLOBIN GLYCOSYLATED A1C: CPT | Performed by: STUDENT IN AN ORGANIZED HEALTH CARE EDUCATION/TRAINING PROGRAM

## 2024-12-26 PROCEDURE — 99900035 HC TECH TIME PER 15 MIN (STAT)

## 2024-12-26 PROCEDURE — 93010 ELECTROCARDIOGRAM REPORT: CPT | Mod: ,,, | Performed by: INTERNAL MEDICINE

## 2024-12-26 PROCEDURE — 25000003 PHARM REV CODE 250

## 2024-12-26 PROCEDURE — 82565 ASSAY OF CREATININE: CPT

## 2024-12-26 PROCEDURE — 11000001 HC ACUTE MED/SURG PRIVATE ROOM

## 2024-12-26 PROCEDURE — 85610 PROTHROMBIN TIME: CPT

## 2024-12-26 PROCEDURE — 99223 1ST HOSP IP/OBS HIGH 75: CPT | Mod: ,,, | Performed by: PSYCHIATRY & NEUROLOGY

## 2024-12-26 PROCEDURE — 85025 COMPLETE CBC W/AUTO DIFF WBC: CPT | Performed by: STUDENT IN AN ORGANIZED HEALTH CARE EDUCATION/TRAINING PROGRAM

## 2024-12-26 PROCEDURE — 25500020 PHARM REV CODE 255: Performed by: STUDENT IN AN ORGANIZED HEALTH CARE EDUCATION/TRAINING PROGRAM

## 2024-12-26 PROCEDURE — 82962 GLUCOSE BLOOD TEST: CPT

## 2024-12-26 PROCEDURE — 85610 PROTHROMBIN TIME: CPT | Performed by: STUDENT IN AN ORGANIZED HEALTH CARE EDUCATION/TRAINING PROGRAM

## 2024-12-26 PROCEDURE — 80061 LIPID PANEL: CPT | Performed by: STUDENT IN AN ORGANIZED HEALTH CARE EDUCATION/TRAINING PROGRAM

## 2024-12-26 PROCEDURE — 84443 ASSAY THYROID STIM HORMONE: CPT | Performed by: STUDENT IN AN ORGANIZED HEALTH CARE EDUCATION/TRAINING PROGRAM

## 2024-12-26 PROCEDURE — 80053 COMPREHEN METABOLIC PANEL: CPT | Performed by: STUDENT IN AN ORGANIZED HEALTH CARE EDUCATION/TRAINING PROGRAM

## 2024-12-26 PROCEDURE — 93005 ELECTROCARDIOGRAM TRACING: CPT

## 2024-12-26 PROCEDURE — 99285 EMERGENCY DEPT VISIT HI MDM: CPT | Mod: 25

## 2024-12-26 RX ORDER — TALC
6 POWDER (GRAM) TOPICAL NIGHTLY PRN
Status: DISCONTINUED | OUTPATIENT
Start: 2024-12-26 | End: 2024-12-27 | Stop reason: HOSPADM

## 2024-12-26 RX ORDER — HEPARIN SODIUM 5000 [USP'U]/ML
5000 INJECTION, SOLUTION INTRAVENOUS; SUBCUTANEOUS EVERY 8 HOURS
Status: DISCONTINUED | OUTPATIENT
Start: 2024-12-26 | End: 2024-12-27 | Stop reason: HOSPADM

## 2024-12-26 RX ORDER — ATORVASTATIN CALCIUM 40 MG/1
40 TABLET, FILM COATED ORAL DAILY
Status: DISCONTINUED | OUTPATIENT
Start: 2024-12-26 | End: 2024-12-27

## 2024-12-26 RX ORDER — ACETAMINOPHEN 325 MG/1
650 TABLET ORAL EVERY 6 HOURS PRN
Status: DISCONTINUED | OUTPATIENT
Start: 2024-12-26 | End: 2024-12-27

## 2024-12-26 RX ORDER — ASPIRIN 81 MG/1
81 TABLET ORAL DAILY
Status: DISCONTINUED | OUTPATIENT
Start: 2024-12-27 | End: 2024-12-27 | Stop reason: HOSPADM

## 2024-12-26 RX ORDER — LABETALOL HCL 20 MG/4 ML
10 SYRINGE (ML) INTRAVENOUS
Status: DISCONTINUED | OUTPATIENT
Start: 2024-12-26 | End: 2024-12-27 | Stop reason: HOSPADM

## 2024-12-26 RX ORDER — POLYETHYLENE GLYCOL 3350 17 G/17G
17 POWDER, FOR SOLUTION ORAL DAILY
Status: DISCONTINUED | OUTPATIENT
Start: 2024-12-26 | End: 2024-12-27 | Stop reason: HOSPADM

## 2024-12-26 RX ORDER — CLOPIDOGREL BISULFATE 75 MG/1
300 TABLET ORAL ONCE
Status: COMPLETED | OUTPATIENT
Start: 2024-12-26 | End: 2024-12-26

## 2024-12-26 RX ORDER — BISACODYL 10 MG/1
10 SUPPOSITORY RECTAL DAILY PRN
Status: DISCONTINUED | OUTPATIENT
Start: 2024-12-26 | End: 2024-12-27 | Stop reason: HOSPADM

## 2024-12-26 RX ORDER — CLOPIDOGREL BISULFATE 75 MG/1
75 TABLET ORAL DAILY
Status: DISCONTINUED | OUTPATIENT
Start: 2024-12-27 | End: 2024-12-27 | Stop reason: HOSPADM

## 2024-12-26 RX ORDER — SODIUM CHLORIDE 0.9 % (FLUSH) 0.9 %
10 SYRINGE (ML) INJECTION
Status: DISCONTINUED | OUTPATIENT
Start: 2024-12-26 | End: 2024-12-27 | Stop reason: HOSPADM

## 2024-12-26 RX ORDER — ONDANSETRON 8 MG/1
8 TABLET, ORALLY DISINTEGRATING ORAL EVERY 8 HOURS PRN
Status: DISCONTINUED | OUTPATIENT
Start: 2024-12-26 | End: 2024-12-27 | Stop reason: HOSPADM

## 2024-12-26 RX ADMIN — ACETAMINOPHEN 650 MG: 325 TABLET ORAL at 09:12

## 2024-12-26 RX ADMIN — IOHEXOL 75 ML: 350 INJECTION, SOLUTION INTRAVENOUS at 11:12

## 2024-12-26 RX ADMIN — ATORVASTATIN CALCIUM 40 MG: 40 TABLET, FILM COATED ORAL at 04:12

## 2024-12-26 RX ADMIN — HEPARIN SODIUM 5000 UNITS: 5000 INJECTION INTRAVENOUS; SUBCUTANEOUS at 04:12

## 2024-12-26 RX ADMIN — CLOPIDOGREL BISULFATE 300 MG: 75 TABLET ORAL at 04:12

## 2024-12-26 RX ADMIN — ACETAMINOPHEN 650 MG: 325 TABLET ORAL at 04:12

## 2024-12-26 RX ADMIN — HEPARIN SODIUM 5000 UNITS: 5000 INJECTION INTRAVENOUS; SUBCUTANEOUS at 09:12

## 2024-12-26 NOTE — CONSULTS
[facility] - Neurosurgery ([facility])  Vascular Neurology  Comprehensive Stroke Center  Consult Note    Inpatient consult to Vascular (Stroke) Neurology  Consult performed by: Elisha Aguirre MD  Consult ordered by: Alverto Benavides MD        Assessment/Plan:     Patient is a 59 y.o. year old male with:    * Acute ischemic left PCA stroke  Naldo Barakat is a 59 y.o. male w/ HTN, HLD, CAD, ocular migraines, hx of NSTEMI, KAUSHAL not on CPAP presents today for a headache and blurry vision. Presents today with a head, confusion, and blurry vision after symptoms started yesterday at 3 pm. Said he got blurry vision at that time and a headache. Since then went into work this morning, confusion doing events and tasks that he normally does. Wife has not noticed any deficits and a single side, weakness, or changes in speech patterns and him. Endorses taking all of his daily medications. Allergic to erythromycin and macrolide antibiotics.     CTA stroke MP with possible L. PCA distal occlusion and left posterior parietoccipital hypodensity. Patient to be admitted to [facility] for stroke workup. Started on DAPT. At home he loaded himself with ASA (325+81mg). Will load plavix and start statin.  TTE w/ bubble and A1C pending.      Antithrombotics for secondary stroke prevention: Antiplatelets: Aspirin: 81 mg daily  Clopidogrel: 300 mg loading dose x 1, now  Clopidogrel: 75 mg daily    Statins for secondary stroke prevention and hyperlipidemia, if present:   Statins: Atorvastatin- 40 mg daily    Aggressive risk factor modification: HTN, HLD, Diet, Exercise, Obesity, CAD     Rehab efforts: The patient has been evaluated by a stroke team provider and the therapy needs have been fully considered based off the presenting complaints and exam findings. The following therapy evaluations are needed: OT evaluate and treat, SLP evaluate and treat, PM&R evaluate for appropriate placement    Diagnostics ordered/pending: HgbA1C to assess blood  glucose levels, Lipid Profile to assess cholesterol levels, MRI head without contrast to assess brain parenchyma, TTE to assess cardiac function/status , TSH to assess thyroid function    VTE prophylaxis: Heparin 5000 units SQ every 8 hours  Mechanical prophylaxis: Place SCDs    BP parameters: Infarct: No intervention, SBP <220        Severe obesity (BMI 35.0-39.9) with comorbidity  Stroke RF  Counseled patient on weight loss and stroke risk reduction counseling patient on diet, exercise, and weight loss.  PT/OT eval    KAUSHAL (obstructive sleep apnea)  Stroke RF  Not using CPAP currently        STROKE DOCUMENTATION     Acute Stroke Times   Last Known Normal Date: 12/25/24  Last Known Normal Time: 1500  Symptom Onset Date: 12/25/24  Symptom Onset Time: 1500  Stroke Team Called Date: 12/26/24  Stroke Team Called Time: 1107  Stroke Team Arrival Date: 12/26/24  Stroke Team Arrival Time: 1108  CT Interpretation Time: 1126  Thrombolytic Therapy Recommended: No  CTA Interpretation Time: 1126  Thrombectomy Recommended: No    NIH Scale:  Interval: baseline  1a. Level of Consciousness: 0-->Alert, keenly responsive  1b. LOC Questions: 0-->Answers both questions correctly  1c. LOC Commands: 0-->Performs both tasks correctly  2. Best Gaze: 0-->Normal  3. Visual: 1-->Partial hemianopia  4. Facial Palsy: 0-->Normal symmetrical movements  5a. Motor Arm, Left: 0-->No drift, limb holds 90 (or 45) degrees for full 10 secs  5b. Motor Arm, Right: 0-->No drift, limb holds 90 (or 45) degrees for full 10 secs  6a. Motor Leg, Left: 0-->No drift, leg holds 30 degree position for full 5 secs  6b. Motor Leg, Right: 0-->No drift, leg holds 30 degree position for full 5 secs  7. Limb Ataxia: 0-->Absent  8. Sensory: 0-->Normal, no sensory loss  9. Best Language: 0-->No aphasia, normal  10. Dysarthria: 0-->Normal  11. Extinction and Inattention (formerly Neglect): 0-->No abnormality  Total (NIH Stroke Scale): 1    Modified Gates Score: 0  Brandi  Coma Scale:    ABCD2 Score:    [id number] Score:   HAS -BLED Score:   ICH Score:   Hunt & Singletary Classification:       Thrombolysis Candidate? No, Out of window - Symptom onset > 4.5 hours    Delays to Thrombolysis?  Not Applicable    Interventional Revascularization Candidate?   Is the patient eligible for mechanical endovascular reperfusion (ROWENA)?  No; Large core infarct on imaging  and No; no significant neurologic deficit (NIHSS <6)     Delays to Thrombectomy? Not Applicable    Hemorrhagic change of an Ischemic Stroke: Does this patient have an ischemic stroke with hemorrhagic changes? No     Subjective:     History of Present Illness:  Naldo Barakat is a 59 y.o. male w/ HTN, HLD, CAD, ocular migraines, hx of NSTEMI, KAUSHAL not on CPAP presents today for a headache and blurry vision. Presents today with a head, confusion, and blurry vision after symptoms started yesterday at 3 pm. Said he got blurry vision at that time and a headache. Since then went into work this morning, confusion doing events and tasks that he normally does. Wife has not noticed any deficits and a single side, weakness, or changes in speech patterns and him. Endorses taking all of his daily medications. Allergic to erythromycin and macrolide antibiotics.           Past Medical History:   Diagnosis Date    Allergy     CAD (coronary artery disease)     Eczema     Essential (primary) hypertension     NSTEMI (non-ST elevated myocardial infarction)      Past Surgical History:   Procedure Laterality Date    COLONOSCOPY N/A 2/15/2016    Procedure: COLONOSCOPY;  Surgeon: STONE Sanchez MD;  Location: [facility] ([address] [place] [address]);  Service: Endoscopy;  Laterality: N/A;  PM Prep    HERNIA REPAIR      LEFT HEART CATHETERIZATION Left 8/17/2023    Procedure: Left heart cath;  Surgeon: Sanchez Burden MD;  Location: [facility] CATH LAB;  Service: Cardiology;  Laterality: Left;     Social History     Tobacco Use    Smoking status: Never     Passive exposure: Never     Smokeless tobacco: Never   Substance Use Topics    Alcohol use: Yes     Comment: limited     Review of patient's allergies indicates:   Allergen Reactions    Erythromycin     Macrolide antibiotics        Medications: I have reviewed the current medication administration record.    (Not in a hospital admission)      Review of Systems   Eyes:  Positive for visual disturbance.   Psychiatric/Behavioral:  Positive for confusion.      Objective:     Vital Signs (Most Recent):  Temp: 98.6 °F (37 °C) (12/26/24 1106)  Pulse: 74 (12/26/24 1215)  Resp: 16 (12/26/24 1215)  BP: (!) 162/89 (12/26/24 1215)  SpO2: 96 % (12/26/24 1215)    Vital Signs Range (Last 24H):  Temp:  [98.6 °F (37 °C)]   Pulse:  [66-74]   Resp:  [16-20]   BP: (152-162)/()   SpO2:  [96 %-99 %]        Physical Exam  Vitals and nursing note reviewed.   Constitutional:       General: He is not in acute distress.     Appearance: Normal appearance. He is not ill-appearing or diaphoretic.   HENT:      Head: Normocephalic and atraumatic.      Right Ear: External ear normal.      Left Ear: External ear normal.      Nose: Nose normal. No congestion or rhinorrhea.      Mouth/Throat:      Mouth: Mucous membranes are moist.      Pharynx: Oropharynx is clear.   Eyes:      General: No scleral icterus.     Extraocular Movements: Extraocular movements intact.      Pupils: Pupils are equal, round, and reactive to light.   Pulmonary:      Effort: Pulmonary effort is normal.   Abdominal:      Palpations: Abdomen is soft.   Musculoskeletal:         General: Normal range of motion.      Cervical back: Normal range of motion and neck supple.      Right lower leg: No edema.      Left lower leg: No edema.   Skin:     General: Skin is warm and dry.   Neurological:      Mental Status: He is alert and oriented to person, place, and time.      Cranial Nerves: Cranial nerve deficit present.   Psychiatric:         Mood and Affect: Mood normal.         Behavior: Behavior normal.          Thought Content: Thought content normal.         Judgment: Judgment normal.              Neurological Exam:   LOC: alert  Attention Span: Good   Language: No aphasia  Orientation: Person, Place, Time  and 30s delay in reciting year  Visual Fields: Hemianopsia right  EOM (CN III, IV, VI): Full/intact  Pupils (CN II, III): Anisocoria Side: R pupil 5 mm Reactive: Yes Brisk  L pupil 3 mm Reactive: Yes Brisk  Facial Sensation (CN V): Normal  Facial Movement (CN VII): Symmetric facial expression    Motor: 5/5 throughout  Sensation: Intact to light touch, temperature and vibration      Laboratory:  CMP:   Recent Labs   Lab 12/26/24  1122   CALCIUM 9.7   ALBUMIN 4.2   PROT 7.6      K 3.6   CO2 24      BUN 16   CREATININE 1.3   ALKPHOS 101   ALT 45*   AST 26   BILITOT 0.7     CBC:   Recent Labs   Lab 12/26/24  1122   WBC 7.94   RBC 4.45*   HGB 15.2   HCT 43.4  45      MCV 98   MCH 34.2*   MCHC 35.0     Lipid Panel:   Recent Labs   Lab 12/26/24  1122   CHOL 150   LDLCALC 83.2   HDL 47   TRIG 99     Coagulation:   Recent Labs   Lab 12/26/24  1122   INR 0.9     TSH:   Recent Labs   Lab 12/26/24  1122   TSH 1.714       Diagnostic Results:      Brain/Vessel Imaging:  CTA H/N 12/26/2024   Large acute left PCA distribution infarct.     Possible occlusion of the distal left PCA.  Otherwise unremarkable CT arteriogram of the head and neck without significant atherosclerotic change.       Cardiac Evaluation:   TTE 8/17/23    Technically challenging study with poor endocardial visualization.    Left Ventricle: The left ventricle is normal in size. Normal wall thickness. Regional wall motion abnormalities present, see diagram for wall motion findings. There is normal systolic function with a visually estimated ejection fraction of 60 - 65%. There is normal diastolic function.    Right Ventricle: Normal right ventricular cavity size. Wall thickness is normal. Right ventricle wall motion  is normal. Systolic  function is normal.    Aorta: Aortic root is mildly dilated measuring 4.2 cm. Ascending aorta is mildly dilated measuring 3.9 cm.    IVC/SVC: Normal venous pressure at 3 mmHg.      Elisha Aguirre MD  [facility]  Department of Vascular Neurology   [facility] [place])

## 2024-12-26 NOTE — ED NOTES
I-STAT Chem-8+ Results:   Value Reference Range   Sodium 139 136-145 mmol/L   Potassium  3.7 3.5-5.1 mmol/L   Chloride 103  mmol/L   Ionized Calcium 1.22 1.06-1.42 mmol/L   CO2 (measured) 25 23-29 mmol/L   Glucose 151  mg/dL   BUN 15 6-30 mg/dL   Creatinine 1.3 0.5-1.4 mg/dL   Hematocrit 45 36-54%

## 2024-12-26 NOTE — ASSESSMENT & PLAN NOTE
Naldo Barakat is a 59 y.o. male w/ HTN, HLD, CAD, ocular migraines, hx of NSTEMI, KAUSHAL not on CPAP presents today for a headache and blurry vision. Presents today with a head, confusion, and blurry vision after symptoms started yesterday at 3 pm. Said he got blurry vision at that time and a headache. Since then went into work this morning, confusion doing events and tasks that he normally does. Wife has not noticed any deficits and a single side, weakness, or changes in speech patterns and him. Endorses taking all of his daily medications. Allergic to erythromycin and macrolide antibiotics.   NIHSS 1 for visual field cut.     CTA stroke MP with possible L. PCA distal occlusion and left posterior parietoccipital hypodensity. Patient to be admitted to [facility] for stroke workup. Started on DAPT. At home he loaded himself with ASA (325+81mg). Will load plavix and start statin.  TTE w/ bubble and A1C pending.      Antithrombotics for secondary stroke prevention: Antiplatelets: Aspirin: 81 mg daily  Clopidogrel: 300 mg loading dose x 1, now  Clopidogrel: 75 mg daily    Statins for secondary stroke prevention and hyperlipidemia, if present:   Statins: Atorvastatin- 40 mg daily    Aggressive risk factor modification: HTN, HLD, Diet, Exercise, Obesity, CAD     Rehab efforts: The patient has been evaluated by a stroke team provider and the therapy needs have been fully considered based off the presenting complaints and exam findings. The following therapy evaluations are needed: OT evaluate and treat, SLP evaluate and treat, PM&R evaluate for appropriate placement    Diagnostics ordered/pending: HgbA1C to assess blood glucose levels, Lipid Profile to assess cholesterol levels, MRI head without contrast to assess brain parenchyma, TTE to assess cardiac function/status , TSH to assess thyroid function    VTE prophylaxis: Heparin 5000 units SQ every 8 hours  Mechanical prophylaxis: Place SCDs    BP parameters: Infarct: No  intervention, SBP <220

## 2024-12-26 NOTE — HPI
Naldo Barakat is a 59 y.o. male w/ HTN, HLD, CAD, ocular migraines, hx of NSTEMI, KAUSHAL not on CPAP presents today for a headache and blurry vision. Presents today with a head, confusion, and blurry vision after symptoms started yesterday at 3 pm. Said he got blurry vision at that time and a headache. Since then went into work this morning, confusion doing events and tasks that he normally does. Wife has not noticed any deficits and a single side, weakness, or changes in speech patterns and him. Endorses taking all of his daily medications. Allergic to erythromycin and macrolide antibiotics.

## 2024-12-26 NOTE — SUBJECTIVE & OBJECTIVE
Past Medical History:   Diagnosis Date    Allergy     CAD (coronary artery disease)     Eczema     Essential (primary) hypertension     NSTEMI (non-ST elevated myocardial infarction)      Past Surgical History:   Procedure Laterality Date    COLONOSCOPY N/A 2/15/2016    Procedure: COLONOSCOPY;  Surgeon: STONE Sanchez MD;  Location: [facility] ENDO ([address] [place] [address]);  Service: Endoscopy;  Laterality: N/A;  PM Prep    HERNIA REPAIR      LEFT HEART CATHETERIZATION Left 8/17/2023    Procedure: Left heart cath;  Surgeon: Sanchez Burden MD;  Location: [facility] CATH LAB;  Service: Cardiology;  Laterality: Left;     Social History     Tobacco Use    Smoking status: Never     Passive exposure: Never    Smokeless tobacco: Never   Substance Use Topics    Alcohol use: Yes     Comment: limited     Review of patient's allergies indicates:   Allergen Reactions    Erythromycin     Macrolide antibiotics        Medications: I have reviewed the current medication administration record.    (Not in a hospital admission)      Review of Systems   Eyes:  Positive for visual disturbance.   Psychiatric/Behavioral:  Positive for confusion.      Objective:     Vital Signs (Most Recent):  Temp: 98.6 °F (37 °C) (12/26/24 1106)  Pulse: 74 (12/26/24 1215)  Resp: 16 (12/26/24 1215)  BP: (!) 162/89 (12/26/24 1215)  SpO2: 96 % (12/26/24 1215)    Vital Signs Range (Last 24H):  Temp:  [98.6 °F (37 °C)]   Pulse:  [66-74]   Resp:  [16-20]   BP: (152-162)/()   SpO2:  [96 %-99 %]        Physical Exam  Vitals and nursing note reviewed.   Constitutional:       General: He is not in acute distress.     Appearance: Normal appearance. He is not ill-appearing or diaphoretic.   HENT:      Head: Normocephalic and atraumatic.      Right Ear: External ear normal.      Left Ear: External ear normal.      Nose: Nose normal. No congestion or rhinorrhea.      Mouth/Throat:      Mouth: Mucous membranes are moist.      Pharynx: Oropharynx is clear.   Eyes:       General: No scleral icterus.     Extraocular Movements: Extraocular movements intact.      Pupils: Pupils are equal, round, and reactive to light.   Pulmonary:      Effort: Pulmonary effort is normal.   Abdominal:      Palpations: Abdomen is soft.   Musculoskeletal:         General: Normal range of motion.      Cervical back: Normal range of motion and neck supple.      Right lower leg: No edema.      Left lower leg: No edema.   Skin:     General: Skin is warm and dry.   Neurological:      Mental Status: He is alert and oriented to person, place, and time.      Cranial Nerves: Cranial nerve deficit present.   Psychiatric:         Mood and Affect: Mood normal.         Behavior: Behavior normal.         Thought Content: Thought content normal.         Judgment: Judgment normal.              Neurological Exam:   LOC: alert  Attention Span: Good   Language: No aphasia  Orientation: Person, Place, Time  and 30s delay in reciting year  Visual Fields: Hemianopsia right  EOM (CN III, IV, VI): Full/intact  Pupils (CN II, III): Anisocoria Side: R pupil 5 mm Reactive: Yes Brisk  L pupil 3 mm Reactive: Yes Brisk  Facial Sensation (CN V): Normal  Facial Movement (CN VII): Symmetric facial expression    Motor: 5/5 throughout  Sensation: Intact to light touch, temperature and vibration      Laboratory:  CMP:   Recent Labs   Lab 12/26/24  1122   CALCIUM 9.7   ALBUMIN 4.2   PROT 7.6      K 3.6   CO2 24      BUN 16   CREATININE 1.3   ALKPHOS 101   ALT 45*   AST 26   BILITOT 0.7     CBC:   Recent Labs   Lab 12/26/24  1122   WBC 7.94   RBC 4.45*   HGB 15.2   HCT 43.4  45      MCV 98   MCH 34.2*   MCHC 35.0     Lipid Panel:   Recent Labs   Lab 12/26/24  1122   CHOL 150   LDLCALC 83.2   HDL 47   TRIG 99     Coagulation:   Recent Labs   Lab 12/26/24  1122   INR 0.9     TSH:   Recent Labs   Lab 12/26/24  1122   TSH 1.714       Diagnostic Results:      Brain/Vessel Imaging:  CTA H/N 12/26/2024   Large acute left PCA  distribution infarct.     Possible occlusion of the distal left PCA.  Otherwise unremarkable CT arteriogram of the head and neck without significant atherosclerotic change.       Cardiac Evaluation:   TTE 8/17/23    Technically challenging study with poor endocardial visualization.    Left Ventricle: The left ventricle is normal in size. Normal wall thickness. Regional wall motion abnormalities present, see diagram for wall motion findings. There is normal systolic function with a visually estimated ejection fraction of 60 - 65%. There is normal diastolic function.    Right Ventricle: Normal right ventricular cavity size. Wall thickness is normal. Right ventricle wall motion  is normal. Systolic function is normal.    Aorta: Aortic root is mildly dilated measuring 4.2 cm. Ascending aorta is mildly dilated measuring 3.9 cm.    IVC/SVC: Normal venous pressure at 3 mmHg.

## 2024-12-26 NOTE — ASSESSMENT & PLAN NOTE
Stroke RF  Counseled patient on weight loss and stroke risk reduction counseling patient on diet, exercise, and weight loss.  PT/OT erik

## 2024-12-26 NOTE — NURSING
Patient Transferred to NPU Room 903 @1545      Upon arrival to the floor, patient greeted and oriented to room. Complete head to toe assessment completed per protocol. VSS, see flowsheet for details. Neuro assessment completed. Primary team notified of patient's transfer to floor. All current and transfer orders reviewed/reconciled per protocol. All emergency equipment set up in patient's room. Fall/seizure precautions initiated per providers orders. 4 Eyes skin assessment performed, see below for details. Reviewed assessment and rounding frequency with patient and family. Questions were encouraged and addressed. Repositioned patient for comfort with bed locked in lowest position, side rails up x 2, bed alarm set, and call light within reach. Instructed patient to call staff for mobility/assistance, verbalized understanding. No acute signs of distress noted at this time.         NIHSS assessment completed on floor arrival. Patient's NIHSS score is 1 at this time. SCDs applied to patient per provider's orders. [place] bedside swallow screen completed per stroke protocol. Patient has passed melton bedside swallow screen, team notified of results. Stroke book individualized to patient and given to patient upon transfer. Reviewed stroke book with the patient at the bedside, see education flowsheets for details.

## 2024-12-26 NOTE — ED TRIAGE NOTES
Naldo Baraakt, a 59 y.o. male presents to the ED w/ complaint of headache and confusion that started at 3 pm yesterday.     Triage note:  Chief Complaint   Patient presents with    Headache     Pt endorsing headache and confusion beginning at 3pm yesterday with visual loss persisting to today. Confusing with daily tasks     Review of patient's allergies indicates:   Allergen Reactions    Erythromycin     Macrolide antibiotics      Past Medical History:   Diagnosis Date    Allergy     CAD (coronary artery disease)     Eczema     Essential (primary) hypertension     NSTEMI (non-ST elevated myocardial infarction)

## 2024-12-26 NOTE — ED PROVIDER NOTES
Encounter Date: 12/26/2024       History     Chief Complaint   Patient presents with    Headache     Pt endorsing headache and confusion beginning at 3pm yesterday with visual loss persisting to today. Confusing with daily tasks     Patient is a 58 y/o male presents today for a headache and blurry vision. PMHx CAD, ocular migraines and NSTEMI. Presents today with a head, confusion, and blurry vision after symptoms started yesterday at 4 pm.  Said he got blurry vision at that time and a headache. Since then went into work this morning, confusion doing events and tasks that he normally does.  Wife has not noticed any deficits and a single side, weakness, or changes in speech patterns and him.  Endorses taking all of his daily medications.  Allergic to erythromycin and macrolide antibiotics.        Review of patient's allergies indicates:   Allergen Reactions    Erythromycin     Macrolide antibiotics      Past Medical History:   Diagnosis Date    Allergy     CAD (coronary artery disease)     Eczema     Essential (primary) hypertension     NSTEMI (non-ST elevated myocardial infarction)      Past Surgical History:   Procedure Laterality Date    COLONOSCOPY N/A 2/15/2016    Procedure: COLONOSCOPY;  Surgeon: STONE Sanchez MD;  Location: [facility] ENDO ([address] [place] [address]);  Service: Endoscopy;  Laterality: N/A;  PM Prep    HERNIA REPAIR      LEFT HEART CATHETERIZATION Left 8/17/2023    Procedure: Left heart cath;  Surgeon: Sanchez Burden MD;  Location: [facility] CATH LAB;  Service: Cardiology;  Laterality: Left;     Family History   Problem Relation Name Age of Onset    Cancer Mother          lung    Breast cancer Mother      Cancer Father          liver    Lung disease Sister      No Known Problems Brother      No Known Problems Sister      Asthma Neg Hx       Social History     Tobacco Use    Smoking status: Never     Passive exposure: Never    Smokeless tobacco: Never   Substance Use Topics    Alcohol use: Yes     Comment: limited      Review of Systems    Physical Exam     Initial Vitals [12/26/24 1106]   BP Pulse Resp Temp SpO2   (!) 152/107 66 20 98.6 °F (37 °C) 97 %      MAP       --         Physical Exam    Nursing note and vitals reviewed.  Constitutional: He appears well-developed and well-nourished. He is not diaphoretic. No distress.   HENT:   Head: Normocephalic and atraumatic.   Eyes: EOM are normal. Pupils are equal, round, and reactive to light.   Neck:   Normal range of motion.  Cardiovascular:  Normal rate, regular rhythm and normal heart sounds.           No murmur heard.  Pulmonary/Chest: Breath sounds normal. No respiratory distress. He has no wheezes.   Abdominal: Abdomen is soft. There is no abdominal tenderness. There is no rebound.   Musculoskeletal:         General: No tenderness or edema. Normal range of motion.      Cervical back: Normal range of motion.     Neurological: He is alert and oriented to person, place, and time. No cranial nerve deficit.   +blurry vision, +R sided visual field loss , equal strength and sensation throughout,  equal finger-to-nose, no pronator drift   Skin: Skin is warm and dry. Capillary refill takes less than 2 seconds. No rash noted. No erythema. No pallor.   Psychiatric: He has a normal mood and affect.         ED Course   Procedures  Labs Reviewed   CBC W/ AUTO DIFFERENTIAL - Abnormal       Result Value    WBC 7.94      RBC 4.45 (*)     Hemoglobin 15.2      Hematocrit 43.4      MCV 98      MCH 34.2 (*)     MCHC 35.0      RDW 12.8      Platelets 260      MPV 9.3      Immature Granulocytes 0.3      Gran # (ANC) 5.9      Immature Grans (Abs) 0.02      Lymph # 1.5      Mono # 0.4      Eos # 0.0      Baso # 0.04      nRBC 0      Gran % 74.3 (*)     Lymph % 19.1      Mono % 5.4      Eosinophil % 0.4      Basophil % 0.5      Differential Method Automated     POCT GLUCOSE, HAND-HELD DEVICE - Abnormal    POC Glucose 154 (*)    POCT GLUCOSE - Abnormal    POCT Glucose 154 (*)    ISTAT PROCEDURE -  Abnormal    POC Glucose 151 (*)     POC BUN 15      POC Creatinine 1.3      POC Sodium 139      POC Potassium 3.7      POC Chloride 103      POC TCO2 (MEASURED) 25      POC Ionized Calcium 1.22      POC Hematocrit 45      Sample JOSE LUIS     COMPREHENSIVE METABOLIC PANEL   PROTIME-INR   TSH   LIPID PANEL   ISTAT PROCEDURE    POC PTWBT 10.2      POC PTINR 1.0      Sample unknown     ISTAT CREATININE    POC Creatinine 1.4      Sample unknown            Imaging Results               CTA STROKE MULTI-PHASE (Final result)  Result time 12/26/24 11:45:42      Final result by Wolf Gomez MD (12/26/24 11:45:42)                   Impression:      Large acute left PCA distribution infarct.    Possible occlusion of the distal left PCA.  Otherwise unremarkable CT arteriogram of the head and neck without significant atherosclerotic change.    This report was flagged in Epic as abnormal.      Electronically signed by: Wolf Gomez MD  Date:    12/26/2024  Time:    11:45               Narrative:    EXAMINATION:  CTA STROKE MULTI-PHASE    CLINICAL HISTORY:  Neuro deficit, acute, stroke suspected;    TECHNIQUE:  Axial CT images obtained throughout the region of the head before the administration of intravenous contrast.    CT angiogram was performed through the cervical and intracranial vasculature during the IV bolus administration of 100mL of Omnipaque 350.  Two additional phases through the intracranial vasculature via multiphase technique.    Multiplanar MPR and MIP reformats were performed.    CT source data was analyzed using artificial intelligence software for detection of a large vessel occlusions (LVO) in order to enable computer assisted triage notification and aid clinical stroke decision making.    COMPARISON:  None    FINDINGS:  The ventricles are normal in size without evidence of hydrocephalus.    Large area of hypoattenuation involving the paramedian left occipital lobe extending into posteromedial temporal lobe  in keeping with an acute infarction in the PCA distribution.  No significant mass effect or hemorrhage.  Additional small focus of hypoattenuation thought to reflect recent infarction in the left thalamus.  No evidence of remote major vascular distribution infarct.  No acute parenchymal hemorrhage.  No significant intracranial mass effect or midline shift.    No extra-axial blood or fluid collections.    The cranium appears intact.    Lobular opacity left maxillary sinus, presumed mucous retention cyst.  Mastoid air cells are clear.      CTA:    The aortic arch demonstrates no significant stenosis at the major branch vessel origins.    The right common carotid artery is normal in caliber.  No significant stenosis at the carotid bifurcation.The right internal carotid artery is normal in caliber.    The left common carotid artery is normal in caliber. No significant stenosis at the carotid bifurcation.The left internal carotid artery is normal in caliber.    The right vertebral artery is normal in caliber.    The left vertebral artery is developmentally hypoplastic.  The proximal aspect of the artery partially obscured by adjacent venous contrast and streak artifact.  Vessel otherwise unremarkable.    Basilar artery is within normal limits without focal abnormality.    The proximal anterior, middle, and posterior cerebral arteries are within normal limits.  Question narrowing or occlusion of the distal left PCA, evaluation confounded by adjacent venous enhancement.  No evidence of significant stenosis, focal occlusion, or intracranial aneurysm elsewhere.  Symmetric washout on phase 2 and phase 3 images.    Findings were immediately relayed upon identification to the ordering provider (Kennedy) via the epic secure ClickDelivery system at approximately 11:35.                                       Medications   iohexoL (OMNIPAQUE 350) injection 75 mL (75 mLs Intravenous Given 12/26/24 1127)     Medical Decision Making  Patient is  a 58 y/o male presents today for a headache and blurry vision. PMHx CAD, ocular migraines and NSTEMI. Presents today with a head, confusion, and blurry vision after symptoms started yesterday at 4 pm. Endorsing blurry vision, headache, and R sided visual field loss, as well as dialectical memory loss. On physical exam positive for blurred vision and right sided visual field loss; additionally had some trouble remembering the year it was. Vascular neurology consulted also evaluated the patient. CTA stroke was positive for a large acute left PCA infarct.  At this time we will be started on DAPT and admitted to the stroke service for further workup.        Amount and/or Complexity of Data Reviewed  Labs: ordered.  Radiology: ordered.    Risk  Prescription drug management.                                      Clinical Impression:  Final diagnoses:  [R29.818] Acute focal neurological deficit                 Ortega Cloud MD  Resident  12/26/24 6799

## 2024-12-27 ENCOUNTER — CLINICAL SUPPORT (OUTPATIENT)
Dept: CARDIOLOGY | Facility: HOSPITAL | Age: 59
DRG: 066 | End: 2024-12-27
Attending: STUDENT IN AN ORGANIZED HEALTH CARE EDUCATION/TRAINING PROGRAM
Payer: COMMERCIAL

## 2024-12-27 VITALS
BODY MASS INDEX: 39.34 KG/M2 | SYSTOLIC BLOOD PRESSURE: 144 MMHG | OXYGEN SATURATION: 93 % | HEART RATE: 87 BPM | DIASTOLIC BLOOD PRESSURE: 95 MMHG | RESPIRATION RATE: 18 BRPM | WEIGHT: 281 LBS | TEMPERATURE: 99 F | HEIGHT: 71 IN

## 2024-12-27 DIAGNOSIS — I63.532 ACUTE ISCHEMIC LEFT PCA STROKE: ICD-10-CM

## 2024-12-27 PROBLEM — E87.6 HYPOKALEMIA: Status: ACTIVE | Noted: 2024-12-27

## 2024-12-27 PROBLEM — R73.03 PRE-DIABETES: Status: ACTIVE | Noted: 2024-12-27

## 2024-12-27 PROBLEM — I25.10 CAD (CORONARY ARTERY DISEASE): Chronic | Status: ACTIVE | Noted: 2024-12-27

## 2024-12-27 PROBLEM — I25.10 CAD (CORONARY ARTERY DISEASE): Status: ACTIVE | Noted: 2024-12-27

## 2024-12-27 LAB
ALBUMIN SERPL BCP-MCNC: 3.8 G/DL (ref 3.5–5.2)
ALP SERPL-CCNC: 89 U/L (ref 40–150)
ALT SERPL W/O P-5'-P-CCNC: 37 U/L (ref 10–44)
ANION GAP SERPL CALC-SCNC: 13 MMOL/L (ref 8–16)
APTT PPP: 26.3 SEC (ref 21–32)
ASCENDING AORTA: 3.9 CM
AST SERPL-CCNC: 20 U/L (ref 10–40)
AV AREA BY CONTINUOUS VTI: 6.1 CM2
AV INDEX (PROSTH): 1.42
AV LVOT MEAN GRADIENT: 2 MMHG
AV LVOT PEAK GRADIENT: 3 MMHG
AV MEAN GRADIENT: 2 MMHG
AV PEAK GRADIENT: 3.2 MMHG
AV VALVE AREA BY VELOCITY RATIO: 4.2 CM²
AV VALVE AREA: 5.9 CM2
AV VELOCITY RATIO: 1
BASOPHILS # BLD AUTO: 0.04 K/UL (ref 0–0.2)
BASOPHILS NFR BLD: 0.5 % (ref 0–1.9)
BILIRUB SERPL-MCNC: 1 MG/DL (ref 0.1–1)
BSA FOR ECHO PROCEDURE: 2.53 M2
BUN SERPL-MCNC: 14 MG/DL (ref 8–23)
CALCIUM SERPL-MCNC: 9.2 MG/DL (ref 8.7–10.5)
CHLORIDE SERPL-SCNC: 105 MMOL/L (ref 95–110)
CO2 SERPL-SCNC: 22 MMOL/L (ref 23–29)
CREAT SERPL-MCNC: 1.2 MG/DL (ref 0.5–1.4)
CV ECHO LV RWT: 0.48 CM
DIFFERENTIAL METHOD BLD: ABNORMAL
DOP CALC AO PEAK VEL: 0.9 M/S
DOP CALC AO VTI: 13.8 CM
DOP CALC LVOT AREA: 4.2 CM2
DOP CALC LVOT DIAMETER: 2.3 CM
DOP CALC LVOT PEAK VEL: 0.9 M/S
DOP CALC LVOT STROKE VOLUME: 81.4 CM3
DOP CALCLVOT PEAK VEL VTI: 19.6 CM
E WAVE DECELERATION TIME: 226.78 MS
E/A RATIO: 0.78
E/E' RATIO: 6.71 M/S
ECHO EF ESTIMATED: 60 %
ECHO LV POSTERIOR WALL: 1.1 CM (ref 0.6–1.1)
EJECTION FRACTION: 65 %
EOSINOPHIL # BLD AUTO: 0 K/UL (ref 0–0.5)
EOSINOPHIL NFR BLD: 0.4 % (ref 0–8)
ERYTHROCYTE [DISTWIDTH] IN BLOOD BY AUTOMATED COUNT: 12.3 % (ref 11.5–14.5)
EST. GFR  (NO RACE VARIABLE): >60 ML/MIN/1.73 M^2
FRACTIONAL SHORTENING: 32.6 % (ref 28–44)
GLUCOSE SERPL-MCNC: 147 MG/DL (ref 70–110)
HCT VFR BLD AUTO: 41.3 % (ref 40–54)
HGB BLD-MCNC: 13.9 G/DL (ref 14–18)
IMM GRANULOCYTES # BLD AUTO: 0.02 K/UL (ref 0–0.04)
IMM GRANULOCYTES NFR BLD AUTO: 0.3 % (ref 0–0.5)
INR PPP: 1 (ref 0.8–1.2)
INTERVENTRICULAR SEPTUM: 1.1 CM (ref 0.6–1.1)
LA MAJOR: 4.95 CM
LA MINOR: 4.7 CM
LA WIDTH: 4.17 CM
LEFT ATRIUM SIZE: 3.24 CM
LEFT ATRIUM VOLUME INDEX MOD: 20 ML/M2
LEFT ATRIUM VOLUME INDEX: 22.7 ML/M2
LEFT ATRIUM VOLUME MOD: 48.89 ML
LEFT ATRIUM VOLUME: 55.37 CM3
LEFT INTERNAL DIMENSION IN SYSTOLE: 3.1 CM (ref 2.1–4)
LEFT VENTRICLE DIASTOLIC VOLUME INDEX: 39.03 ML/M2
LEFT VENTRICLE DIASTOLIC VOLUME: 95.24 ML
LEFT VENTRICLE MASS INDEX: 74.3 G/M2
LEFT VENTRICLE SYSTOLIC VOLUME INDEX: 15.7 ML/M2
LEFT VENTRICLE SYSTOLIC VOLUME: 38.31 ML
LEFT VENTRICULAR INTERNAL DIMENSION IN DIASTOLE: 4.6 CM (ref 3.5–6)
LEFT VENTRICULAR MASS: 181.2 G
LV LATERAL E/E' RATIO: 4.7
LV SEPTAL E/E' RATIO: 11.75
LYMPHOCYTES # BLD AUTO: 1.6 K/UL (ref 1–4.8)
LYMPHOCYTES NFR BLD: 21.7 % (ref 18–48)
MAGNESIUM SERPL-MCNC: 1.7 MG/DL (ref 1.6–2.6)
MCH RBC QN AUTO: 33 PG (ref 27–31)
MCHC RBC AUTO-ENTMCNC: 33.7 G/DL (ref 32–36)
MCV RBC AUTO: 98 FL (ref 82–98)
MONOCYTES # BLD AUTO: 0.7 K/UL (ref 0.3–1)
MONOCYTES NFR BLD: 9.5 % (ref 4–15)
MV PEAK A VEL: 0.6 M/S
MV PEAK E VEL: 0.47 M/S
NEUTROPHILS # BLD AUTO: 5.1 K/UL (ref 1.8–7.7)
NEUTROPHILS NFR BLD: 67.6 % (ref 38–73)
NRBC BLD-RTO: 0 /100 WBC
OHS CV RV/LV RATIO: 0.91 CM
PHOSPHATE SERPL-MCNC: 3.4 MG/DL (ref 2.7–4.5)
PLATELET # BLD AUTO: 236 K/UL (ref 150–450)
PMV BLD AUTO: 9.5 FL (ref 9.2–12.9)
POTASSIUM SERPL-SCNC: 3.2 MMOL/L (ref 3.5–5.1)
PROT SERPL-MCNC: 6.7 G/DL (ref 6–8.4)
PROTHROMBIN TIME: 10.7 SEC (ref 9–12.5)
RA MAJOR: 5.32 CM
RA PRESSURE ESTIMATED: 3 MMHG
RA WIDTH: 3.46 CM
RBC # BLD AUTO: 4.21 M/UL (ref 4.6–6.2)
RIGHT ATRIAL AREA: 17.2 CM2
RIGHT VENTRICLE DIASTOLIC BASEL DIMENSION: 4.2 CM
RV TISSUE DOPPLER FREE WALL SYSTOLIC VELOCITY 1 (APICAL 4 CHAMBER VIEW): 16.52 CM/S
SINUS: 3.35 CM
SODIUM SERPL-SCNC: 140 MMOL/L (ref 136–145)
STJ: 3.17 CM
TDI LATERAL: 0.1 M/S
TDI SEPTAL: 0.04 M/S
TDI: 0.07 M/S
TRICUSPID ANNULAR PLANE SYSTOLIC EXCURSION: 2.18 CM
WBC # BLD AUTO: 7.48 K/UL (ref 3.9–12.7)
Z-SCORE OF LEFT VENTRICULAR DIMENSION IN END DIASTOLE: -9.58
Z-SCORE OF LEFT VENTRICULAR DIMENSION IN END SYSTOLE: -6.61

## 2024-12-27 PROCEDURE — 92610 EVALUATE SWALLOWING FUNCTION: CPT

## 2024-12-27 PROCEDURE — 63600175 PHARM REV CODE 636 W HCPCS

## 2024-12-27 PROCEDURE — 36415 COLL VENOUS BLD VENIPUNCTURE: CPT

## 2024-12-27 PROCEDURE — 97166 OT EVAL MOD COMPLEX 45 MIN: CPT

## 2024-12-27 PROCEDURE — 84100 ASSAY OF PHOSPHORUS: CPT

## 2024-12-27 PROCEDURE — 80053 COMPREHEN METABOLIC PANEL: CPT

## 2024-12-27 PROCEDURE — 63600175 PHARM REV CODE 636 W HCPCS: Performed by: NURSE PRACTITIONER

## 2024-12-27 PROCEDURE — 92523 SPEECH SOUND LANG COMPREHEN: CPT

## 2024-12-27 PROCEDURE — 85610 PROTHROMBIN TIME: CPT

## 2024-12-27 PROCEDURE — 85730 THROMBOPLASTIN TIME PARTIAL: CPT

## 2024-12-27 PROCEDURE — 97535 SELF CARE MNGMENT TRAINING: CPT

## 2024-12-27 PROCEDURE — 25000003 PHARM REV CODE 250

## 2024-12-27 PROCEDURE — 85025 COMPLETE CBC W/AUTO DIFF WBC: CPT

## 2024-12-27 PROCEDURE — 83735 ASSAY OF MAGNESIUM: CPT

## 2024-12-27 PROCEDURE — 97161 PT EVAL LOW COMPLEX 20 MIN: CPT

## 2024-12-27 PROCEDURE — 93271 ECG/MONITORING AND ANALYSIS: CPT

## 2024-12-27 PROCEDURE — 99233 SBSQ HOSP IP/OBS HIGH 50: CPT | Mod: ,,, | Performed by: PSYCHIATRY & NEUROLOGY

## 2024-12-27 RX ORDER — MAGNESIUM SULFATE HEPTAHYDRATE 40 MG/ML
2 INJECTION, SOLUTION INTRAVENOUS ONCE
Status: COMPLETED | OUTPATIENT
Start: 2024-12-27 | End: 2024-12-27

## 2024-12-27 RX ORDER — HYDROCHLOROTHIAZIDE 25 MG/1
25 TABLET ORAL DAILY
Qty: 90 TABLET | Refills: 0 | Status: SHIPPED | OUTPATIENT
Start: 2024-12-27 | End: 2025-03-27

## 2024-12-27 RX ORDER — NAPROXEN SODIUM 220 MG/1
81 TABLET, FILM COATED ORAL DAILY
COMMUNITY
Start: 2024-12-27 | End: 2025-12-27

## 2024-12-27 RX ORDER — ACETAMINOPHEN 500 MG
1000 TABLET ORAL EVERY 6 HOURS PRN
Status: DISCONTINUED | OUTPATIENT
Start: 2024-12-27 | End: 2024-12-27 | Stop reason: HOSPADM

## 2024-12-27 RX ORDER — METOCLOPRAMIDE HYDROCHLORIDE 5 MG/ML
5 INJECTION INTRAMUSCULAR; INTRAVENOUS ONCE
Status: COMPLETED | OUTPATIENT
Start: 2024-12-27 | End: 2024-12-27

## 2024-12-27 RX ORDER — METOPROLOL SUCCINATE 50 MG/1
50 TABLET, EXTENDED RELEASE ORAL DAILY
Qty: 90 TABLET | Refills: 3 | Status: SHIPPED | OUTPATIENT
Start: 2024-12-27

## 2024-12-27 RX ORDER — LOSARTAN POTASSIUM 50 MG/1
50 TABLET ORAL DAILY
Qty: 90 TABLET | Refills: 3 | Status: SHIPPED | OUTPATIENT
Start: 2024-12-27

## 2024-12-27 RX ORDER — CLOPIDOGREL BISULFATE 75 MG/1
75 TABLET ORAL DAILY
Qty: 20 TABLET | Refills: 0 | Status: SHIPPED | OUTPATIENT
Start: 2024-12-28 | End: 2025-01-17

## 2024-12-27 RX ORDER — ATORVASTATIN CALCIUM 80 MG/1
80 TABLET, FILM COATED ORAL DAILY
Qty: 90 TABLET | Refills: 3 | Status: SHIPPED | OUTPATIENT
Start: 2024-12-27 | End: 2025-12-27

## 2024-12-27 RX ORDER — ATORVASTATIN CALCIUM 40 MG/1
80 TABLET, FILM COATED ORAL DAILY
Status: DISCONTINUED | OUTPATIENT
Start: 2024-12-27 | End: 2024-12-27 | Stop reason: HOSPADM

## 2024-12-27 RX ADMIN — POTASSIUM BICARBONATE 40 MEQ: 391 TABLET, EFFERVESCENT ORAL at 10:12

## 2024-12-27 RX ADMIN — HEPARIN SODIUM 5000 UNITS: 5000 INJECTION INTRAVENOUS; SUBCUTANEOUS at 02:12

## 2024-12-27 RX ADMIN — ONDANSETRON 8 MG: 8 TABLET, ORALLY DISINTEGRATING ORAL at 01:12

## 2024-12-27 RX ADMIN — MAGNESIUM SULFATE HEPTAHYDRATE 2 G: 40 INJECTION, SOLUTION INTRAVENOUS at 10:12

## 2024-12-27 RX ADMIN — ASPIRIN 81 MG: 81 TABLET, COATED ORAL at 10:12

## 2024-12-27 RX ADMIN — ACETAMINOPHEN 1000 MG: 500 TABLET ORAL at 02:12

## 2024-12-27 RX ADMIN — POTASSIUM BICARBONATE 40 MEQ: 391 TABLET, EFFERVESCENT ORAL at 02:12

## 2024-12-27 RX ADMIN — MAGNESIUM SULFATE HEPTAHYDRATE 2 G: 40 INJECTION, SOLUTION INTRAVENOUS at 01:12

## 2024-12-27 RX ADMIN — METOCLOPRAMIDE 5 MG: 5 INJECTION, SOLUTION INTRAMUSCULAR; INTRAVENOUS at 01:12

## 2024-12-27 RX ADMIN — ATORVASTATIN CALCIUM 80 MG: 40 TABLET, FILM COATED ORAL at 10:12

## 2024-12-27 RX ADMIN — CLOPIDOGREL BISULFATE 75 MG: 75 TABLET ORAL at 10:12

## 2024-12-27 RX ADMIN — HEPARIN SODIUM 5000 UNITS: 5000 INJECTION INTRAVENOUS; SUBCUTANEOUS at 06:12

## 2024-12-27 NOTE — PLAN OF CARE
Problem: Adult Inpatient Plan of Care  Goal: Plan of Care Review  Outcome: Progressing  Goal: Patient-Specific Goal (Individualized)  Outcome: Progressing  Goal: Absence of Hospital-Acquired Illness or Injury  Outcome: Progressing  Goal: Optimal Comfort and Wellbeing  Outcome: Progressing  Goal: Readiness for Transition of Care  Outcome: Progressing     Problem: Infection  Goal: Absence of Infection Signs and Symptoms  Outcome: Progressing     Problem: Stroke, Ischemic (Includes Transient Ischemic Attack)  Goal: Optimal Coping  Outcome: Progressing  Goal: Effective Bowel Elimination  Outcome: Progressing  Goal: Optimal Cerebral Tissue Perfusion  Outcome: Progressing  Goal: Optimal Cognitive Function  Outcome: Progressing  Goal: Improved Communication Skills  Outcome: Progressing  Goal: Optimal Functional Ability  Outcome: Progressing  Goal: Optimal Nutrition Intake  Outcome: Progressing  Goal: Effective Oxygenation and Ventilation  Outcome: Progressing  Goal: Improved Sensorimotor Function  Outcome: Progressing  Goal: Safe and Effective Swallow  Outcome: Progressing  Goal: Effective Urinary Elimination  Outcome: Progressing     POC updated and reviewed with the patient at the bedside. Questions regarding POC were encouraged and addressed. VSS, see flowsheets. Tele maintained per provider's order. Patient is AOX 4 at this time. No acute events noted during shift. Fall, and safety precautions maintained, no signs of injury noted during shift. Patient repositioned independently in bed for comfort. Upon exiting room, patient's bed locked in low position, side rails up x 2, bed alarm on, with call light within reach. Instructed patient to call staff for mobility, verbalized understanding. Stroke book and stroke education reviewed with the patient at the bedside, see education flowsheets for details. No acute signs of distress noted at this time.

## 2024-12-27 NOTE — ASSESSMENT & PLAN NOTE
Patient's most recent potassium results are listed below.   Recent Labs     12/26/24  1122 12/27/24  0234   K 3.6 3.2*     Plan  - Replete potassium per protocol  - Monitor potassium Daily  - Patient's hypokalemia is worsening. Will adjust treatment as follows: replace

## 2024-12-27 NOTE — PLAN OF CARE
Problem: Adult Inpatient Plan of Care  Goal: Plan of Care Review  Outcome: Progressing     Problem: Stroke, Ischemic (Includes Transient Ischemic Attack)  Goal: Optimal Coping  Outcome: Progressing  Goal: Optimal Cerebral Tissue Perfusion  Outcome: Progressing  Goal: Optimal Functional Ability  Outcome: Progressing   POC reviewed and updated with the patient. Questions regarding POC were encouraged and addressed with the patient. Patient c/o of HA .Patient is AO X 4 at this time. Fall/safety precautions maintained, no signs of injury noted during shift. Upon exiting room, patient's bed locked in low position, side rails up x 3, bed alarm set, with call light within reach. Instructed patient to call staff for assistance, verbalized understanding.  No acute signs of distress noted at this time.

## 2024-12-27 NOTE — HOSPITAL COURSE
"Patient was admitted to Vascular Neurology for stroke work-up. CTA with questionable "narrowing or occlusion of the distal left PCA." MRI showed acute infarction involving a portion of the occipital lobe, the tail of the hippocampus, and the left thalamus. TTE with normal EF and no evidence of left atrial enlargement. LDL 83.2 on home Lipitor 40 mg po daily, A1c showing prediabetes (6.2%), and TSH was within normal limits. Cleared by PT/OT/SLP for discharge home with outpatient OT and SLP. At this time, patient is medically stable for discharge with follow-ups with PCP, Vascular Neurology, Neuro-Ophthalmology, and Sleep Medicine. Referrals for 30-day cardiac event monitor (hooked up prior to discharge) and OT driving test also ordered.   "

## 2024-12-27 NOTE — PT/OT/SLP EVAL
"Speech Language Pathology Evaluation  Cognitive/Bedside Swallow    Patient Name:  Naldo Barakat   MRN:  [id number]  Admitting Diagnosis: Acute ischemic left PCA stroke    Recommendations:                  General Recommendations:   ongoing assessment of cognition  Diet recommendations:  Regular, Thin   Aspiration Precautions: Meds whole 1 at a time and Standard aspiration precautions   General Precautions: Standard, fall  Communication strategies:  none    Assessment:     Naldo Barakat is a 59 y.o. male with oral and pharyngeal phases of swallow wfl.  Speech language and cognitive skills were wfl   History:     Past Medical History:   Diagnosis Date    Allergy     CAD (coronary artery disease)     Eczema     Essential (primary) hypertension     NSTEMI (non-ST elevated myocardial infarction)        Past Surgical History:   Procedure Laterality Date    COLONOSCOPY N/A 2/15/2016    Procedure: COLONOSCOPY;  Surgeon: STONE Sanchez MD;  Location: [facility] ENDO ([address] [place] [address]);  Service: Endoscopy;  Laterality: N/A;  PM Prep    HERNIA REPAIR      LEFT HEART CATHETERIZATION Left 8/17/2023    Procedure: Left heart cath;  Surgeon: Sanchez Burden MD;  Location: [facility] CATH LAB;  Service: Cardiology;  Laterality: Left;       Social History: Patient lives with spouse.        Prior diet: regular.    Occupation/hobbies/homemaking: physicist.    Subjective     "I did have difficulty remembering when this happened.  " Per pt  Patient goals: home     Pain/Comfort:  Pain Rating 1: 0/10  Pain Rating Post-Intervention 1: 0/10    Respiratory Status: Room air    Objective:     Cognitive Status:    Pt. was oriented x3 with good recall of recent and remote temporal and general information.  Immediate/delayed verbal recall was wfl with pt. Repeating 7 digits and 5 words without difficulty.  Pt. Recalled 3/3 objects after a 5 minute delay  Responses to hypothetical verbal problem solving tasks were accurate and complete.  Pt. Compared and " contrasted objects and generated multiple solutions to problems.  Thought organization and categorization skills were wfl with pt. Naming 18 animals given one minute when 15-20 are wnl.  Pt. Responded to functional math and time calculations with 100% accuracy and sequenced 4 words presented auditorily on 2/2 trials.        Receptive Language:   Comprehension:   Pt. Responded to complex yes/no questions and multi step commands with 100% accuracy and no delays in responding.        Pragmatics:    wfl    Expressive Language:  Verbal:    Verbal language skills were wfl with no evidence of aphasia.  Pt. Expressed their thoughts coherently in conversation with no evidence of confusion or word finding deficits        Motor Speech:  wnl    Voice:   wnl    Visual-Spatial:  tba    Reading:   tba     Written Expression:   tba    Oral Musculature Evaluation  Oral Musculature: WNL  Dentition: present and adequate  Secretion Management: adequate  Mucosal Quality: good  Mandibular Strength and Mobility: WNL  Oral Labial Strength and Mobility: WNL  Lingual Strength and Mobility: WNL  Velar Elevation: WNL  Buccal Strength and Mobility: WNL  Volitional Cough: strong  Volitional Swallow: no delay  Voice Prior to PO Intake: wnl    Bedside Swallow Eval:   Consistencies Assessed:  Thin liquids 3 ounces thin liquid  Solids sausage      Oral Phase:   WNL    Pharyngeal Phase:   no overt clinical signs/symptoms of aspiration  no overt clinical signs/symptoms of pharyngeal dysphagia    Compensatory Strategies  None    Treatment: education provided to pt. And spouse re role of slp and plan of care.  ST to recommend f/u with speech therapy (outpt) to participate in assessment of higher cognitive skills.  Pt.'s goals are to return to work.      Goals:   Multidisciplinary Problems       SLP Goals          Problem: SLP    Goal Priority Disciplines Outcome   SLP Goal     SLP Progressing   Description: Goals due 1/3  1.  Pt. Will participate in  ongoing assessment of higher cognitive skills  2.  Assess functional reading and writing skills                       Plan:     Patient to be seen:  3 x/week   Plan of Care expires:  01/24/25  Plan of Care reviewed with:  patient, spouse   SLP Follow-Up:  Yes       Discharge recommendations:  Therapy Intensity Recommendations at Discharge: Low Intensity Therapy (vs. no needs)   Barriers to Discharge:  None    Time Tracking:     SLP Treatment Date:   12/27/24  Speech Start Time:  1050  Speech Stop Time:  1120     Speech Total Time (min):  30 min    Billable Minutes: Eval 10 , Eval Swallow and Oral Function 8, and Self Care/Home Management Training 12    12/27/2024

## 2024-12-27 NOTE — SUBJECTIVE & OBJECTIVE
Neurologic Chief Complaint: L PCA territory infarct    Subjective:     Interval History: Patient is seen for follow-up neurological assessment and treatment recommendations: See hospital course    HPI, Past Medical, Family, and Social History remains the same as documented in the initial encounter.     Review of Systems   Eyes:  Positive for visual disturbance.     Scheduled Meds:   aspirin  81 mg Oral Daily    atorvastatin  80 mg Oral Daily    clopidogreL  75 mg Oral Daily    heparin (porcine)  5,000 Units Subcutaneous Q8H    polyethylene glycol  17 g Oral Daily    potassium bicarbonate  40 mEq Oral Q4H     Continuous Infusions:  PRN Meds:  Current Facility-Administered Medications:     acetaminophen, 1,000 mg, Oral, Q6H PRN    bisacodyL, 10 mg, Rectal, Daily PRN    labetalol, 10 mg, Intravenous, Q15 Min PRN    melatonin, 6 mg, Oral, Nightly PRN    ondansetron, 8 mg, Oral, Q8H PRN    sodium chloride 0.9%, 10 mL, Intravenous, PRN    Objective:     Vital Signs (Most Recent):  Temp: 98.5 °F (36.9 °C) (12/27/24 1202)  Pulse: 87 (12/27/24 1202)  Resp: 18 (12/27/24 1202)  BP: (!) 144/95 (12/27/24 1202)  SpO2: (!) 93 % (12/27/24 1202)  BP Location: Left arm    Vital Signs Range (Last 24H):  Temp:  [97.9 °F (36.6 °C)-99 °F (37.2 °C)]   Pulse:  [64-99]   Resp:  [18-20]   BP: (126-196)/()   SpO2:  [93 %-100 %]   BP Location: Left arm       Physical Exam  Vitals and nursing note reviewed.   Constitutional:       Appearance: Normal appearance. He is obese.   HENT:      Head: Normocephalic and atraumatic.   Cardiovascular:      Rate and Rhythm: Normal rate and regular rhythm.   Pulmonary:      Effort: Pulmonary effort is normal. No respiratory distress.   Musculoskeletal:      Right lower leg: No edema.      Left lower leg: No edema.   Skin:     General: Skin is warm and dry.   Neurological:      Mental Status: He is alert.        Neurological Exam:   LOC: alert  Attention Span: Good   Language: No aphasia  Articulation:  No dysarthria  Orientation: Person, Place, Time   Visual Fields: Superior quadrantanopsia: right  EOM (CN III, IV, VI): Full/intact  Facial Sensation (CN V): Normal  Facial Movement (CN VII): Symmetric facial expression    Motor: Arm left  Normal 5/5  Leg left  Normal 5/5  Arm right  Normal 5/5  Leg right Normal 5/5  Cerebellum: No evidence of appendicular or axial ataxia  Sensation: Intact to light touch, temperature and vibration    Laboratory:  CMP:   Recent Labs   Lab 12/27/24  0234   CALCIUM 9.2   ALBUMIN 3.8   PROT 6.7      K 3.2*   CO2 22*      BUN 14   CREATININE 1.2   ALKPHOS 89   ALT 37   AST 20   BILITOT 1.0     CBC:   Recent Labs   Lab 12/27/24  0234   WBC 7.48   RBC 4.21*   HGB 13.9*   HCT 41.3      MCV 98   MCH 33.0*   MCHC 33.7     Lipid Panel:   Recent Labs   Lab 12/26/24  1122   CHOL 150   LDLCALC 83.2   HDL 47   TRIG 99     Hgb A1C:   Recent Labs   Lab 12/26/24  1122   HGBA1C 6.2*     TSH:   Recent Labs   Lab 12/26/24  1122   TSH 1.714       Diagnostic Results     Brain Imaging   12/26/2024 MRI Brain without contrast  Acute left PCA infarct involving a portion of the occipital and tail of the hippocampus. Acute left thalamic infarct. No intracranial hemorrhage or mass effect.     Vessel Imaging   12/26/2024 CTA Stroke MP  - Large acute left PCA distribution infarct.  - Possible occlusion of the distal left PCA.  Otherwise unremarkable CT arteriogram of the head and neck without significant atherosclerotic change.    Cardiac Imaging   12/27/2024 TTE    Left Ventricle: The left ventricle is normal in size. Ventricular mass is normal. Mildly increased wall thickness. Mild septal thickening. There is concentric remodeling. Normal wall motion. There is normal systolic function with a visually estimated ejection fraction of 60 - 65%. Ejection fraction is approximately 65%. There is normal diastolic function.    Right Ventricle: Normal right ventricular cavity size. Wall thickness is  normal. Systolic function is normal.    Left Atrium: Normal left atrial size. Agitated saline study of the atrial septum is negative after vasalva maneuver, suggesting absence of intracardiac shunt at the atrial level. No patent foramen ovale confirmed by agitated saline contrast.However, it should be noted that the bubble study was very suboptimal.    Right Atrium: Right atrium is mildly dilated.    Aorta: Aortic root is normal in size measuring 3.35 cm. Ascending aorta is mildly dilated measuring 3.90 cm.    IVC/SVC: Normal venous pressure at 3 mmHg.

## 2024-12-27 NOTE — PLAN OF CARE
Regular diet with thin liquids recommended.    Problem: SLP  Goal: SLP Goal  Description: Goals due 1/3  1.  Pt. Will participate in ongoing assessment of higher cognitive skills  2.  Assess functional reading and writing skills  Outcome: Progressing

## 2024-12-27 NOTE — NURSING
AVS reviewed wit patient and family , both deny questions , IV and tele removed . Heart monitor placed on patient per order prior to discharge. Patient is riding home with family.

## 2024-12-27 NOTE — PLAN OF CARE
PT evaluation complete. Goals established and met. Pt is baseline with mobility and has no acute PT needs at this time. D/C from PT services.    Problem: Physical Therapy  Goal: Physical Therapy Goal  Description: Goals to be met by: 2025     Patient will increase functional independence with mobility by performin. Gait  x 200 feet with Stand-by Assistance using no AD. - met      Outcome: Met     2024

## 2024-12-27 NOTE — ASSESSMENT & PLAN NOTE
Patient with known CAD without history of intervention, which is controlled. Will continue home ASA and Statin and monitor for S/Sx of angina/ACS. Continue to monitor on telemetry.

## 2024-12-27 NOTE — ASSESSMENT & PLAN NOTE
Naldo Barakat is a 59 y.o. male w/ HTN, HLD, CAD, ocular migraines, hx of NSTEMI, KAUSHAL not on CPAP presents today for a headache and blurry vision. Presents today with a head, confusion, and blurry vision after symptoms started yesterday at 3 pm. Said he got blurry vision at that time and a headache. Since then went into work this morning, confusion doing events and tasks that he normally does. Wife has not noticed any deficits and a single side, weakness, or changes in speech patterns and him. Endorses taking all of his daily medications. Allergic to erythromycin and macrolide antibiotics.   NIHSS 1 for visual field cut.     Antithrombotics for secondary stroke prevention: Antiplatelets: Aspirin: 81 mg daily  Clopidogrel: 75 mg daily x 21 days    Statins for secondary stroke prevention and hyperlipidemia, if present:   Statins: Atorvastatin- 80 mg daily    Aggressive risk factor modification: HTN, HLD, Diet, Exercise, Obesity, CAD     Rehab efforts: The patient has been evaluated by a stroke team provider and the therapy needs have been fully considered based off the presenting complaints and exam findings. The following therapy evaluations are needed: OT evaluate and treat, SLP evaluate and treat, PM&R evaluate for appropriate placement    Diagnostics ordered/pending: None     VTE prophylaxis: Heparin 5000 units SQ every 8 hours  Mechanical prophylaxis: Place SCDs    BP parameters: Infarct: No intervention, SBP <220

## 2024-12-27 NOTE — PLAN OF CARE
Hospital follow-up appointment was scheduled by BLAIRE KUHN.  The patient appointment was scheduled for 1/6/25 at 10:30 am at [facility] on [facility].        Brandt KUHN, BLAIRE  Case Management  [phone]

## 2024-12-27 NOTE — PLAN OF CARE
Problem: Occupational Therapy  Goal: Occupational Therapy Goal  Description: Goals to be met by: 1/10/25     Patient will increase functional independence with ADLs by performing:    LE Dressing with Supervision.  Visual scanning to R upper quadrant with min cueing to reduce functional deficits    Outcome: Progressing     Patient tolerated OT eval. Goals and POC established. See note for further details.

## 2024-12-27 NOTE — DISCHARGE SUMMARY
"[facility] - Neurosurgery ([facility])  Vascular Neurology  Comprehensive Stroke Center  Discharge Summary     Summary:     Admit Date: 12/26/2024 11:07 AM    Discharge Date and Time:  12/27/2024 1:28 PM    Attending Physician: Anthony Atkins MD     Discharge Provider: Sonny Cardona MD    History of Present Illness: Naldo Barakat is a 59 y.o. male w/ HTN, HLD, CAD, ocular migraines, hx of NSTEMI, KAUSHAL not on CPAP presents today for a headache and blurry vision. Presents today with a head, confusion, and blurry vision after symptoms started yesterday at 3 pm. Said he got blurry vision at that time and a headache. Since then went into work this morning, confusion doing events and tasks that he normally does. Wife has not noticed any deficits and a single side, weakness, or changes in speech patterns and him. Endorses taking all of his daily medications. Allergic to erythromycin and macrolide antibiotics.     Hospital Course (synopsis of major diagnoses, care, treatment, and services provided during the course of the hospital stay): Patient was admitted to Vascular Neurology for stroke work-up. CTA with questionable "narrowing or occlusion of the distal left PCA." MRI showed acute infarction involving a portion of the occipital lobe, the tail of the hippocampus, and the left thalamus. TTE with normal EF and no evidence of left atrial enlargement. LDL 83.2 on home Lipitor 40 mg po daily, A1c showing prediabetes (6.2%), and TSH was within normal limits. Cleared by PT/OT/SLP for discharge home with outpatient OT and SLP. At this time, patient is medically stable for discharge with follow-ups with PCP, Vascular Neurology, Neuro-Ophthalmology, and Sleep Medicine. Referrals for 30-day cardiac event monitor (hooked up prior to discharge) and OT driving test also ordered.     Goals of Care Treatment Preferences:  Code Status: Full Code      Stroke Etiology: Ischemic Undetermined Cause Cryptogenic Embolism / ESUS (Embolic Stroke of " Undetermined Source)    STROKE DOCUMENTATION   Acute Stroke Times   Last Known Normal Date: 12/25/24  Last Known Normal Time: 1500  Symptom Onset Date: 12/25/24  Symptom Onset Time: 1500  Stroke Team Called Date: 12/26/24  Stroke Team Called Time: 1107  Stroke Team Arrival Date: 12/26/24  Stroke Team Arrival Time: 1108  CT Interpretation Time: 1126  Thrombolytic Therapy Recommended: No  CTA Interpretation Time: 1126  Thrombectomy Recommended: No     NIH Scale:  1a. Level of Consciousness: 0-->Alert, keenly responsive  1b. LOC Questions: 0-->Answers both questions correctly  1c. LOC Commands: 0-->Performs both tasks correctly  2. Best Gaze: 0-->Normal  3. Visual: 1-->Partial hemianopia  4. Facial Palsy: 0-->Normal symmetrical movements  5a. Motor Arm, Left: 0-->No drift, limb holds 90 (or 45) degrees for full 10 secs  5b. Motor Arm, Right: 0-->No drift, limb holds 90 (or 45) degrees for full 10 secs  6a. Motor Leg, Left: 0-->No drift, leg holds 30 degree position for full 5 secs  6b. Motor Leg, Right: 0-->No drift, leg holds 30 degree position for full 5 secs  7. Limb Ataxia: 0-->Absent  8. Sensory: 0-->Normal, no sensory loss  9. Best Language: 0-->No aphasia, normal  10. Dysarthria: 0-->Normal  11. Extinction and Inattention (formerly Neglect): 0-->No abnormality  Total (NIH Stroke Scale): 1        Modified [place] Score: 2  Holcomb Coma Scale:15   ABCD2 Score:    [id number] Score:   HAS -BLED Score:   ICH Score:   Hunt & Singletary Classification:       Assessment/Plan:     Diagnostic Results:    Brain Imaging   12/26/2024 MRI Brain without contrast  Acute left PCA infarct involving a portion of the occipital and tail of the hippocampus. Acute left thalamic infarct. No intracranial hemorrhage or mass effect.      Vessel Imaging   12/26/2024 CTA Stroke MP  - Large acute left PCA distribution infarct.  - Possible occlusion of the distal left PCA.  Otherwise unremarkable CT arteriogram of the head and neck without  significant atherosclerotic change.     Cardiac Imaging   12/27/2024 TTE    Left Ventricle: The left ventricle is normal in size. Ventricular mass is normal. Mildly increased wall thickness. Mild septal thickening. There is concentric remodeling. Normal wall motion. There is normal systolic function with a visually estimated ejection fraction of 60 - 65%. Ejection fraction is approximately 65%. There is normal diastolic function.    Right Ventricle: Normal right ventricular cavity size. Wall thickness is normal. Systolic function is normal.    Left Atrium: Normal left atrial size. Agitated saline study of the atrial septum is negative after vasalva maneuver, suggesting absence of intracardiac shunt at the atrial level. No patent foramen ovale confirmed by agitated saline contrast.However, it should be noted that the bubble study was very suboptimal.    Right Atrium: Right atrium is mildly dilated.    Aorta: Aortic root is normal in size measuring 3.35 cm. Ascending aorta is mildly dilated measuring 3.90 cm.    IVC/SVC: Normal venous pressure at 3 mmHg.    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  Service: Vascular Neurology      Recommendations:     Post-discharge complication risks: None    Stroke Education given to:  patient and family    Follow-up in Stroke Clinic in 4-6 weeks.     Discharge Plan:  Antithrombotics: Aspirin 81mg indefinitely, Clopidogrel 75mg x 21 days  Statin: Atorvastatin 80mg  Aggresive risk factor modification:  Hypertension  High Cholesterol  Diet  Exercise  Obesity  Carotid Artery disease  Pre-diabetes    Follow Up:   Follow-up Information       Anthony Wyman MD. Schedule an appointment as soon as possible for a visit in 1 week(s).    Specialty: Internal Medicine  Contact information:  [address]  [place] [id number] [phone]               [facility] VASCULAR NEUROLOGY. Schedule an appointment as soon as possible for a visit in 4 week(s).    Specialty: Vascular Neurology  Contact information:  [address] [facility] [id number] [phone]             Therapy, [facility].    Why: clinic will contact you to schedule OP therapy  Contact information:  [address] [facility] [address]  [phone]               Anthony Wyman MD .    Specialty: Internal Medicine  Contact information:  [address]  [place] [id number] [phone]                             Patient Instructions:      Ambulatory referral/consult to Ophthalmology   Standing Status: Future   Referral Priority: Routine Referral Type: Consultation   Referral Reason: Specialty Services Required   Requested Specialty: Ophthalmology   Number of Visits Requested: 1     Ambulatory referral/consult to Adult Neuropsychology   Standing Status: Future   Referral Priority: Routine Referral Type: Psychiatric   Referral Reason: Specialty Services Required   Number of Visits Requested: 1     Ambulatory referral/consult to Sleep Disorders   Standing Status: Future   Referral Priority: Routine Referral Type: Consultation   Requested Specialty: Sleep Medicine   Number of Visits Requested: 1     Ambulatory referral/consult to Physical/Occupational Therapy   Standing Status: Future   Referral  Priority: Routine Referral Type: Physical Medicine   Referral Reason: Specialty Services Required   Requested Specialty: Occupational Therapy   Number of Visits Requested: 1     Ambulatory referral/consult to Vascular Neurology   Standing Status: Future   Referral Priority: Routine Referral Type: Consultation   Referral Reason: Specialty Services Required   Requested Specialty: Vascular Neurology   Number of Visits Requested: 1     Ambulatory Referral/Consult to Speech Therapy   Standing Status: Future   Referral Priority: Routine Referral Type: Speech Therapy   Referral Reason: Specialty Services Required   Requested Specialty: Speech Pathology   Number of Visits Requested: 1     Diet Cardiac   Order Comments: See Stroke Patient Education Guide Booklet for details.     Call 911 for any of the following:   Order Comments: Call 911  right away if any of the following warning signs come on suddenly, even if the symptoms only last for a few minutes. With stroke, timing is very important.   - Warning Signs of Stroke:  - Weakness: You may feel a sudden weakness, tingling or loss of feeling on one side of your face or body.  - Vision Problems: You may have sudden double vision or trouble seeing in one or both eyes.  - Speech Problems: You may have sudden trouble talking, slured speech, or problems understanding others.  - Headache: You may have sudden, severe headache.  - Movement Problems: You may experience dizziness, a feeling of spinning, a loss of balance, a feeling of falling or blackouts.     OT driving evaluation   Standing Status: Future Standing Exp. Date: 12/27/25     Cardiac event monitor   Standing Status: Future Standing Exp. Date: 12/27/25   Order Comments: 30 day event monitor for stroke work-up     Order Specific Question Answer Comments   Cardiac Event Monitor Auto Trigger    Release to patient Immediate        Medications:  Reconciled Home Medications:      Medication List        START taking these  medications      aspirin 81 MG Chew  Take 1 tablet (81 mg total) by mouth once daily.     clopidogreL 75 mg tablet  Commonly known as: PLAVIX  Take 1 tablet (75 mg total) by mouth once daily. for 20 days  Start taking on: December 28, 2024            CHANGE how you take these medications      atorvastatin 80 MG tablet  Commonly known as: LIPITOR  Take 1 tablet (80 mg total) by mouth once daily.  What changed: how much to take     hydroCHLOROthiazide 25 MG tablet  Commonly known as: HYDRODIURIL  Take 1 tablet (25 mg total) by mouth once daily. HOLD UNTIL SUNDAY 12/29. AT THAT TIME, RESUME IF SYSTOLIC BP (THE TOP NUMBER) IS OVER 150 mmHg.  What changed: additional instructions     losartan 50 MG tablet  Commonly known as: COZAAR  Take 1 tablet (50 mg total) by mouth once daily. HOLD UNTIL SUNDAY 12/29. AT THAT TIME, RESUME IF SYSTOLIC BP (THE TOP NUMBER) IS OVER 150 mmHg.  What changed: additional instructions     metoprolol succinate 50 MG 24 hr tablet  Commonly known as: TOPROL-XL  Take 1 tablet (50 mg total) by mouth once daily. HOLD UNTIL SUNDAY 12/29. AT THAT TIME, RESUME IF SYSTOLIC BP (THE TOP NUMBER) IS OVER 150 mmHg.  What changed: additional instructions            CONTINUE taking these medications      azelastine 137 mcg (0.1 %) nasal spray  Commonly known as: ASTELIN  2 sprays (274 mcg total) by Nasal route 2 (two) times daily.     budesonide-formoterol 160-4.5 mcg 160-4.5 mcg/actuation Hfaa  Commonly known as: SYMBICORT  Inhale 2 puffs into the lungs every 12 (twelve) hours. Controller     famotidine 40 MG tablet  Commonly known as: PEPCID  Take 1 tablet (40 mg total) by mouth once daily.     fluticasone propionate 50 mcg/actuation nasal spray  Commonly known as: FLONASE  2 sprays (100 mcg total) by Each Nostril route 2 (two) times a day.     levocetirizine 5 MG tablet  Commonly known as: XYZAL  Take 1 tablet night x 5 days, then daily PRN     promethazine-dextromethorphan 6.25-15 mg/5 mL Syrp  Commonly  known as: PROMETHAZINE-DM  Take 5 mLs by mouth every 4 (four) hours as needed (cough).            STOP taking these medications      hydrALAZINE 25 MG tablet  Commonly known as: APRESOLINE              Sonny Cardona MD  [facility]  Department of Vascular Neurology   [facility] - Neurosurgery ([facility])

## 2024-12-27 NOTE — PLAN OF CARE
[facility] - Neurosurgery (Hospital)  Initial Discharge Assessment       Primary Care Provider: Anthony Wyman MD    Admission Diagnosis: Stroke [I63.9]  Acute focal neurological deficit [R29.818]    Admission Date: 12/26/2024  Expected Discharge Date: 12/27/2024    Transition of Care Barriers: (P) None    Payor: BLUE [employer]OSS [employer]EFIT / Plan: [employer] [place]UE [place]OSS [place] / Product Type: Self Funded /     Extended Emergency Contact Information  Primary Emergency Contact: Abrahan Barakatle  Address: [address] [place], [place] [address] [address] [facility]  Home Phone: [phone]  Relation: Spouse    Discharge Plan A: (P) Home with family  Discharge Plan B: (P) Home with family, Other (OP referral)      [facility]  [address] [facility] [address]  Phone: [phone] Fax: [phone]    [facility]/pharmacy #[id number] - [place] - [address]  [address] [facility] [facility]  [place] [address]  Phone: [phone] Fax: [phone]      Initial Assessment (most recent)       Adult Discharge Assessment - 12/27/24 1308          Discharge Assessment    Assessment Type Discharge Planning Assessment (P)      Confirmed/corrected address, phone number and insurance Yes (P)      Confirmed Demographics Correct on Facesheet (P)      Source of Information patient;family (P)      When was your last doctors appointment? 10/09/24 (P)      Communicated ELIZABETH with patient/caregiver Yes (P)      Reason For Admission acute ischemic left PCA stroke (P)      People in Home spouse;child(kemi), adult;grandparent(s);other relative(s) (P)      Do you expect to return to your current living situation? Yes (P)      Do you have help at home or someone to help you manage your care at home? Yes (P)      Who are your caregiver(s) and their phone number(s)? morena Gongora [phone] (P)      Prior to hospitilization cognitive status: Unable to Assess (P)      Current cognitive status: Alert/Oriented  (P)      Walking or Climbing Stairs Difficulty no (P)      Dressing/Bathing Difficulty no (P)      Home Accessibility not wheelchair accessible (P)      Home Layout Bathroom on 2nd floor;Bedroom on 2nd floor (P)      Equipment Currently Used at Home none (P)      Readmission within 30 days? No (P)      Patient currently being followed by outpatient case management? No (P)      Do you currently have service(s) that help you manage your care at home? No (P)      Do you take prescription medications? Yes (P)      Do you have prescription coverage? Yes (P)      Coverage [employer] (P)      Do you have any problems affording any of your prescribed medications? No (P)      Is the patient taking medications as prescribed? yes (P)      Who is going to help you get home at discharge? wife Fransisca (P)      How do you get to doctors appointments? car, drives self (P)      Are you on dialysis? No (P)      Do you take coumadin? No (P)      Discharge Plan A Home with family (P)      Discharge Plan B Home with family;Other (P)    OP referral    DME Needed Upon Discharge  none (P)      Discharge Plan discussed with: Spouse/sig other;Patient (P)      Name(s) and Number(s) Fransisca (P)      Transition of Care Barriers None (P)         Physical Activity    On average, how many days per week do you engage in moderate to strenuous exercise (like a brisk walk)? 4 days (P)      On average, how many minutes do you engage in exercise at this level? 40 min (P)         Financial Resource Strain    How hard is it for you to pay for the very basics like food, housing, medical care, and heating? Not hard at all (P)         Housing Stability    In the last 12 months, was there a time when you were not able to pay the mortgage or rent on time? No (P)      At any time in the past 12 months, were you homeless or living in a shelter (including now)? No (P)         Transportation Needs    Has the lack of transportation kept you from medical appointments,  meetings, work or from getting things needed for daily living? No (P)         Food Insecurity    Within the past 12 months, you worried that your food would run out before you got the money to buy more. Never true (P)      Within the past 12 months, the food you bought just didn't last and you didn't have money to get more. Never true (P)         Stress    Do you feel stress - tense, restless, nervous, or anxious, or unable to sleep at night because your mind is troubled all the time - these days? Only a little (P)         Social Isolation    How often do you feel lonely or isolated from those around you?  Rarely (P)         Alcohol Use    Q1: How often do you have a drink containing alcohol? Monthly or less (P)      Q2: How many drinks containing alcohol do you have on a typical day when you are drinking? 1 or 2 (P)      Q3: How often do you have six or more drinks on one occasion? Never (P)         Utilities    In the past 12 months has the electric, gas, oil, or water company threatened to shut off services in your home? No (P)         Health Literacy    How often do you need to have someone help you when you read instructions, pamphlets, or other written material from your doctor or pharmacy? Rarely (P)         OTHER    Name(s) of People in Home wife Fransisca, adult children, grandfather , uncle (P)                    SW met with patient and wife Fransisca at bedside.  Patient lives with spouse, adult children and several other family members in a 3 level home with steps between the 1st and 2nd floor and an elevator to the 3rd.  Patient is independent with ADL's and ambulation and does not have any DME or HH needs.  Patient is not on HD or Coumadin.  Patient will discharge home with family once medically cleared.  Patient's wife can provide support and transportation as needed.      Discharge Plan A and Plan B have been determined by review of patient's clinical status, future medical and therapeutic needs, and  coverage/benefits for post-acute care in coordination with multidisciplinary team members.    Xin Maciel, JERMAINE  [facility]  [phone]

## 2024-12-27 NOTE — PT/OT/SLP EVAL
"Occupational Therapy   Evaluation    Name: Naldo Barakat  MRN: [id number]  Admitting Diagnosis: Acute ischemic left PCA stroke  Recent Surgery: * No surgery found *      Recommendations:     Discharge Recommendations: Low Intensity Therapy  Discharge Equipment Recommendations:  none  Barriers to discharge:  None    Assessment:     Naldo Barakat is a 59 y.o. male with a medical diagnosis of Acute ischemic left PCA stroke.  He presents with some increased time required for to answer some questions in occupational profile. Noted visual deficits in R upper quadrant and ~10 degree R visual field cut but performing functional tasks well. Performance deficits affecting function: visual deficits, impaired self care skills, impaired cognition.      Rehab Prognosis: Good; patient would benefit from acute skilled OT services to address these deficits and reach maximum level of function.       Plan:     Patient to be seen 3 x/week to address the above listed problems via self-care/home management, therapeutic activities, therapeutic exercises, neuromuscular re-education  Plan of Care Expires: 01/10/25  Plan of Care Reviewed with: patient, spouse    Subjective     Chief Complaint: "I'm just worried about the prognosis of all of this"  Patient/Family Comments/goals: return to PLOF    Occupational Profile:  Living Environment: lives with their family (wife, father in law, 3  young children, brother) in a 3 story home with bedroom/bathroom on second floor. Bathroom set up: walk in shower with  no AD  Previous level of function: Patient reports they were independent with ADLs and IADLs prior to hospitalization.   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  Patient found HOB elevated with telemetry, bed alarm, SCD upon OT entry to room. Patient's wife present in room during session.     General Precautions: Standard, fall  Orthopedic Precautions: N/A  Braces: N/A  Respiratory Status: Room air    Occupational Performance:    Bed Mobility:    Patient completed Supine to Sit with supervision    Functional Mobility/Transfers:  Patient completed Sit <> Stand Transfer with stand by assistance  with  no assistive device   Functional Mobility: patient ambulated within room/hallway with stand by assistance and no AD; no LOB or SOB    Activities of Daily Living:  Grooming: stand by assistance to brush teeth in stance; some shakiness but not functionally affecting task  Upper Body Dressing: contact guard assistance to don gown like jacket  Toileting: stand by assistance to void in stance    Cognitive/Visual Perceptual:  Cognitive/Psychosocial Skills:     -       Oriented to: Person, Place, Time, and Situation   -       Follows Commands/attention:Follows one-step commands  -       Communication: clear/fluent  -       Memory: No Deficits noted  -       Safety awareness/insight to disability: intact   -       Mood/Affect/Coping skills/emotional control: Appropriate to situation  Visual/Perceptual:      -Impaired  R upper quadrant visual deficits; ~10 degree visual field loss on R side      Physical Exam:  Sensation:    -       Intact  Dominant hand:    -       R  Upper Extremity Range of Motion:     -       Right Upper Extremity: WFL  -       Left Upper Extremity: WFL  Upper Extremity Strength:    -       Right Upper Extremity: WFL  -       Left Upper Extremity: WFL   Strength:    -       Right Upper Extremity: WFL  -       Left Upper Extremity: WFL  Fine Motor Coordination:    -       Intact  Left hand thumb/finger opposition skills, Right hand thumb/finger  opposition skills, Left hand, manipulation of objects, and Right hand, manipulation of objects    AMPAC 6 Click ADL:  AMPAC Total Score: 23    Treatment & Education:    Patient educated on:   -purpose of OT and OT POC  -facilitation and education on proper body mechanics, energy conservation, and safety  -importance of early mobility and out of bed activities with staff assist  -overall benefits of therapy     All questions answered within OT scope and to patient's satisfaction    Patient left up in chair with all lines intact, call button in reach, chair alarm on, nursing notified, and wife present    GOALS:   Multidisciplinary Problems       Occupational Therapy Goals          Problem: Occupational Therapy    Goal Priority Disciplines Outcome Interventions   Occupational Therapy Goal     OT, PT/OT Progressing    Description: Goals to be met by: 1/10/25     Patient will increase functional independence with ADLs by performing:    LE Dressing with Supervision.  Visual scanning to R upper quadrant with min cueing to reduce functional deficits                         History:     Past Medical History:   Diagnosis Date    Allergy     CAD (coronary artery disease)     Eczema     Essential (primary) hypertension     NSTEMI (non-ST elevated myocardial infarction)          Past Surgical History:   Procedure Laterality Date    COLONOSCOPY N/A 2/15/2016    Procedure: COLONOSCOPY;  Surgeon: STONE Sanchez MD;  Location: [facility] ENDO ([address] [place] [address]);  Service: Endoscopy;  Laterality: N/A;  PM Prep    HERNIA REPAIR      LEFT HEART CATHETERIZATION Left 8/17/2023    Procedure: Left heart cath;  Surgeon: Sanchez Burden MD;  Location: [facility] CATH LAB;  Service: Cardiology;  Laterality: Left;       Time Tracking:     OT Date of Treatment: 12/27/24  OT Start Time: 1019  OT Stop Time: 1049  OT Total Time (min): 30 min    Billable Minutes:Evaluation 10  Self Care/Home Management 20    12/27/2024

## 2024-12-27 NOTE — ASSESSMENT & PLAN NOTE
Patients blood pressure range in the last 24 hours was: BP  Min: 122/89  Max: 196/89.The patient's inpatient anti-hypertensive regimen is listed below:  Current Antihypertensives  labetalol 20 mg/4 mL (5 mg/mL) IV syring, Every 15 min PRN, Intravenous    Plan  - BP is controlled, no changes needed to their regimen  - Holding home meds in the setting of acute stroke  [HCTZ 25 qd; losartan 50 qd, Toprol 50 qd]

## 2024-12-27 NOTE — PT/OT/SLP EVAL
"Physical Therapy Evaluation and Discharge Note    Patient Name:  Naldo Barakat   MRN:  [id number]    Recommendations:     Discharge Recommendations: No Therapy Indicated  Discharge Equipment Recommendations: none   Barriers to Discharge: None  Safest Mobility Level with Nursing: Ambulation with SBA    Assessment:     Naldo Barakat is a 59 y.o. male admitted with a medical diagnosis of Acute ischemic left PCA stroke. Patient is able to complete all visualized functional mobility with stand by assistance. They are functioning at their baseline and no longer require acute care physical therapy.    Plan:     During this hospitalization, patient does not require further acute PT services. Please re-consult if functional status changes.      Subjective     Chief Complaint: None verbalized  Patient/Family Comments/goals: "My fever is back" (pt referring to HA)  Pain Rating 1:  (unrated)  Location 1: head  Pain Addressed 1: Reposition, Distraction  Pain Rating Post-Intervention 1: 0/10    Patients cultural, spiritual, Jewish conflicts given the current situation: no    Living Environment:  Living Environment: Patient lives with their wife, KENDALL, 3 children, and brother  in  3 story house  with bed/bath on 2nd floor and walk-in shower.  Prior Level of Function: Prior to admission, patient was independent and driving and working.  Equipment Used at Home: none.  DME owned (not currently used): none  Assistance Upon Discharge: family    Objective:     Communicated with RN prior to session.  Patient found up in chair with telemetry upon PT entry to room.    General Precautions: Standard, fall   Orthopedic Precautions:N/A   Braces: N/A    Exams:  Cognitive Exam:  Patient is oriented to Person, Place, Time, Situation  RLE ROM: WFL  RLE Strength: WFL  LLE ROM: WFL  LLE Strength: WFL  Sensation: Intact light touch to BLEs    Functional Mobility:  Bed Mobility:     Seated in chair at start of session and returned to chair  Transfers:  "    Sit to Stand: stand by assistance with no AD with cues for hand placement  Gait: Patient ambulated 250' with no AD and stand by assistance.   Patient demonstrates steady gait, decreased step length, wide base of support, decreased foot clearance, flexed posture, decreased amaury, and decreased arm swing.  Patient required cues for upright posture, gluteal activation, sequencing, increased step size, and increased foot clearance.  All lines remained intact throughout ambulation trial.  Stairs:  Pt ascended/descended 1 flight of stair(s) with No Assistive Device with right handrail with Stand-by Assistance.     Therapeutic Activities and Exercises:  Patient educated on role of acute care PT and PT POC, safety while in hospital including calling nurse for mobility, and call light usage  Pt educated on the effects of bed rest and the importance of OOB activity. Pt encouraged to sit UIC majority of day as tolerated and continue daily ambulation with nursing assist. Pt verbalized understanding.  Educated about gradual progression of activity once out of hospital  Educated about safety with functional mobility  Answered all questions within PT scope of practice to patient's satisfaction    AM-PAC 6 CLICK MOBILITY  Total Score:22     Patient left up in chair with all lines intact, call button in reach, and RN notified.    GOALS:   Multidisciplinary Problems       Physical Therapy Goals       Not on file              Multidisciplinary Problems (Resolved)          Problem: Physical Therapy    Goal Priority Disciplines Outcome Interventions   Physical Therapy Goal   (Resolved)     PT, PT/OT Met    Description: Goals to be met by: 2025     Patient will increase functional independence with mobility by performin. Gait  x 200 feet with Stand-by Assistance using no AD. - met                           History:     Past Medical History:   Diagnosis Date    Allergy     CAD (coronary artery disease)     Eczema      Essential (primary) hypertension     NSTEMI (non-ST elevated myocardial infarction)        Past Surgical History:   Procedure Laterality Date    COLONOSCOPY N/A 2/15/2016    Procedure: COLONOSCOPY;  Surgeon: STONE Sanchez MD;  Location: [facility] ENDO ([address] [place] [address]);  Service: Endoscopy;  Laterality: N/A;  PM Prep    HERNIA REPAIR      LEFT HEART CATHETERIZATION Left 8/17/2023    Procedure: Left heart cath;  Surgeon: Sanchez Burden MD;  Location: [facility] CATH LAB;  Service: Cardiology;  Laterality: Left;       Time Tracking:     PT Received On: 12/27/24  PT Start Time: 1241     PT Stop Time: 1249  PT Total Time (min): 8 min     Billable Minutes: Evaluation 8      12/27/2024

## 2024-12-27 NOTE — ASSESSMENT & PLAN NOTE
Body mass index is 39.19 kg/m². Morbid obesity complicates all aspects of disease management from diagnostic modalities to treatment. Weight loss encouraged and health benefits explained to patient.

## 2024-12-27 NOTE — ASSESSMENT & PLAN NOTE
Stroke risk factor. Stopped using CPAP about a year ago after weight loss. Referral to Sleep Medicine ordered.

## 2024-12-27 NOTE — PROGRESS NOTES
[facility] - Neurosurgery ([facility])  Vascular Neurology  Comprehensive Stroke Center  Progress Note    Assessment/Plan:     * Acute ischemic left PCA stroke  Naldo Barakat is a 59 y.o. male w/ HTN, HLD, CAD, ocular migraines, hx of NSTEMI, KAUSHAL not on CPAP presents today for a headache and blurry vision. Presents today with a head, confusion, and blurry vision after symptoms started yesterday at 3 pm. Said he got blurry vision at that time and a headache. Since then went into work this morning, confusion doing events and tasks that he normally does. Wife has not noticed any deficits and a single side, weakness, or changes in speech patterns and him. Endorses taking all of his daily medications. Allergic to erythromycin and macrolide antibiotics.   NIHSS 1 for visual field cut.     Antithrombotics for secondary stroke prevention: Antiplatelets: Aspirin: 81 mg daily  Clopidogrel: 75 mg daily x 21 days    Statins for secondary stroke prevention and hyperlipidemia, if present:   Statins: Atorvastatin- 80 mg daily    Aggressive risk factor modification: HTN, HLD, Diet, Exercise, Obesity, CAD     Rehab efforts: The patient has been evaluated by a stroke team provider and the therapy needs have been fully considered based off the presenting complaints and exam findings. The following therapy evaluations are needed: OT evaluate and treat, SLP evaluate and treat, PM&R evaluate for appropriate placement    Diagnostics ordered/pending: None     VTE prophylaxis: Heparin 5000 units SQ every 8 hours  Mechanical prophylaxis: Place SCDs    BP parameters: Infarct: No intervention, SBP <220    Pre-diabetes  A1c 6.2%. F/u PCP.    CAD (coronary artery disease)  Patient with known CAD without history of intervention, which is controlled. Will continue home ASA and Statin and monitor for S/Sx of angina/ACS. Continue to monitor on telemetry.     Hypokalemia  Patient's most recent potassium results are listed below.   Recent Labs      "12/26/24  1122 12/27/24  0234   K 3.6 3.2*     Plan  - Replete potassium per protocol  - Monitor potassium Daily  - Patient's hypokalemia is worsening. Will adjust treatment as follows: replace    Homonymous bilateral field defects, right side  Please see Acute ischemic left PCA stroke     Severe obesity (BMI 35.0-39.9) with comorbidity  Body mass index is 39.19 kg/m². Morbid obesity complicates all aspects of disease management from diagnostic modalities to treatment. Weight loss encouraged and health benefits explained to patient.    Hyperlipidemia  Stroke risk factor. LDL > 70 on home Lipitor 40 mg po daily; increase Lipitor to 80 mg po daily.     Hypertension  Patients blood pressure range in the last 24 hours was: BP  Min: 122/89  Max: 196/89.The patient's inpatient anti-hypertensive regimen is listed below:  Current Antihypertensives  labetalol 20 mg/4 mL (5 mg/mL) IV syring, Every 15 min PRN, Intravenous    Plan  - BP is controlled, no changes needed to their regimen  - Holding home meds in the setting of acute stroke  [HCTZ 25 qd; losartan 50 qd, Toprol 50 qd]      KAUSHAL (obstructive sleep apnea)  Stroke risk factor. Stopped using CPAP about a year ago after weight loss. Referral to Sleep Medicine ordered.          Patient was admitted to Vascular Neurology for stroke work-up. CTA with questionable "narrowing or occlusion of the distal left PCA." MRI showed acute infarction involving a portion of the occipital lobe, the tail of the hippocampus, and the left thalamus. TTE with normal EF and no evidence of left atrial enlargement. LDL 83.2 on home Lipitor 40 mg po daily, A1c showing prediabetes (6.2%), and TSH was within normal limits. Cleared by PT/OT/SLP for discharge home with outpatient OT and SLP. At this time, patient is medically stable for discharge with follow-ups with PCP, Vascular Neurology, Neuro-Ophthalmology, and Sleep Medicine. Referrals for 30-day cardiac event monitor and OT driving test also " ordered.     STROKE DOCUMENTATION   Acute Stroke Times   Last Known Normal Date: 12/25/24  Last Known Normal Time: 1500  Symptom Onset Date: 12/25/24  Symptom Onset Time: 1500  Stroke Team Called Date: 12/26/24  Stroke Team Called Time: 1107  Stroke Team Arrival Date: 12/26/24  Stroke Team Arrival Time: 1108  CT Interpretation Time: 1126  Thrombolytic Therapy Recommended: No  CTA Interpretation Time: 1126  Thrombectomy Recommended: No    NIH Scale:  1a. Level of Consciousness: 0-->Alert, keenly responsive  1b. LOC Questions: 0-->Answers both questions correctly  1c. LOC Commands: 0-->Performs both tasks correctly  2. Best Gaze: 0-->Normal  3. Visual: 1-->Partial hemianopia  4. Facial Palsy: 0-->Normal symmetrical movements  5a. Motor Arm, Left: 0-->No drift, limb holds 90 (or 45) degrees for full 10 secs  5b. Motor Arm, Right: 0-->No drift, limb holds 90 (or 45) degrees for full 10 secs  6a. Motor Leg, Left: 0-->No drift, leg holds 30 degree position for full 5 secs  6b. Motor Leg, Right: 0-->No drift, leg holds 30 degree position for full 5 secs  7. Limb Ataxia: 0-->Absent  8. Sensory: 0-->Normal, no sensory loss  9. Best Language: 0-->No aphasia, normal  10. Dysarthria: 0-->Normal  11. Extinction and Inattention (formerly Neglect): 0-->No abnormality  Total (NIH Stroke Scale): 1       Modified Dubois Score: 2  Marion Coma Scale:15   ABCD2 Score:    [id number] Score:   HAS -BLED Score:   ICH Score:   Hunt & Singletary Classification:      Hemorrhagic change of an Ischemic Stroke: Does this patient have an ischemic stroke with hemorrhagic changes? No     Neurologic Chief Complaint: L PCA territory infarct    Subjective:     Interval History: Patient is seen for follow-up neurological assessment and treatment recommendations: See hospital course    HPI, Past Medical, Family, and Social History remains the same as documented in the initial encounter.     Review of Systems   Eyes:  Positive for visual disturbance.      Scheduled Meds:   aspirin  81 mg Oral Daily    atorvastatin  80 mg Oral Daily    clopidogreL  75 mg Oral Daily    heparin (porcine)  5,000 Units Subcutaneous Q8H    polyethylene glycol  17 g Oral Daily    potassium bicarbonate  40 mEq Oral Q4H     Continuous Infusions:  PRN Meds:  Current Facility-Administered Medications:     acetaminophen, 1,000 mg, Oral, Q6H PRN    bisacodyL, 10 mg, Rectal, Daily PRN    labetalol, 10 mg, Intravenous, Q15 Min PRN    melatonin, 6 mg, Oral, Nightly PRN    ondansetron, 8 mg, Oral, Q8H PRN    sodium chloride 0.9%, 10 mL, Intravenous, PRN    Objective:     Vital Signs (Most Recent):  Temp: 98.5 °F (36.9 °C) (12/27/24 1202)  Pulse: 87 (12/27/24 1202)  Resp: 18 (12/27/24 1202)  BP: (!) 144/95 (12/27/24 1202)  SpO2: (!) 93 % (12/27/24 1202)  BP Location: Left arm    Vital Signs Range (Last 24H):  Temp:  [97.9 °F (36.6 °C)-99 °F (37.2 °C)]   Pulse:  [64-99]   Resp:  [18-20]   BP: (126-196)/()   SpO2:  [93 %-100 %]   BP Location: Left arm       Physical Exam  Vitals and nursing note reviewed.   Constitutional:       Appearance: Normal appearance. He is obese.   HENT:      Head: Normocephalic and atraumatic.   Cardiovascular:      Rate and Rhythm: Normal rate and regular rhythm.   Pulmonary:      Effort: Pulmonary effort is normal. No respiratory distress.   Musculoskeletal:      Right lower leg: No edema.      Left lower leg: No edema.   Skin:     General: Skin is warm and dry.   Neurological:      Mental Status: He is alert.        Neurological Exam:   LOC: alert  Attention Span: Good   Language: No aphasia  Articulation: No dysarthria  Orientation: Person, Place, Time   Visual Fields: Superior quadrantanopsia: right  EOM (CN III, IV, VI): Full/intact  Facial Sensation (CN V): Normal  Facial Movement (CN VII): Symmetric facial expression    Motor: Arm left  Normal 5/5  Leg left  Normal 5/5  Arm right  Normal 5/5  Leg right Normal 5/5  Cerebellum: No evidence of appendicular or  axial ataxia  Sensation: Intact to light touch, temperature and vibration    Laboratory:  CMP:   Recent Labs   Lab 12/27/24  0234   CALCIUM 9.2   ALBUMIN 3.8   PROT 6.7      K 3.2*   CO2 22*      BUN 14   CREATININE 1.2   ALKPHOS 89   ALT 37   AST 20   BILITOT 1.0     CBC:   Recent Labs   Lab 12/27/24 0234   WBC 7.48   RBC 4.21*   HGB 13.9*   HCT 41.3      MCV 98   MCH 33.0*   MCHC 33.7     Lipid Panel:   Recent Labs   Lab 12/26/24  1122   CHOL 150   LDLCALC 83.2   HDL 47   TRIG 99     Hgb A1C:   Recent Labs   Lab 12/26/24  1122   HGBA1C 6.2*     TSH:   Recent Labs   Lab 12/26/24  1122   TSH 1.714       Diagnostic Results     Brain Imaging   12/26/2024 MRI Brain without contrast  Acute left PCA infarct involving a portion of the occipital and tail of the hippocampus. Acute left thalamic infarct. No intracranial hemorrhage or mass effect.     Vessel Imaging   12/26/2024 CTA Stroke MP  - Large acute left PCA distribution infarct.  - Possible occlusion of the distal left PCA.  Otherwise unremarkable CT arteriogram of the head and neck without significant atherosclerotic change.    Cardiac Imaging   12/27/2024 TTE    Left Ventricle: The left ventricle is normal in size. Ventricular mass is normal. Mildly increased wall thickness. Mild septal thickening. There is concentric remodeling. Normal wall motion. There is normal systolic function with a visually estimated ejection fraction of 60 - 65%. Ejection fraction is approximately 65%. There is normal diastolic function.    Right Ventricle: Normal right ventricular cavity size. Wall thickness is normal. Systolic function is normal.    Left Atrium: Normal left atrial size. Agitated saline study of the atrial septum is negative after vasalva maneuver, suggesting absence of intracardiac shunt at the atrial level. No patent foramen ovale confirmed by agitated saline contrast.However, it should be noted that the bubble study was very suboptimal.    Right  Atrium: Right atrium is mildly dilated.    Aorta: Aortic root is normal in size measuring 3.35 cm. Ascending aorta is mildly dilated measuring 3.90 cm.    IVC/SVC: Normal venous pressure at 3 mmHg.    Sonny Cardona MD  [facility] Stroke Center  Department of Vascular Neurology   [facility] Neurosurgery [facility])

## 2024-12-28 ENCOUNTER — NURSE TRIAGE (OUTPATIENT)
Dept: ADMINISTRATIVE | Facility: CLINIC | Age: 59
End: 2024-12-28
Payer: COMMERCIAL

## 2024-12-28 NOTE — TELEPHONE ENCOUNTER
"Patient states he is s/p Acute Ischemic Left PCA Stroke on 12/25/24. Patient states he was discharged from the hospital on yesterday, 12/27/24 and currently c/o seeing "flashes of light" last night and states the flashes of light were like "static electric discharges". Patient also c/o no peripheral vision in his right eye and periodically seeing a "helmet logo".     On Call Provider, Dr. Brooks Jefferson advised that patient's symptoms are normal residual effects of his stroke.     Patient advised that his current symptoms are normal residual effects of his stroke. Patient also advised to contact the [facility] on Call Service for any worsening symptoms or additional questions. Patient states understanding of care advice.     Reason for Disposition   Nursing judgment    Additional Information   Negative: Nursing judgment   Negative: Information only call; adult is not ill or injured   Negative: Nursing judgment   Negative: Nursing judgment   Negative: Nursing judgment   Negative: Nursing judgment   Negative: Nursing judgment    Protocols used: No Guideline or Reference [id number]    "

## 2024-12-30 ENCOUNTER — TELEPHONE (OUTPATIENT)
Dept: OPHTHALMOLOGY | Facility: CLINIC | Age: 59
End: 2024-12-30
Payer: COMMERCIAL

## 2025-01-03 ENCOUNTER — DOCUMENTATION ONLY (OUTPATIENT)
Dept: REHABILITATION | Facility: HOSPITAL | Age: 60
End: 2025-01-03
Payer: COMMERCIAL

## 2025-01-03 ENCOUNTER — PATIENT OUTREACH (OUTPATIENT)
Dept: ADMINISTRATIVE | Facility: CLINIC | Age: 60
End: 2025-01-03
Payer: COMMERCIAL

## 2025-01-03 NOTE — PROGRESS NOTES
C3 nurse spoke with Naldo Barakat for a TCC post hospital discharge follow up call. The patient has a scheduled [facility] appointment with Anthony Wyman MD on 01/06/2025 @ 1030.

## 2025-01-03 NOTE — PROGRESS NOTES
Spoke to patient to reschedule incorrectly scheduled physical therapy evaluation to occupational therapy next week. Patient reports understanding and confirms speech and occupational therapy evaluation next Wednesday.    Jenna Villegas, PT, DPT

## 2025-01-05 NOTE — PROGRESS NOTES
Medical history  Coronary artery disease withNon STEMI August 2023  Hypertension   Hyperlipidemia  Pre diabetes     Medication list per med card     Social history   Tobacco use none    59-year-old male   Follow-up visit    Hospital admit December 26 to December 27th.    On December 25th he had that has some degree of confusion where he was forgetful certain things.  Not able to focused connect.  That has also tired and fatigued.  The next day December 26 he got temporary loss when going to work.  He is able to make it to work.  He then try to set up an appointment and could not remember his address.  Went to emergency room.  Imaging of CTA, MRI the brain showed left infarct in his PCA distribution in his that has infarct involving the left thalamus and left occipital.  It was noted that he did have some impairment if that has vision in his right upper lateral side.    2D echo was unrevealing.  Negative bubble study.  He is on a 30 day heart monitor.  That has no chest pain shortness for breath or palpitations at the time.  He reported no headache although this was noted on the chart.  That has no dysarthria, aphasia, that has sensory motor abnormalities involving extremity    Since then that has been improvement only in his sense that he is not tired the visual impairment has improved in he is able to remember things much better.    From the visit aspirin and Plavix was initiated.  He is on his medications of the atorvastatin 80 mg.  The medications of metoprolol XL 50 mg, losartan 50 mg, hydrochlorothiazide 25 mg that has been put on hold      Another issue is that a few days after discharge he has been having reoccurring cough.  He sounds congested in his chest.  He is congested in his head.  Yes that has a started noticing a fever above 100.  That has times when he took his temperature is 100.4° 100.9.  That has no shortness a breath or wheezing    Examination   Weight 266 lb   BMI 37.17  Pulse 96   Blood  pressure 128/82   Temperature 100.4°   Pulse ox 96%  Nasal mucosa is congested with yellow mucus  Oropharynx minimally hyperemic   Chest clear breath sounds  Heart regular rate and rhythm   Abdominal exam nontender soft no hepatosplenomegaly abdominal masses  Pulses 2+ carotid pulses no bruits  Negative Romberg testing   2+ biceps triceps 1+ knee ankle jerk reflexes   Motor strength normal    Nasal swab for influenza was positive, nasal swab for influenza B and COVID was negative    Impression   Recent posterior cerebral artery infarct  Influenza infection   Hypertension  Hyperlipidemia   Coronary artery disease with prior infarct  Pre diabetes    Plan   Promethazine DM   Tamiflu 75 mg b.i.d.  Labs of CBC chemistry     For now to continue to put a hold on metoprolol, losartan, hydrochlorothiazide.  Monitor heart rate and blood pressure at home

## 2025-01-06 ENCOUNTER — OFFICE VISIT (OUTPATIENT)
Dept: INTERNAL MEDICINE | Facility: CLINIC | Age: 60
End: 2025-01-06
Payer: COMMERCIAL

## 2025-01-06 ENCOUNTER — PATIENT MESSAGE (OUTPATIENT)
Dept: INTERNAL MEDICINE | Facility: CLINIC | Age: 60
End: 2025-01-06

## 2025-01-06 ENCOUNTER — LAB VISIT (OUTPATIENT)
Dept: LAB | Facility: HOSPITAL | Age: 60
End: 2025-01-06
Attending: INTERNAL MEDICINE
Payer: COMMERCIAL

## 2025-01-06 ENCOUNTER — PATIENT MESSAGE (OUTPATIENT)
Dept: ENDOSCOPY | Facility: HOSPITAL | Age: 60
End: 2025-01-06
Payer: COMMERCIAL

## 2025-01-06 VITALS
BODY MASS INDEX: 37.32 KG/M2 | HEIGHT: 71 IN | SYSTOLIC BLOOD PRESSURE: 134 MMHG | DIASTOLIC BLOOD PRESSURE: 86 MMHG | OXYGEN SATURATION: 96 % | HEART RATE: 95 BPM | WEIGHT: 266.56 LBS | TEMPERATURE: 100 F

## 2025-01-06 DIAGNOSIS — E78.5 HYPERLIPIDEMIA, UNSPECIFIED HYPERLIPIDEMIA TYPE: ICD-10-CM

## 2025-01-06 DIAGNOSIS — R73.03 PRE-DIABETES: ICD-10-CM

## 2025-01-06 DIAGNOSIS — I63.89 CEREBROVASCULAR ACCIDENT (CVA) DUE TO OTHER MECHANISM: ICD-10-CM

## 2025-01-06 DIAGNOSIS — J10.1 INFLUENZA A: ICD-10-CM

## 2025-01-06 DIAGNOSIS — I25.10 CORONARY ARTERY DISEASE INVOLVING NATIVE CORONARY ARTERY OF NATIVE HEART WITHOUT ANGINA PECTORIS: ICD-10-CM

## 2025-01-06 DIAGNOSIS — R05.9 COUGH, UNSPECIFIED TYPE: ICD-10-CM

## 2025-01-06 DIAGNOSIS — I63.89 CEREBROVASCULAR ACCIDENT (CVA) DUE TO OTHER MECHANISM: Primary | ICD-10-CM

## 2025-01-06 DIAGNOSIS — I10 PRIMARY HYPERTENSION: ICD-10-CM

## 2025-01-06 LAB
ANION GAP SERPL CALC-SCNC: 10 MMOL/L (ref 8–16)
BASOPHILS # BLD AUTO: 0.01 K/UL (ref 0–0.2)
BASOPHILS NFR BLD: 0.2 % (ref 0–1.9)
BUN SERPL-MCNC: 10 MG/DL (ref 8–23)
CALCIUM SERPL-MCNC: 9 MG/DL (ref 8.7–10.5)
CHLORIDE SERPL-SCNC: 106 MMOL/L (ref 95–110)
CO2 SERPL-SCNC: 23 MMOL/L (ref 23–29)
CREAT SERPL-MCNC: 1.4 MG/DL (ref 0.5–1.4)
CTP QC/QA: YES
CTP QC/QA: YES
DIFFERENTIAL METHOD BLD: ABNORMAL
EOSINOPHIL # BLD AUTO: 0 K/UL (ref 0–0.5)
EOSINOPHIL NFR BLD: 0 % (ref 0–8)
ERYTHROCYTE [DISTWIDTH] IN BLOOD BY AUTOMATED COUNT: 12.8 % (ref 11.5–14.5)
EST. GFR  (NO RACE VARIABLE): 57.2 ML/MIN/1.73 M^2
GLUCOSE SERPL-MCNC: 105 MG/DL (ref 70–110)
HCT VFR BLD AUTO: 41.4 % (ref 40–54)
HGB BLD-MCNC: 14 G/DL (ref 14–18)
IMM GRANULOCYTES # BLD AUTO: 0.01 K/UL (ref 0–0.04)
IMM GRANULOCYTES NFR BLD AUTO: 0.2 % (ref 0–0.5)
LYMPHOCYTES # BLD AUTO: 0.3 K/UL (ref 1–4.8)
LYMPHOCYTES NFR BLD: 6.8 % (ref 18–48)
MCH RBC QN AUTO: 34.3 PG (ref 27–31)
MCHC RBC AUTO-ENTMCNC: 33.8 G/DL (ref 32–36)
MCV RBC AUTO: 102 FL (ref 82–98)
MONOCYTES # BLD AUTO: 0.6 K/UL (ref 0.3–1)
MONOCYTES NFR BLD: 12 % (ref 4–15)
NEUTROPHILS # BLD AUTO: 3.7 K/UL (ref 1.8–7.7)
NEUTROPHILS NFR BLD: 80.8 % (ref 38–73)
NRBC BLD-RTO: 0 /100 WBC
PLATELET # BLD AUTO: 282 K/UL (ref 150–450)
PMV BLD AUTO: 9.5 FL (ref 9.2–12.9)
POC MOLECULAR INFLUENZA A AGN: POSITIVE
POC MOLECULAR INFLUENZA B AGN: NEGATIVE
POTASSIUM SERPL-SCNC: 4.1 MMOL/L (ref 3.5–5.1)
RBC # BLD AUTO: 4.08 M/UL (ref 4.6–6.2)
SARS-COV-2 RDRP RESP QL NAA+PROBE: NEGATIVE
SODIUM SERPL-SCNC: 139 MMOL/L (ref 136–145)
WBC # BLD AUTO: 4.58 K/UL (ref 3.9–12.7)

## 2025-01-06 PROCEDURE — 3079F DIAST BP 80-89 MM HG: CPT | Mod: CPTII,S$GLB,, | Performed by: INTERNAL MEDICINE

## 2025-01-06 PROCEDURE — 87502 INFLUENZA DNA AMP PROBE: CPT | Mod: QW,S$GLB,, | Performed by: INTERNAL MEDICINE

## 2025-01-06 PROCEDURE — 36415 COLL VENOUS BLD VENIPUNCTURE: CPT | Performed by: INTERNAL MEDICINE

## 2025-01-06 PROCEDURE — 99214 OFFICE O/P EST MOD 30 MIN: CPT | Mod: S$GLB,,, | Performed by: INTERNAL MEDICINE

## 2025-01-06 PROCEDURE — 3075F SYST BP GE 130 - 139MM HG: CPT | Mod: CPTII,S$GLB,, | Performed by: INTERNAL MEDICINE

## 2025-01-06 PROCEDURE — 80048 BASIC METABOLIC PNL TOTAL CA: CPT | Performed by: INTERNAL MEDICINE

## 2025-01-06 PROCEDURE — 1160F RVW MEDS BY RX/DR IN RCRD: CPT | Mod: CPTII,S$GLB,, | Performed by: INTERNAL MEDICINE

## 2025-01-06 PROCEDURE — 1159F MED LIST DOCD IN RCRD: CPT | Mod: CPTII,S$GLB,, | Performed by: INTERNAL MEDICINE

## 2025-01-06 PROCEDURE — 85025 COMPLETE CBC W/AUTO DIFF WBC: CPT | Performed by: INTERNAL MEDICINE

## 2025-01-06 PROCEDURE — 87635 SARS-COV-2 COVID-19 AMP PRB: CPT | Mod: QW,S$GLB,, | Performed by: INTERNAL MEDICINE

## 2025-01-06 PROCEDURE — 1111F DSCHRG MED/CURRENT MED MERGE: CPT | Mod: CPTII,S$GLB,, | Performed by: INTERNAL MEDICINE

## 2025-01-06 PROCEDURE — 3008F BODY MASS INDEX DOCD: CPT | Mod: CPTII,S$GLB,, | Performed by: INTERNAL MEDICINE

## 2025-01-06 PROCEDURE — 99999 PR PBB SHADOW E&M-EST. PATIENT-LVL IV: CPT | Mod: PBBFAC,,, | Performed by: INTERNAL MEDICINE

## 2025-01-06 RX ORDER — OSELTAMIVIR PHOSPHATE 75 MG/1
75 CAPSULE ORAL 2 TIMES DAILY
Qty: 10 CAPSULE | Refills: 0 | Status: SHIPPED | OUTPATIENT
Start: 2025-01-06 | End: 2025-01-06 | Stop reason: SDUPTHER

## 2025-01-06 RX ORDER — PROMETHAZINE HYDROCHLORIDE AND DEXTROMETHORPHAN HYDROBROMIDE 6.25; 15 MG/5ML; MG/5ML
5 SYRUP ORAL EVERY 4 HOURS PRN
Qty: 240 ML | Refills: 0 | Status: SHIPPED | OUTPATIENT
Start: 2025-01-06 | End: 2025-01-06 | Stop reason: SDUPTHER

## 2025-01-06 RX ORDER — OSELTAMIVIR PHOSPHATE 75 MG/1
75 CAPSULE ORAL 2 TIMES DAILY
Qty: 10 CAPSULE | Refills: 0 | Status: SHIPPED | OUTPATIENT
Start: 2025-01-06 | End: 2025-01-11

## 2025-01-06 RX ORDER — PROMETHAZINE HYDROCHLORIDE AND DEXTROMETHORPHAN HYDROBROMIDE 6.25; 15 MG/5ML; MG/5ML
5 SYRUP ORAL EVERY 4 HOURS PRN
Qty: 240 ML | Refills: 0 | Status: SHIPPED | OUTPATIENT
Start: 2025-01-06

## 2025-01-08 ENCOUNTER — PATIENT MESSAGE (OUTPATIENT)
Dept: INTERNAL MEDICINE | Facility: CLINIC | Age: 60
End: 2025-01-08
Payer: COMMERCIAL

## 2025-01-09 ENCOUNTER — TELEPHONE (OUTPATIENT)
Dept: INTERNAL MEDICINE | Facility: CLINIC | Age: 60
End: 2025-01-09
Payer: COMMERCIAL

## 2025-01-09 NOTE — TELEPHONE ENCOUNTER
Pt states he is on 6/10 dose of Tamiflu. Pt c/o feeling lethargic with little appetite. States he has had a couple loose BM, one of them looks bloody (dark and black in the water) Denies abd pain. Pt states he had another BM just now and it was loose but not bloody. Asking if he should continue the Tamiflu?

## 2025-01-09 NOTE — TELEPHONE ENCOUNTER
----- Message from Trent sent at 1/9/2025  9:20 AM CST -----  Contact: Pt  [phone]  Pt in regards to speaking with staff about a medication please advise.

## 2025-01-09 NOTE — TELEPHONE ENCOUNTER
Good morning I have spoken to the patient in regards to blood in the patients stool. The patient states that he is currently taking Tamiflu , and has took 6 10 dosage of the medication. The patient notice that he has been having some blood in his stool , and unsure if its related to the Tamiflu or his blood thinner medication. The patient would like to know if he should stop taking the Tamiflu due to the blood in his stool.

## 2025-01-13 ENCOUNTER — PATIENT MESSAGE (OUTPATIENT)
Dept: INTERNAL MEDICINE | Facility: CLINIC | Age: 60
End: 2025-01-13
Payer: COMMERCIAL

## 2025-01-13 ENCOUNTER — TELEPHONE (OUTPATIENT)
Dept: ELECTROPHYSIOLOGY | Facility: CLINIC | Age: 60
End: 2025-01-13
Payer: COMMERCIAL

## 2025-01-13 ENCOUNTER — HOSPITAL ENCOUNTER (OUTPATIENT)
Facility: HOSPITAL | Age: 60
Discharge: HOME | End: 2025-01-13
Attending: EMERGENCY MEDICINE | Admitting: INTERNAL MEDICINE
Payer: COMMERCIAL

## 2025-01-13 VITALS
OXYGEN SATURATION: 96 % | HEART RATE: 77 BPM | BODY MASS INDEX: 37.32 KG/M2 | HEIGHT: 71 IN | TEMPERATURE: 98 F | DIASTOLIC BLOOD PRESSURE: 89 MMHG | RESPIRATION RATE: 16 BRPM | SYSTOLIC BLOOD PRESSURE: 140 MMHG | WEIGHT: 266.56 LBS

## 2025-01-13 DIAGNOSIS — R07.9 CHEST PAIN: ICD-10-CM

## 2025-01-13 DIAGNOSIS — R55 SYNCOPE: Primary | ICD-10-CM

## 2025-01-13 PROBLEM — Z86.73 HISTORY OF ISCHEMIC LEFT PCA STROKE: Status: ACTIVE | Noted: 2024-12-26

## 2025-01-13 LAB
ALBUMIN SERPL BCP-MCNC: 3.7 G/DL (ref 3.5–5.2)
ALP SERPL-CCNC: 90 U/L (ref 40–150)
ALT SERPL W/O P-5'-P-CCNC: 55 U/L (ref 10–44)
ANION GAP SERPL CALC-SCNC: 8 MMOL/L (ref 8–16)
AST SERPL-CCNC: 44 U/L (ref 10–40)
BASOPHILS # BLD AUTO: 0.01 K/UL (ref 0–0.2)
BASOPHILS NFR BLD: 0.2 % (ref 0–1.9)
BILIRUB SERPL-MCNC: 0.9 MG/DL (ref 0.1–1)
BUN SERPL-MCNC: 9 MG/DL (ref 8–23)
CALCIUM SERPL-MCNC: 8.9 MG/DL (ref 8.7–10.5)
CHLORIDE SERPL-SCNC: 110 MMOL/L (ref 95–110)
CO2 SERPL-SCNC: 21 MMOL/L (ref 23–29)
CREAT SERPL-MCNC: 1 MG/DL (ref 0.5–1.4)
DIFFERENTIAL METHOD BLD: ABNORMAL
EOSINOPHIL # BLD AUTO: 0 K/UL (ref 0–0.5)
EOSINOPHIL NFR BLD: 0 % (ref 0–8)
ERYTHROCYTE [DISTWIDTH] IN BLOOD BY AUTOMATED COUNT: 12.3 % (ref 11.5–14.5)
EST. GFR  (NO RACE VARIABLE): >60 ML/MIN/1.73 M^2
GLUCOSE SERPL-MCNC: 111 MG/DL (ref 70–110)
HCT VFR BLD AUTO: 38.6 % (ref 40–54)
HCV AB SERPL QL IA: NORMAL
HGB BLD-MCNC: 13.2 G/DL (ref 14–18)
HIV 1+2 AB+HIV1 P24 AG SERPL QL IA: NORMAL
IMM GRANULOCYTES # BLD AUTO: 0.07 K/UL (ref 0–0.04)
IMM GRANULOCYTES NFR BLD AUTO: 1.2 % (ref 0–0.5)
LACTATE SERPL-SCNC: 0.9 MMOL/L (ref 0.5–2.2)
LYMPHOCYTES # BLD AUTO: 0.9 K/UL (ref 1–4.8)
LYMPHOCYTES NFR BLD: 15.7 % (ref 18–48)
MAGNESIUM SERPL-MCNC: 2.1 MG/DL (ref 1.6–2.6)
MCH RBC QN AUTO: 33.7 PG (ref 27–31)
MCHC RBC AUTO-ENTMCNC: 34.2 G/DL (ref 32–36)
MCV RBC AUTO: 99 FL (ref 82–98)
MONOCYTES # BLD AUTO: 0.4 K/UL (ref 0.3–1)
MONOCYTES NFR BLD: 5.9 % (ref 4–15)
NEUTROPHILS # BLD AUTO: 4.6 K/UL (ref 1.8–7.7)
NEUTROPHILS NFR BLD: 77 % (ref 38–73)
NRBC BLD-RTO: 0 /100 WBC
OHS QRS DURATION: 82 MS
OHS QTC CALCULATION: 434 MS
PLATELET # BLD AUTO: 220 K/UL (ref 150–450)
PMV BLD AUTO: 9.5 FL (ref 9.2–12.9)
POTASSIUM SERPL-SCNC: 3.9 MMOL/L (ref 3.5–5.1)
PROT SERPL-MCNC: 6.8 G/DL (ref 6–8.4)
RBC # BLD AUTO: 3.92 M/UL (ref 4.6–6.2)
SODIUM SERPL-SCNC: 139 MMOL/L (ref 136–145)
TROPONIN I SERPL DL<=0.01 NG/ML-MCNC: 4 NG/L (ref 0–35)
TROPONIN I SERPL DL<=0.01 NG/ML-MCNC: 5 NG/L (ref 0–35)
TSH SERPL DL<=0.005 MIU/L-ACNC: 1.39 UIU/ML (ref 0.4–4)
WBC # BLD AUTO: 5.92 K/UL (ref 3.9–12.7)

## 2025-01-13 PROCEDURE — 25000242 PHARM REV CODE 250 ALT 637 W/ HCPCS

## 2025-01-13 PROCEDURE — 84484 ASSAY OF TROPONIN QUANT: CPT

## 2025-01-13 PROCEDURE — 93010 ELECTROCARDIOGRAM REPORT: CPT | Mod: ,,, | Performed by: INTERNAL MEDICINE

## 2025-01-13 PROCEDURE — 84443 ASSAY THYROID STIM HORMONE: CPT

## 2025-01-13 PROCEDURE — 80053 COMPREHEN METABOLIC PANEL: CPT

## 2025-01-13 PROCEDURE — 63600175 PHARM REV CODE 636 W HCPCS

## 2025-01-13 PROCEDURE — G0378 HOSPITAL OBSERVATION PER HR: HCPCS

## 2025-01-13 PROCEDURE — 85025 COMPLETE CBC W/AUTO DIFF WBC: CPT

## 2025-01-13 PROCEDURE — 83735 ASSAY OF MAGNESIUM: CPT

## 2025-01-13 PROCEDURE — 93005 ELECTROCARDIOGRAM TRACING: CPT

## 2025-01-13 PROCEDURE — 99285 EMERGENCY DEPT VISIT HI MDM: CPT | Mod: 25

## 2025-01-13 PROCEDURE — 86803 HEPATITIS C AB TEST: CPT | Performed by: PHYSICIAN ASSISTANT

## 2025-01-13 PROCEDURE — 87389 HIV-1 AG W/HIV-1&-2 AB AG IA: CPT | Performed by: PHYSICIAN ASSISTANT

## 2025-01-13 PROCEDURE — 96360 HYDRATION IV INFUSION INIT: CPT

## 2025-01-13 PROCEDURE — 25000003 PHARM REV CODE 250

## 2025-01-13 PROCEDURE — 83605 ASSAY OF LACTIC ACID: CPT | Performed by: INTERNAL MEDICINE

## 2025-01-13 RX ORDER — GLUCAGON 1 MG
1 KIT INJECTION
Status: DISCONTINUED | OUTPATIENT
Start: 2025-01-13 | End: 2025-01-13 | Stop reason: HOSPADM

## 2025-01-13 RX ORDER — FAMOTIDINE 20 MG/1
40 TABLET, FILM COATED ORAL DAILY
Status: DISCONTINUED | OUTPATIENT
Start: 2025-01-13 | End: 2025-01-13 | Stop reason: HOSPADM

## 2025-01-13 RX ORDER — TALC
6 POWDER (GRAM) TOPICAL NIGHTLY PRN
Status: DISCONTINUED | OUTPATIENT
Start: 2025-01-13 | End: 2025-01-13 | Stop reason: HOSPADM

## 2025-01-13 RX ORDER — SODIUM CHLORIDE 0.9 % (FLUSH) 0.9 %
10 SYRINGE (ML) INJECTION EVERY 12 HOURS PRN
Status: DISCONTINUED | OUTPATIENT
Start: 2025-01-13 | End: 2025-01-13 | Stop reason: HOSPADM

## 2025-01-13 RX ORDER — ACETAMINOPHEN 500 MG
1000 TABLET ORAL EVERY 6 HOURS PRN
Status: DISCONTINUED | OUTPATIENT
Start: 2025-01-13 | End: 2025-01-13 | Stop reason: HOSPADM

## 2025-01-13 RX ORDER — BISACODYL 10 MG/1
10 SUPPOSITORY RECTAL DAILY PRN
Status: DISCONTINUED | OUTPATIENT
Start: 2025-01-13 | End: 2025-01-13 | Stop reason: HOSPADM

## 2025-01-13 RX ORDER — ALUMINUM HYDROXIDE, MAGNESIUM HYDROXIDE, AND SIMETHICONE 1200; 120; 1200 MG/30ML; MG/30ML; MG/30ML
30 SUSPENSION ORAL 4 TIMES DAILY PRN
Status: DISCONTINUED | OUTPATIENT
Start: 2025-01-13 | End: 2025-01-13 | Stop reason: HOSPADM

## 2025-01-13 RX ORDER — ONDANSETRON 8 MG/1
8 TABLET, ORALLY DISINTEGRATING ORAL EVERY 8 HOURS PRN
Status: DISCONTINUED | OUTPATIENT
Start: 2025-01-13 | End: 2025-01-13 | Stop reason: HOSPADM

## 2025-01-13 RX ORDER — POLYETHYLENE GLYCOL 3350 17 G/17G
17 POWDER, FOR SOLUTION ORAL 2 TIMES DAILY PRN
Status: DISCONTINUED | OUTPATIENT
Start: 2025-01-13 | End: 2025-01-13 | Stop reason: HOSPADM

## 2025-01-13 RX ORDER — IBUPROFEN 200 MG
16 TABLET ORAL
Status: DISCONTINUED | OUTPATIENT
Start: 2025-01-13 | End: 2025-01-13 | Stop reason: HOSPADM

## 2025-01-13 RX ORDER — ATORVASTATIN CALCIUM 40 MG/1
80 TABLET, FILM COATED ORAL DAILY
Status: DISCONTINUED | OUTPATIENT
Start: 2025-01-13 | End: 2025-01-13 | Stop reason: HOSPADM

## 2025-01-13 RX ORDER — NALOXONE HCL 0.4 MG/ML
0.02 VIAL (ML) INJECTION
Status: DISCONTINUED | OUTPATIENT
Start: 2025-01-13 | End: 2025-01-13 | Stop reason: HOSPADM

## 2025-01-13 RX ORDER — FLUTICASONE PROPIONATE 50 MCG
2 SPRAY, SUSPENSION (ML) NASAL 2 TIMES DAILY
Status: DISCONTINUED | OUTPATIENT
Start: 2025-01-13 | End: 2025-01-13 | Stop reason: HOSPADM

## 2025-01-13 RX ORDER — SIMETHICONE 80 MG
1 TABLET,CHEWABLE ORAL 4 TIMES DAILY PRN
Status: DISCONTINUED | OUTPATIENT
Start: 2025-01-13 | End: 2025-01-13 | Stop reason: HOSPADM

## 2025-01-13 RX ORDER — PROCHLORPERAZINE EDISYLATE 5 MG/ML
5 INJECTION INTRAMUSCULAR; INTRAVENOUS EVERY 6 HOURS PRN
Status: DISCONTINUED | OUTPATIENT
Start: 2025-01-13 | End: 2025-01-13 | Stop reason: HOSPADM

## 2025-01-13 RX ORDER — NAPROXEN SODIUM 220 MG/1
81 TABLET, FILM COATED ORAL DAILY
Status: DISCONTINUED | OUTPATIENT
Start: 2025-01-13 | End: 2025-01-13 | Stop reason: HOSPADM

## 2025-01-13 RX ORDER — IBUPROFEN 200 MG
24 TABLET ORAL
Status: DISCONTINUED | OUTPATIENT
Start: 2025-01-13 | End: 2025-01-13 | Stop reason: HOSPADM

## 2025-01-13 RX ORDER — DOCUSATE SODIUM 100 MG
400 CAPSULE ORAL ONCE
Status: DISCONTINUED | OUTPATIENT
Start: 2025-01-13 | End: 2025-01-13 | Stop reason: HOSPADM

## 2025-01-13 RX ORDER — CLOPIDOGREL BISULFATE 75 MG/1
75 TABLET ORAL DAILY
Status: DISCONTINUED | OUTPATIENT
Start: 2025-01-13 | End: 2025-01-13 | Stop reason: HOSPADM

## 2025-01-13 RX ADMIN — ASPIRIN 81 MG CHEWABLE TABLET 81 MG: 81 TABLET CHEWABLE at 10:01

## 2025-01-13 RX ADMIN — FAMOTIDINE 40 MG: 20 TABLET, FILM COATED ORAL at 10:01

## 2025-01-13 RX ADMIN — SODIUM CHLORIDE, SODIUM LACTATE, POTASSIUM CHLORIDE, AND CALCIUM CHLORIDE 1000 ML: .6; .31; .03; .02 INJECTION, SOLUTION INTRAVENOUS at 10:01

## 2025-01-13 RX ADMIN — ATORVASTATIN CALCIUM 80 MG: 40 TABLET, FILM COATED ORAL at 10:01

## 2025-01-13 RX ADMIN — FLUTICASONE PROPIONATE 100 MCG: 50 SPRAY, METERED NASAL at 12:01

## 2025-01-13 RX ADMIN — CLOPIDOGREL BISULFATE 75 MG: 75 TABLET ORAL at 10:01

## 2025-01-13 NOTE — HPI
Naldo Barakat is a 59 y.o. M with HTN, HLD, CAD, ocular migraines, hx of NSTEMI, recent left PCA CVA on 12/25/24, and recent flu diagnosis on 1/6/25 who presented to the ED for evaluation of syncope x2. The patient reports that around 3:30 this morning, he quickly stood up from laying chest down on an ottoman when he suddenly felt dizzy and lightheaded and passed out onto the ottoman. This episode was witnessed by his wife, Fransisca, who aids in history. He lost consciousness for ~2-3 minutes before spontaneously waking. He did not hit his head. Upon waking, he attempted to stand again, continued to feel poorly, and had another syncopal episode, which lasted ~1 minute. Upon waking from his second episode, he was diaphoretic and generally shaky but without confusion, urinary or bowel incontinence, dysarthria, or focal deficits. Of note, he was diagnosed with influenza on 1/6 and has significantly decreased PO intake since that time, along with generalized weakness, fatigue, and cough. He also reports taking multiple doses of promethazine-DM yesterday and trialing an OTC/herbal remedy for sleep last night due to increased insomnia and emotional stress over the last month. Pt denies fever, chills, chest pain, palpitations, SOB, abdominal pain, N/V/D, dysuria, confusion, or HA.    In the ED: VSSAF. CBC with stable and mild microcytic anemia, no leukocytosis. CMP with mildly elevated AST & ALT. HS Troponin within normal limits. Lactic acid 0.9. TSH 1.3. CXR without acute process. EKG c/w NSR. No medications or interventions provided in the ED. Pt placed in observation for further management.

## 2025-01-13 NOTE — DISCHARGE SUMMARY
[facility] - Emergency Dept  [facility]  Discharge Summary      Patient Name: Naldo Barakat  MRN: [id number]  DAGMAR: [id number]  Patient Class: OP- Observation  Admission Date: 1/13/2025  Hospital Length of Stay: 0 days  Discharge Date and Time: 1/13/2025  1:03 PM  Attending Physician: Kristine att. providers found   Discharging Provider: Fide Dobbs PA-C  Primary Care Provider: Anthony Wyman MD  [facility] Team: [facility] Fide Dobbs PA-C  Primary Care Team: [facility] [place]    HPI:   Naldo Barakat is a 59 y.o. M with HTN, HLD, CAD, ocular migraines, hx of NSTEMI, recent left PCA CVA on 12/25/24, and recent flu diagnosis on 1/6/25 who presented to the ED for evaluation of syncope x2. The patient reports that around 3:30 this morning, he quickly stood up from laying chest down on an ottoman when he suddenly felt dizzy and lightheaded and passed out onto the ottoman. This episode was witnessed by his wife, Fransisca, who aids in history. He lost consciousness for ~2-3 minutes before spontaneously waking. He did not hit his head. Upon waking, he attempted to stand again, continued to feel poorly, and had another syncopal episode, which lasted ~1 minute. Upon waking from his second episode, he was diaphoretic and generally shaky but without confusion, urinary or bowel incontinence, dysarthria, or focal deficits. Of note, he was diagnosed with influenza on 1/6 and has significantly decreased PO intake since that time, along with generalized weakness, fatigue, and cough. He also reports taking multiple doses of promethazine-DM yesterday and trialing an OTC/herbal remedy for sleep last night due to increased insomnia and emotional stress over the last month. Pt denies fever, chills, chest pain, palpitations, SOB, abdominal pain, N/V/D, dysuria, confusion, or HA.    In the ED: VSSAF. CBC with stable and mild microcytic anemia, no leukocytosis. CMP with mildly elevated AST & ALT. HS Troponin within normal  limits. Lactic acid 0.9. TSH 1.3. CXR without acute process. EKG c/w NSR. No medications or interventions provided in the ED. Pt placed in observation for further management.     * No surgery found *      Hospital Course:   Mr. Barakat was placed in observation for syncope, likely multifactorial in nature 2/2 dehydration/poor PO intake, recent illness, orthostasis, and possible polypharmacy of OTC remedies. Pt was given 1L of IVFs and reports feeling well. Echo from recent admission reviewed and without structural abnormalities that would contribute to syncope. The patient recently completed a 30-day cardiac event monitor; his results are pending but EKG and tele throughout admission c/w NSR and story not consistent with cardiac syncope.    Pt was seen and evaluated by me this morning, reports feeling well, and is very eager to discharge home. All questions were answered. Patient acknowledged understanding of discharge instructions and feels safe to discharge home. Patient was discharged on 1/13/2025 in stable condition with PCP follow-up. Education regarding condition provided and return precautions given.     Physical Exam  Gen: in NAD, appears stated age  Neuro: AAOx3, motor, sensory, and strength grossly intact BL  HEENT: EOMI, PERRLA; no JVD appreciated  CVS: RRR, no m/r/g  Resp: lungs CTAB, no w/r/r; no belabored breathing or accessory muscle use appreciated   Abd: NTND, soft to palpation  Extrem: no UE or LE edema BL     Goals of Care Treatment Preferences:  Code Status: Full Code        Final Active Diagnoses:    Diagnosis Date Noted POA    PRINCIPAL PROBLEM:  Syncope [R55] 01/13/2025 Yes    CAD (coronary artery disease) [I25.10] 12/27/2024 Yes     Chronic    Pre-diabetes [R73.03] 12/27/2024 Yes    History of ischemic left PCA stroke [Z86.73] 12/26/2024 Not Applicable    Severe obesity (BMI 35.0-39.9) with comorbidity [E66.01] 08/25/2023 Yes    Hypertension [I10] 08/24/2023 Yes    Hyperlipidemia [E78.5]  08/24/2023 Yes    KAUSHAL (obstructive sleep apnea) [G47.33]  Yes      Problems Resolved During this Admission:       Discharged Condition: good    Disposition: Home or Self Care    Follow Up:    Patient Instructions:      Ambulatory referral/consult to Internal Medicine   Standing Status: Future   Referral Priority: Urgent Referral Type: Consultation   Referral Reason: Specialty Services Required   Requested Specialty: Internal Medicine   Number of Visits Requested: 1     Notify your health care provider if you experience any of the following:  temperature >100.4     Notify your health care provider if you experience any of the following:  severe uncontrolled pain     Notify your health care provider if you experience any of the following:  difficulty breathing or increased cough     Notify your health care provider if you experience any of the following:  persistent dizziness, light-headedness, or visual disturbances     Notify your health care provider if you experience any of the following:  increased confusion or weakness     Activity as tolerated       Significant Diagnostic Studies: Labs: BMP:   Recent Labs   Lab 01/13/25  0729   *      K 3.9      CO2 21*   BUN 9   CREATININE 1.0   CALCIUM 8.9   MG 2.1   , CBC   Recent Labs   Lab 01/13/25  0729   WBC 5.92   HGB 13.2*   HCT 38.6*        All labs within the past 24 hours have been reviewed    Pending Diagnostic Studies:       None           Medications:  Reconciled Home Medications:      Medication List        CONTINUE taking these medications      aspirin 81 MG Chew  Take 1 tablet (81 mg total) by mouth once daily.     atorvastatin 80 MG tablet  Commonly known as: LIPITOR  Take 1 tablet (80 mg total) by mouth once daily.     azelastine 137 mcg (0.1 %) nasal spray  Commonly known as: ASTELIN  2 sprays (274 mcg total) by Nasal route 2 (two) times daily.     clopidogreL 75 mg tablet  Commonly known as: PLAVIX  Take 1 tablet (75 mg total) by  mouth once daily. for 20 days     famotidine 40 MG tablet  Commonly known as: PEPCID  Take 1 tablet (40 mg total) by mouth once daily.     fluticasone propionate 50 mcg/actuation nasal spray  Commonly known as: FLONASE  2 sprays (100 mcg total) by Each Nostril route 2 (two) times a day.     promethazine-dextromethorphan 6.25-15 mg/5 mL Syrp  Commonly known as: PROMETHAZINE-DM  Take 5 mLs by mouth every 4 (four) hours as needed (cough).       Indwelling Lines/Drains at time of discharge:   Lines/Drains/Airways       None                   Time spent on the discharge of patient: 36 minutes         Fide Dobbs PA-C  Department of Hospital Medicine  [facility] - Emergency Dept

## 2025-01-13 NOTE — ED PROVIDER NOTES
Encounter Date: 1/13/2025       History     Chief Complaint   Patient presents with    Loss of Consciousness     2 episodes of syncope PTA/ recent CVA and flu dx.     HPI    Patient is a 59-year-old male with a history of CAD, hypertension, CVA on 12/25/2024, recent flu diagnosis on 1/6/25 presenting to the ED today due to syncope x2.  Patient reports that he was laying on an Ottoman when he went to stand up.  Reported that he had felt bad, and then had fallen.  Prior to fully standing up, continued to not feel well and had another syncopal episode.  Patient has a described myoclonic jerking with the his episode.  EMS was called.  Per patient's wife, he had broken out into a sweat following the incident.    He endorses fatigue and dehydration to do so diagnosis, and chronic cough.  Denies fever, new upper respiratory symptoms, chest pain or shortness of breath, nausea, vomiting, abdominal pain.    Review of patient's allergies indicates:   Allergen Reactions    Erythromycin     Macrolide antibiotics      Past Medical History:   Diagnosis Date    Allergy     CAD (coronary artery disease)     Eczema     Essential (primary) hypertension     NSTEMI (non-ST elevated myocardial infarction)      Past Surgical History:   Procedure Laterality Date    COLONOSCOPY N/A 2/15/2016    Procedure: COLONOSCOPY;  Surgeon: STONE Sanchez MD;  Location: [facility] ENDO ([address] [place] [address]);  Service: Endoscopy;  Laterality: N/A;  PM Prep    HERNIA REPAIR      LEFT HEART CATHETERIZATION Left 8/17/2023    Procedure: Left heart cath;  Surgeon: Sanchez Burden MD;  Location: [facility] CATH LAB;  Service: Cardiology;  Laterality: Left;     Family History   Problem Relation Name Age of Onset    Cancer Mother          lung    Breast cancer Mother      Cancer Father          liver    Lung disease Sister      No Known Problems Brother      No Known Problems Sister      Asthma Neg Hx       Social History     Tobacco Use    Smoking status: Never     Passive  exposure: Never    Smokeless tobacco: Never   Substance Use Topics    Alcohol use: Yes     Comment: limited     Physical Exam     Initial Vitals [01/13/25 0522]   BP Pulse Resp Temp SpO2   133/77 69 16 97.5 °F (36.4 °C) 100 %      MAP       --         Physical Exam    Constitutional: He appears well-developed and well-nourished. He is not diaphoretic. No distress.   HENT:   Head: Normocephalic and atraumatic.   Eyes: Conjunctivae and EOM are normal.   Neck:   No cspine tenderness   Cardiovascular:  Normal rate, regular rhythm and normal heart sounds.           Pulmonary/Chest: Breath sounds normal. No respiratory distress. He has no wheezes. He has no rhonchi.   Abdominal: Abdomen is soft. He exhibits no distension. There is no abdominal tenderness.   Musculoskeletal:         General: No edema.     Neurological: He is alert. He has normal strength. No cranial nerve deficit or sensory deficit.   Skin: Skin is warm and dry.   Psychiatric: He has a normal mood and affect. Thought content normal.         ED Course   Procedures  Labs Reviewed   CBC W/ AUTO DIFFERENTIAL - Abnormal       Result Value    WBC 5.92      RBC 3.92 (*)     Hemoglobin 13.2 (*)     Hematocrit 38.6 (*)     MCV 99 (*)     MCH 33.7 (*)     MCHC 34.2      RDW 12.3      Platelets 220      MPV 9.5      Immature Granulocytes 1.2 (*)     Gran # (ANC) 4.6      Immature Grans (Abs) 0.07 (*)     Lymph # 0.9 (*)     Mono # 0.4      Eos # 0.0      Baso # 0.01      nRBC 0      Gran % 77.0 (*)     Lymph % 15.7 (*)     Mono % 5.9      Eosinophil % 0.0      Basophil % 0.2      Differential Method Automated     COMPREHENSIVE METABOLIC PANEL - Abnormal    Sodium 139      Potassium 3.9      Chloride 110      CO2 21 (*)     Glucose 111 (*)     BUN 9      Creatinine 1.0      Calcium 8.9      Total Protein 6.8      Albumin 3.7      Total Bilirubin 0.9      Alkaline Phosphatase 90      AST 44 (*)     ALT 55 (*)     eGFR >60.0      Anion Gap 8     HEPATITIS C ANTIBODY     Hepatitis C Ab Non-reactive      Narrative:     Release to patient->Immediate   HIV 1 / 2 ANTIBODY    HIV 1/2 Ag/Ab Non-reactive      Narrative:     Release to patient->Immediate   MAGNESIUM    Magnesium 2.1     TROPONIN I HIGH SENSITIVITY    Troponin I High Sensitivity 5     TROPONIN I HIGH SENSITIVITY    Troponin I High Sensitivity 4     TSH   LACTIC ACID, PLASMA   TSH    TSH 1.386      Narrative:     add on [id number] per Fide Dobbs PA-C  01/13/2025 09:55 T.Ancelmo   LACTIC ACID, PLASMA    Lactate (Lactic Acid) 0.9       EKG Readings: (Independently Interpreted)   Initial Reading: No STEMI. Rhythm: Normal Sinus Rhythm. Heart Rate: 61. Ectopy: No Ectopy. Conduction: Normal. T Waves Flipped: III and AVR. Axis: Normal. Clinical Impression: Normal Sinus Rhythm Other Impression: no Brugada syndrome, STEMI, prolonged QT, WPW, HOCM     ECG Results              EKG 12-lead (Final result)        Collection Time Result Time QRS Duration OHS QTC Calculation    01/13/25 05:28:40 01/13/25 09:44:33 82 434                     Final result by Interface, Lab In [facility] (01/13/25 09:44:41)                   Narrative:    Test Reason : R55,    Vent. Rate :  61 BPM     Atrial Rate :  61 BPM     P-R Int : 154 ms          QRS Dur :  82 ms      QT Int : 432 ms       P-R-T Axes :  41  22  19 degrees    QTcB Int : 434 ms    Normal sinus rhythm  Normal ECG  When compared with ECG of 26-Dec-2024 11:37,  No significant change was found  Confirmed by Paco Holliday ([id number]) on 1/13/2025 9:44:30 AM    Referred By: AAAREFERRAL SELF           Confirmed By: Paco Holliday                                  Imaging Results              X-Ray Chest AP Portable (Final result)  Result time 01/13/25 08:32:54      Final result by Lazarus Vasquez MD (01/13/25 08:32:54)                   Impression:      No convincing evidence of acute cardiopulmonary disease.      Electronically signed by: Lazarus Vasquez  Date:    01/13/2025  Time:    08:32                Narrative:    EXAMINATION:  XR CHEST AP PORTABLE    CLINICAL HISTORY:  syncope;    TECHNIQUE:  Single frontal view of the chest was performed.    COMPARISON:  Chest radiograph performed 08/16/2023, 23:48 hours    FINDINGS:  Monitoring leads over the chest.  Grossly unchanged cardiac contours    Lungs essentially clear.  No definite pneumothorax or large volume pleural effusion.    No acute findings in the visualized abdomen.  Osseous and soft tissue structures appear without definite acute change.                                       Medications   lactated ringers bolus 1,000 mL (0 mLs Intravenous Stopped 1/13/25 1200)     Medical Decision Making  Amount and/or Complexity of Data Reviewed  Labs: ordered. Decision-making details documented in ED Course.  Radiology: ordered.    Patient is a 59-year-old male with a history of CAD, hypertension, CVA on 12/25/2024, recent flu diagnosis on 1/6/25 presenting to the ED today due to syncope x2.    On examination, patient is asymptomatic and hemodynamically stable.  He is well-appearing.    Neuro exam is nonfocal.  CBC labs ordered.  Hemoglobin is found to be 13.2, which is decreased from previous but seems to be slowly downtrending.  Remainder of his labs relatively unremarkable.  High sensitivity troponin is 5, which is above the cut off for needing another 1.  Additional was ordered, which came back at 4.    Given patient's concerning risk factors and unclear etiology of syncope, he was admitted to Hospital Medicine for syncope workup.           ED Course as of 01/13/25 1525   Mon Jan 13, 2025   0743 Hemoglobin(!): 13.2  Slow decrease [DR]   0808 Troponin I High Sensitivity: 5 [DR]   0808 Magnesium : 2.1 [DR]      ED Course User Index  [DR] Francheska Del Valle DO                           Clinical Impression:  Final diagnoses:  [R55] Syncope (Primary)  [R07.9] Chest pain          ED Disposition Condition    Observation                 Francheska Del Valle DO  Resident  01/13/25  [id number]

## 2025-01-13 NOTE — HOSPITAL COURSE
Mr. Barakat was placed in observation for syncope, likely multifactorial in nature 2/2 dehydration/poor PO intake, recent illness, orthostasis, and possible polypharmacy of OTC remedies. Pt was given 1L of IVFs and reports feeling well. Echo from recent admission reviewed and without structural abnormalities that would contribute to syncope. The patient recently completed a 30-day cardiac event monitor; his results are pending but EKG and tele throughout admission c/w NSR and story not consistent with cardiac syncope.    Pt was seen and evaluated by me this morning, reports feeling well, and is very eager to discharge home. All questions were answered. Patient acknowledged understanding of discharge instructions and feels safe to discharge home. Patient was discharged on 1/13/2025 in stable condition with PCP follow-up. Education regarding condition provided and return precautions given.     Physical Exam  Gen: in NAD, appears stated age  Neuro: AAOx3, motor, sensory, and strength grossly intact BL  HEENT: EOMI, PERRLA; no JVD appreciated  CVS: RRR, no m/r/g  Resp: lungs CTAB, no w/r/r; no belabored breathing or accessory muscle use appreciated   Abd: NTND, soft to palpation  Extrem: no UE or LE edema BL   Statement Selected

## 2025-01-13 NOTE — ASSESSMENT & PLAN NOTE
Patient with known CAD, which is uncontrolled Will continue ASA, Plavix, and Statin and monitor for S/Sx of angina/ACS. Continue to monitor on telemetry.

## 2025-01-13 NOTE — TELEPHONE ENCOUNTER
Pt is requesting a visit. He was seen today 1/13/25 for syncope/chest pain. Attempted to schedule pt an appt but Dr. Wyman schedule is blocked. Please assist with scheduling.

## 2025-01-13 NOTE — ED NOTES
"Pt states he hasn't been feeling well and this morning around 3:30 am he stood up and "fell down". Pt states he doesn't remember anything else. +positive for flu 1/6/25. Pt denies SOB. Pt's wife states pt didn't hit his head.   "

## 2025-01-13 NOTE — ASSESSMENT & PLAN NOTE
Hemoglobin A1C   Date Value Ref Range Status   12/26/2024 6.2 (H) 4.0 - 5.6 % Final     Comment:     ADA Screening Guidelines:  5.7-6.4%  Consistent with prediabetes  >or=6.5%  Consistent with diabetes    High levels of fetal hemoglobin interfere with the HbA1C  assay. Heterozygous hemoglobin variants (HbS, HgC, etc)do  not significantly interfere with this assay.   However, presence of multiple variants may affect accuracy.     08/17/2023 5.4 4.0 - 5.6 % Final     Comment:     ADA Screening Guidelines:  5.7-6.4%  Consistent with prediabetes  >or=6.5%  Consistent with diabetes    High levels of fetal hemoglobin interfere with the HbA1C  assay. Heterozygous hemoglobin variants (HbS, HgC, etc)do  not significantly interfere with this assay.   However, presence of multiple variants may affect accuracy.     - Pt and family have already initiated dietary changes with the assistance of a nutritionist  - Diabetic diet when not NPO

## 2025-01-13 NOTE — ASSESSMENT & PLAN NOTE
Body mass index is 37.17 kg/m². Morbid obesity complicates all aspects of disease management from diagnostic modalities to treatment. Weight loss encouraged and health benefits explained to patient.

## 2025-01-13 NOTE — ASSESSMENT & PLAN NOTE
- Likely multifactorial in nature related to intravascular volume depletion, recent illness, sedating OTC/herbal remedies, etc   - VSSAF   - Labs grossly unremarkable   - EKG without any ST/T wave changes  - Orthostatics negative   - Echo from admission in December reviewed and without valvular abnormalities that could contribute to syncope  - Recently completed a 30-day cardiac event monitor, results pending but EKG and tele throughout admission c/w NSR and story not consistent with cardiac syncope   - Give 1L IVF and monitor for improvement

## 2025-01-13 NOTE — H&P
[facility] - Emergency Dept  [facility]  History & Physical    Patient Name: Naldo Barakat  MRN: [id number]  Patient Class: OP- Observation  Admission Date: 1/13/2025  Attending Physician: No att. providers found   Primary Care Provider: Anthony Wyman MD         Patient information was obtained from patient, past medical records, and ER records.     Subjective:     Principal Problem: Syncope    Chief Complaint:   Chief Complaint   Patient presents with    Loss of Consciousness     2 episodes of syncope PTA/ recent CVA and flu dx.        HPI: Naldo Barakat is a 59 y.o. M with HTN, HLD, CAD, ocular migraines, hx of NSTEMI, recent left PCA CVA on 12/25/24, and recent flu diagnosis on 1/6/25 who presented to the ED for evaluation of syncope x2. The patient reports that around 3:30 this morning, he quickly stood up from laying chest down on an ottoman when he suddenly felt dizzy and lightheaded and passed out onto the ottoman. This episode was witnessed by his wife, Fransisca, who aids in history. He lost consciousness for ~2-3 minutes before spontaneously waking. He did not hit his head. Upon waking, he attempted to stand again, continued to feel poorly, and had another syncopal episode, which lasted ~1 minute. Upon waking from his second episode, he was diaphoretic and generally shaky but without confusion, urinary or bowel incontinence, dysarthria, or focal deficits. Of note, he was diagnosed with influenza on 1/6 and has significantly decreased PO intake since that time, along with generalized weakness, fatigue, and cough. He also reports taking multiple doses of promethazine-DM yesterday and trialing an OTC/herbal remedy for sleep last night due to increased insomnia and emotional stress over the last month. Pt denies fever, chills, chest pain, palpitations, SOB, abdominal pain, N/V/D, dysuria, confusion, or HA.    In the ED: VSSAF. CBC with stable and mild microcytic anemia, no leukocytosis. CMP with mildly  elevated AST & ALT. HS Troponin within normal limits. Lactic acid 0.9. TSH 1.3. CXR without acute process. EKG c/w NSR. No medications or interventions provided in the ED. Pt placed in observation for further management.     Past Medical History:   Diagnosis Date    Allergy     CAD (coronary artery disease)     Eczema     Essential (primary) hypertension     NSTEMI (non-ST elevated myocardial infarction)        Past Surgical History:   Procedure Laterality Date    COLONOSCOPY N/A 2/15/2016    Procedure: COLONOSCOPY;  Surgeon: STONE Sanchez MD;  Location: [facility] ENDO ([facility] FLR);  Service: Endoscopy;  Laterality: N/A;  PM Prep    HERNIA REPAIR      LEFT HEART CATHETERIZATION Left 8/17/2023    Procedure: Left heart cath;  Surgeon: Sanchez Burden MD;  Location: [facility] CATH LAB;  Service: Cardiology;  Laterality: Left;       Review of patient's allergies indicates:   Allergen Reactions    Erythromycin     Macrolide antibiotics        No current facility-administered medications on file prior to encounter.     Current Outpatient Medications on File Prior to Encounter   Medication Sig    aspirin 81 MG Chew Take 1 tablet (81 mg total) by mouth once daily.    atorvastatin (LIPITOR) 80 MG tablet Take 1 tablet (80 mg total) by mouth once daily.    azelastine (ASTELIN) 137 mcg (0.1 %) nasal spray 2 sprays (274 mcg total) by Nasal route 2 (two) times daily.    clopidogreL (PLAVIX) 75 mg tablet Take 1 tablet (75 mg total) by mouth once daily. for 20 days    famotidine (PEPCID) 40 MG tablet Take 1 tablet (40 mg total) by mouth once daily.    fluticasone propionate (FLONASE) 50 mcg/actuation nasal spray 2 sprays (100 mcg total) by Each Nostril route 2 (two) times a day.    promethazine-dextromethorphan (PROMETHAZINE-DM) 6.25-15 mg/5 mL Syrp Take 5 mLs by mouth every 4 (four) hours as needed (cough).    [DISCONTINUED] budesonide-formoterol 160-4.5 mcg (SYMBICORT) 160-4.5 mcg/actuation HFAA Inhale 2 puffs into the lungs every 12  (twelve) hours. Controller (Patient not taking: Reported on 1/6/2025)    [DISCONTINUED] hydroCHLOROthiazide (HYDRODIURIL) 25 MG tablet Take 1 tablet (25 mg total) by mouth once daily. HOLD UNTIL SUNDAY 12/29. AT THAT TIME, RESUME IF SYSTOLIC BP (THE TOP NUMBER) IS OVER 150 mmHg. (Patient not taking: Reported on 1/6/2025)    [DISCONTINUED] levocetirizine (XYZAL) 5 MG tablet Take 1 tablet night x 5 days, then daily PRN (Patient not taking: Reported on 1/6/2025)    [DISCONTINUED] losartan (COZAAR) 50 MG tablet Take 1 tablet (50 mg total) by mouth once daily. HOLD UNTIL SUNDAY 12/29. AT THAT TIME, RESUME IF SYSTOLIC BP (THE TOP NUMBER) IS OVER 150 mmHg. (Patient not taking: Reported on 1/6/2025)    [DISCONTINUED] metoprolol succinate (TOPROL-XL) 50 MG 24 hr tablet Take 1 tablet (50 mg total) by mouth once daily. HOLD UNTIL SUNDAY 12/29. AT THAT TIME, RESUME IF SYSTOLIC BP (THE TOP NUMBER) IS OVER 150 mmHg. (Patient not taking: Reported on 1/6/2025)     Family History       Problem Relation (Age of Onset)    Breast cancer Mother    Cancer Mother, Father    Lung disease Sister    No Known Problems Brother, Sister          Tobacco Use    Smoking status: Never     Passive exposure: Never    Smokeless tobacco: Never   Substance and Sexual Activity    Alcohol use: Yes     Comment: limited    Drug use: Not on file    Sexual activity: Not on file     Review of Systems   Constitutional:  Positive for activity change, appetite change (decreased), diaphoresis, fatigue and unexpected weight change (decreased). Negative for chills and fever.   HENT:  Negative for trouble swallowing.    Eyes:  Negative for photophobia and visual disturbance.   Respiratory:  Positive for cough. Negative for chest tightness, shortness of breath and wheezing.    Cardiovascular:  Negative for chest pain, palpitations and leg swelling.   Gastrointestinal:  Negative for abdominal pain, constipation, diarrhea, nausea and vomiting.   Genitourinary:  Negative  for dysuria, frequency, hematuria and urgency.   Musculoskeletal:  Negative for arthralgias, back pain and gait problem.   Skin:  Negative for color change and rash.   Neurological:  Positive for dizziness, syncope and light-headedness. Negative for weakness, numbness and headaches.   Psychiatric/Behavioral:  Positive for sleep disturbance. Negative for agitation and confusion. The patient is nervous/anxious.      Objective:     Vital Signs (Most Recent):  Temp: 97.7 °F (36.5 °C) (01/13/25 0630)  Pulse: 77 (01/13/25 1102)  Resp: 16 (01/13/25 1102)  BP: (!) 140/89 (01/13/25 1102)  SpO2: 96 % (01/13/25 1102) Vital Signs (24h Range):  Temp:  [97.5 °F (36.4 °C)-97.7 °F (36.5 °C)] 97.7 °F (36.5 °C)  Pulse:  [63-77] 77  Resp:  [14-18] 16  SpO2:  [95 %-100 %] 96 %  BP: (129-140)/(77-92) 140/89     Weight: 120.9 kg (266 lb 8.6 oz)  Body mass index is 37.17 kg/m².     Physical Exam  Vitals and nursing note reviewed.   Constitutional:       General: He is not in acute distress.     Appearance: He is well-developed. He is not ill-appearing.   HENT:      Head: Normocephalic and atraumatic.      Mouth/Throat:      Mouth: Mucous membranes are dry.   Eyes:      Extraocular Movements: Extraocular movements intact.      Conjunctiva/sclera: Conjunctivae normal.      Pupils: Pupils are equal, round, and reactive to light.   Cardiovascular:      Rate and Rhythm: Normal rate and regular rhythm.      Heart sounds: Normal heart sounds.   Pulmonary:      Effort: Pulmonary effort is normal. No respiratory distress.      Breath sounds: Normal breath sounds. No wheezing.   Abdominal:      General: Bowel sounds are normal. There is no distension.      Palpations: Abdomen is soft.      Tenderness: There is no abdominal tenderness.   Musculoskeletal:         General: No tenderness. Normal range of motion.      Cervical back: Normal range of motion and neck supple.   Skin:     General: Skin is warm and dry.      Capillary Refill: Capillary refill  takes less than 2 seconds.      Findings: No rash.   Neurological:      Mental Status: He is alert and oriented to person, place, and time.      Cranial Nerves: No cranial nerve deficit.      Sensory: No sensory deficit.      Coordination: Coordination normal.   Psychiatric:         Behavior: Behavior normal.         Thought Content: Thought content normal.         Judgment: Judgment normal.              CRANIAL NERVES     CN III, IV, VI   Pupils are equal, round, and reactive to light.       Significant Labs: All pertinent labs within the past 24 hours have been reviewed.    Significant Imaging: I have reviewed all pertinent imaging results/findings within the past 24 hours.  Assessment/Plan:     * Syncope  - Likely multifactorial in nature related to intravascular volume depletion, recent illness, sedating OTC/herbal remedies, etc   - VSSAF   - Labs grossly unremarkable   - EKG without any ST/T wave changes  - Orthostatics negative   - Echo from admission in December reviewed and without valvular abnormalities that could contribute to syncope  - Recently completed a 30-day cardiac event monitor, results pending but EKG and tele throughout admission c/w NSR and story not consistent with cardiac syncope   - Give 1L IVF and monitor for improvement     Pre-diabetes  Hemoglobin A1C   Date Value Ref Range Status   12/26/2024 6.2 (H) 4.0 - 5.6 % Final     Comment:     ADA Screening Guidelines:  5.7-6.4%  Consistent with prediabetes  >or=6.5%  Consistent with diabetes    High levels of fetal hemoglobin interfere with the HbA1C  assay. Heterozygous hemoglobin variants (HbS, HgC, etc)do  not significantly interfere with this assay.   However, presence of multiple variants may affect accuracy.     08/17/2023 5.4 4.0 - 5.6 % Final     Comment:     ADA Screening Guidelines:  5.7-6.4%  Consistent with prediabetes  >or=6.5%  Consistent with diabetes    High levels of fetal hemoglobin interfere with the HbA1C  assay. Heterozygous  hemoglobin variants (HbS, HgC, etc)do  not significantly interfere with this assay.   However, presence of multiple variants may affect accuracy.     - Pt and family have already initiated dietary changes with the assistance of a nutritionist  - Diabetic diet when not NPO     CAD (coronary artery disease)  Patient with known CAD, which is uncontrolled Will continue ASA, Plavix, and Statin and monitor for S/Sx of angina/ACS. Continue to monitor on telemetry.     History of ischemic left PCA stroke  - No acute issues, no focal deficits on exam  - Continue secondary prevention with ASA, statin, and plavix     Severe obesity (BMI 35.0-39.9) with comorbidity  Body mass index is 37.17 kg/m². Morbid obesity complicates all aspects of disease management from diagnostic modalities to treatment. Weight loss encouraged and health benefits explained to patient.    Hyperlipidemia  - Continue statin     Hypertension  - Currently diet controlled    KAUSHAL (obstructive sleep apnea)  - Not on CPAP       VTE Risk Mitigation (From admission, onward)           Ordered     IP VTE HIGH RISK PATIENT  Once         01/13/25 0949                         On 01/13/2025, patient should be placed in hospital observation services under my care in collaboration with Dr. Stevo Pichardo.           Fide Dobbs PA-C  Department of Hospital Medicine  [facility]

## 2025-01-13 NOTE — SUBJECTIVE & OBJECTIVE
Past Medical History:   Diagnosis Date    Allergy     CAD (coronary artery disease)     Eczema     Essential (primary) hypertension     NSTEMI (non-ST elevated myocardial infarction)        Past Surgical History:   Procedure Laterality Date    COLONOSCOPY N/A 2/15/2016    Procedure: COLONOSCOPY;  Surgeon: STONE Sanchez MD;  Location: [facility] ENDO ([address] [place] [address]);  Service: Endoscopy;  Laterality: N/A;  PM Prep    HERNIA REPAIR      LEFT HEART CATHETERIZATION Left 8/17/2023    Procedure: Left heart cath;  Surgeon: Sanchez Burden MD;  Location: [facility] CATH LAB;  Service: Cardiology;  Laterality: Left;       Review of patient's allergies indicates:   Allergen Reactions    Erythromycin     Macrolide antibiotics        No current facility-administered medications on file prior to encounter.     Current Outpatient Medications on File Prior to Encounter   Medication Sig    aspirin 81 MG Chew Take 1 tablet (81 mg total) by mouth once daily.    atorvastatin (LIPITOR) 80 MG tablet Take 1 tablet (80 mg total) by mouth once daily.    azelastine (ASTELIN) 137 mcg (0.1 %) nasal spray 2 sprays (274 mcg total) by Nasal route 2 (two) times daily.    clopidogreL (PLAVIX) 75 mg tablet Take 1 tablet (75 mg total) by mouth once daily. for 20 days    famotidine (PEPCID) 40 MG tablet Take 1 tablet (40 mg total) by mouth once daily.    fluticasone propionate (FLONASE) 50 mcg/actuation nasal spray 2 sprays (100 mcg total) by Each Nostril route 2 (two) times a day.    promethazine-dextromethorphan (PROMETHAZINE-DM) 6.25-15 mg/5 mL Syrp Take 5 mLs by mouth every 4 (four) hours as needed (cough).    [DISCONTINUED] budesonide-formoterol 160-4.5 mcg (SYMBICORT) 160-4.5 mcg/actuation HFAA Inhale 2 puffs into the lungs every 12 (twelve) hours. Controller (Patient not taking: Reported on 1/6/2025)    [DISCONTINUED] hydroCHLOROthiazide (HYDRODIURIL) 25 MG tablet Take 1 tablet (25 mg total) by mouth once daily. HOLD UNTIL SUNDAY 12/29. AT THAT TIME,  RESUME IF SYSTOLIC BP (THE TOP NUMBER) IS OVER 150 mmHg. (Patient not taking: Reported on 1/6/2025)    [DISCONTINUED] levocetirizine (XYZAL) 5 MG tablet Take 1 tablet night x 5 days, then daily PRN (Patient not taking: Reported on 1/6/2025)    [DISCONTINUED] losartan (COZAAR) 50 MG tablet Take 1 tablet (50 mg total) by mouth once daily. HOLD UNTIL SUNDAY 12/29. AT THAT TIME, RESUME IF SYSTOLIC BP (THE TOP NUMBER) IS OVER 150 mmHg. (Patient not taking: Reported on 1/6/2025)    [DISCONTINUED] metoprolol succinate (TOPROL-XL) 50 MG 24 hr tablet Take 1 tablet (50 mg total) by mouth once daily. HOLD UNTIL SUNDAY 12/29. AT THAT TIME, RESUME IF SYSTOLIC BP (THE TOP NUMBER) IS OVER 150 mmHg. (Patient not taking: Reported on 1/6/2025)     Family History       Problem Relation (Age of Onset)    Breast cancer Mother    Cancer Mother, Father    Lung disease Sister    No Known Problems Brother, Sister          Tobacco Use    Smoking status: Never     Passive exposure: Never    Smokeless tobacco: Never   Substance and Sexual Activity    Alcohol use: Yes     Comment: limited    Drug use: Not on file    Sexual activity: Not on file     Review of Systems   Constitutional:  Positive for activity change, appetite change (decreased), diaphoresis, fatigue and unexpected weight change (decreased). Negative for chills and fever.   HENT:  Negative for trouble swallowing.    Eyes:  Negative for photophobia and visual disturbance.   Respiratory:  Positive for cough. Negative for chest tightness, shortness of breath and wheezing.    Cardiovascular:  Negative for chest pain, palpitations and leg swelling.   Gastrointestinal:  Negative for abdominal pain, constipation, diarrhea, nausea and vomiting.   Genitourinary:  Negative for dysuria, frequency, hematuria and urgency.   Musculoskeletal:  Negative for arthralgias, back pain and gait problem.   Skin:  Negative for color change and rash.   Neurological:  Positive for dizziness, syncope and  light-headedness. Negative for weakness, numbness and headaches.   Psychiatric/Behavioral:  Positive for sleep disturbance. Negative for agitation and confusion. The patient is nervous/anxious.      Objective:     Vital Signs (Most Recent):  Temp: 97.7 °F (36.5 °C) (01/13/25 0630)  Pulse: 77 (01/13/25 1102)  Resp: 16 (01/13/25 1102)  BP: (!) 140/89 (01/13/25 1102)  SpO2: 96 % (01/13/25 1102) Vital Signs (24h Range):  Temp:  [97.5 °F (36.4 °C)-97.7 °F (36.5 °C)] 97.7 °F (36.5 °C)  Pulse:  [63-77] 77  Resp:  [14-18] 16  SpO2:  [95 %-100 %] 96 %  BP: (129-140)/(77-92) 140/89     Weight: 120.9 kg (266 lb 8.6 oz)  Body mass index is 37.17 kg/m².     Physical Exam  Vitals and nursing note reviewed.   Constitutional:       General: He is not in acute distress.     Appearance: He is well-developed. He is not ill-appearing.   HENT:      Head: Normocephalic and atraumatic.      Mouth/Throat:      Mouth: Mucous membranes are dry.   Eyes:      Extraocular Movements: Extraocular movements intact.      Conjunctiva/sclera: Conjunctivae normal.      Pupils: Pupils are equal, round, and reactive to light.   Cardiovascular:      Rate and Rhythm: Normal rate and regular rhythm.      Heart sounds: Normal heart sounds.   Pulmonary:      Effort: Pulmonary effort is normal. No respiratory distress.      Breath sounds: Normal breath sounds. No wheezing.   Abdominal:      General: Bowel sounds are normal. There is no distension.      Palpations: Abdomen is soft.      Tenderness: There is no abdominal tenderness.   Musculoskeletal:         General: No tenderness. Normal range of motion.      Cervical back: Normal range of motion and neck supple.   Skin:     General: Skin is warm and dry.      Capillary Refill: Capillary refill takes less than 2 seconds.      Findings: No rash.   Neurological:      Mental Status: He is alert and oriented to person, place, and time.      Cranial Nerves: No cranial nerve deficit.      Sensory: No sensory  deficit.      Coordination: Coordination normal.   Psychiatric:         Behavior: Behavior normal.         Thought Content: Thought content normal.         Judgment: Judgment normal.              CRANIAL NERVES     CN III, IV, VI   Pupils are equal, round, and reactive to light.       Significant Labs: All pertinent labs within the past 24 hours have been reviewed.    Significant Imaging: I have reviewed all pertinent imaging results/findings within the past 24 hours.

## 2025-01-13 NOTE — ED NOTES
Assumed care of patient at this time. Pt placed in hospital gown and currently lying in stretcher. Vital signs are stable, provided pt with warm blanket. Pt denied restroom use. No other complaints from pt at this time. Care ongoing.    LOC: The patient is awake, alert and aware of environment with an appropriate affect, the patient is oriented x 3 and speaking appropriately.   APPEARANCE: Patient appears comfortable and in no acute distress, patient is clean and well groomed.  SKIN: The skin is warm and dry, color consistent with ethnicity, patient has normal skin turgor and moist mucus membranes, skin intact, no breakdown or bruising noted.   MUSCULOSKELETAL: Patient moving all extremities spontaneously, no swelling noted.  RESPIRATORY: Airway is open and patent, respirations are spontaneous, patient has a normal effort and rate, no accessory muscle.  CARDIAC: Pt placed on cardiac monitor. Patient has a normal rate and regular rhythm, no edema noted, capillary refill < 3 seconds.   GASTRO: Soft and non tender to palpation, no distention noted.  : Pt denies any pain or frequency with urination.  NEURO: Pt opens eyes spontaneously, behavior appropriate to situation, follows commands, facial expression symmetrical, bilateral hand grasp equal and even, purposeful motor response noted, normal sensation in all extremities when touched with a finger.

## 2025-01-13 NOTE — DISCHARGE INSTRUCTIONS
As we discussed, your episode of syncope (fainting) was likely multifactorial in nature from dehydration, recent illness/recovering from the flu, decreased oral intake of food from decreased appetite/recent dietary changes, multiple doses of your cough medicine, trying a new (and somewhat sedating) supplement, and these feelings of increased emotional stress. I've included some information below about how to re-hydrate and some great coping techniques for when you're feeling poorly. It has been a pleasure caring for you, and I hope you continue to feel better and stronger every day! Please do not hesitate to reach out to me with any questions or concerns.     Fide Dobbs PA-C  [facility]  [phone]    Dehydration occurs when your body loses more fluids than it takes in, causing it to not have enough water to carry out normal functions. It's important to recognize the signs of dehydration, prevent it, and treat it appropriately. Here's an overview of dehydration and how to manage it:  1. Causes of Dehydration  Inadequate Fluid Intake: Not drinking enough water or fluids.  Excessive Fluid Loss: Through sweating (especially in hot weather or exercise), urination (diuretics or certain medications), vomiting, diarrhea, or fever.  Illness: Conditions such as fever, vomiting, or diarrhea can lead to fluid loss.  2. Symptoms of Dehydration  Mild to Moderate Dehydration:  Thirst  Dry mouth or skin  Fatigue or dizziness  Dark yellow urine  Reduced urine output  Headache  Muscle cramps  Severe Dehydration:  Extreme thirst  Very dry skin  Rapid heartbeat or breathing  Low blood pressure  Sunken eyes  Lack of urine output  Confusion or delirium  Fainting  3. Prevention of Dehydration  Drink Plenty of Fluids: Water is the best choice, but electrolyte drinks (like Pedialyte) can be useful if you're engaging in intense exercise or are ill.  Eat Water-Rich Foods: Foods like watermelon, cucumbers, and oranges  "contain a high amount of water and help hydration.  Adjust Fluid Intake for Activity or Weather: Drink more fluids when exercising, in hot weather, or if you are sick.  Monitor Urine Color: Light yellow or clear urine is a good indicator that you're hydrated, while dark yellow or aden means you need more fluids.  4. Treatment of Dehydration  Mild to Moderate Dehydration: Increase fluid intake gradually. Drink water, oral rehydration solutions (ORS), or electrolyte-rich beverages. Avoid sugary or caffeinated drinks as they can worsen dehydration.  Severe Dehydration: This requires medical attention, and intravenous fluids may be needed to quickly rehydrate the body.      Some tips/tricks for coping with increased emotional stress:   Box breathing, also known as square breathing, is a simple and effective relaxation technique that can help reduce stress, calm the nervous system, and improve focus. It involves breathing in a controlled pattern with equal time for each phase: inhaling, holding the breath, exhaling, and holding again.  Steps for Box Breathing:  Inhale: Breathe in slowly through your nose for a count of 4.  Hold: Hold your breath for a count of 4.  Exhale: Breathe out slowly through your mouth for a count of 4.  Hold: Hold your breath again for a count of 4.  This pattern creates a "box" shape when visualized, with each side being the same length (4 counts in this case, but you can adjust the duration based on your comfort level).  Benefits of Box Breathing:  Reduces Stress: Activates the parasympathetic nervous system (the "rest and digest" response), helping to calm the body and mind.  Improves Focus: Helps increase concentration and clarity by regulating your breath and focusing your mind.  Enhances Relaxation: Can promote a sense of calm, making it useful during moments of anxiety or before high-stress situations.  Improves Respiratory Function: By practicing controlled breathing, box breathing can " help strengthen your diaphragm and improve lung capacity.  How to Practice:  Find a quiet place where you can sit comfortably.  Close your eyes, relax your shoulders, and focus on your breath.  Start with 4-count breathing, but feel free to adjust the duration to suit your needs. For example, try 3 counts or 5 counts if 4 feels too long or too short.  Practice for 3-5 minutes, or longer if you'd like, focusing on maintaining the rhythm and breathing deeply.  Box breathing is a simple yet powerful technique that can be used at any time, whether you're preparing for a stressful event, trying to calm yourself, or simply want to improve your breathing pattern for overall well-being.    Senses Grounding Technique   The 5-4-3-2-1 grounding technique is a sensory exercise that can help you manage anxiety and stress by shifting your focus to the present moment. It involves:   Taking a few deep breaths  Acknowledging five things you can see  Acknowledging four things you can touch  Acknowledging three things you can hear  Acknowledging two things you can smell  Acknowledging one thing you can taste:  Here are some tips for doing the 5-4-3-2-1 grounding technique:   Pay attention to details: Notice the wood grain on a desk or the shape of your fingernails.   Use your senses: If you can't smell anything, you can name two of your favorite smells. If you can't taste anything, you can say your favorite thing to taste.   Close your eyes: If you're doing the technique at night, you can close your eyes and visualize five things.   The 5-4-3-2-1 grounding technique can help you: Improve mindfulness, Regulate emotions, Relieve stress and anxiety, and Improve concentration.

## 2025-01-13 NOTE — ASSESSMENT & PLAN NOTE
- No acute issues, no focal deficits on exam  - Continue secondary prevention with ASA, statin, and plavix

## 2025-01-13 NOTE — TELEPHONE ENCOUNTER
Rhythmstar alert received today for SB with multiple pauses longest 5.7 seconds, Junctional escape rhythm, 35 bpm on 1/13/2025 at 0342 am.      Patient wearing 30 day event monitor for diagnosis Cerebral infarction, unspecified      Received auto-triggered alert notification for Sinus Bradycardia, Multiple Pauses longest 5.7 secs, Junctional Escape Rhythm then return to SB,  35 bpm on 1/13/2025 at 3:42 am.     Pt symptoms:Syncope x 2 this am and went to the ED.  Pt was dehydrated and was given IV fluids then discharged.      Per ED note-->Patient reports that he was laying on an [place] when he went to stand up.  Reported that he had felt bad, and then had fallen.  Prior to fully standing up, continued to not feel well and had another syncopal episode.  Patient has a described myoclonic jerking with the his episode.  EMS was called.  Per patient's wife, he had broken out into a sweat following the incident.     Will cont to monitor until 1/26/2025     Strips placed under this encounter for review.

## 2025-01-17 ENCOUNTER — PATIENT MESSAGE (OUTPATIENT)
Dept: CARDIOLOGY | Facility: CLINIC | Age: 60
End: 2025-01-17

## 2025-01-17 ENCOUNTER — PATIENT MESSAGE (OUTPATIENT)
Dept: INTERNAL MEDICINE | Facility: CLINIC | Age: 60
End: 2025-01-17
Payer: COMMERCIAL

## 2025-01-17 ENCOUNTER — OFFICE VISIT (OUTPATIENT)
Dept: CARDIOLOGY | Facility: CLINIC | Age: 60
End: 2025-01-17
Payer: COMMERCIAL

## 2025-01-17 VITALS
DIASTOLIC BLOOD PRESSURE: 97 MMHG | HEIGHT: 71 IN | BODY MASS INDEX: 35.81 KG/M2 | OXYGEN SATURATION: 95 % | WEIGHT: 255.75 LBS | SYSTOLIC BLOOD PRESSURE: 142 MMHG | HEART RATE: 72 BPM

## 2025-01-17 DIAGNOSIS — I45.5 SINUS ARREST: ICD-10-CM

## 2025-01-17 DIAGNOSIS — I10 HYPERTENSION, UNSPECIFIED TYPE: ICD-10-CM

## 2025-01-17 DIAGNOSIS — E66.01 SEVERE OBESITY (BMI 35.0-39.9) WITH COMORBIDITY: ICD-10-CM

## 2025-01-17 DIAGNOSIS — G47.33 OSA (OBSTRUCTIVE SLEEP APNEA): ICD-10-CM

## 2025-01-17 DIAGNOSIS — E78.5 HYPERLIPIDEMIA, UNSPECIFIED HYPERLIPIDEMIA TYPE: ICD-10-CM

## 2025-01-17 DIAGNOSIS — R55 SYNCOPE, UNSPECIFIED SYNCOPE TYPE: Primary | ICD-10-CM

## 2025-01-17 DIAGNOSIS — Z86.73 HISTORY OF ISCHEMIC LEFT PCA STROKE: ICD-10-CM

## 2025-01-17 PROCEDURE — 1111F DSCHRG MED/CURRENT MED MERGE: CPT | Mod: CPTII,S$GLB,, | Performed by: INTERNAL MEDICINE

## 2025-01-17 PROCEDURE — 3077F SYST BP >= 140 MM HG: CPT | Mod: CPTII,S$GLB,, | Performed by: INTERNAL MEDICINE

## 2025-01-17 PROCEDURE — 99214 OFFICE O/P EST MOD 30 MIN: CPT | Mod: S$GLB,,, | Performed by: INTERNAL MEDICINE

## 2025-01-17 PROCEDURE — 99999 PR PBB SHADOW E&M-EST. PATIENT-LVL V: CPT | Mod: PBBFAC,,, | Performed by: INTERNAL MEDICINE

## 2025-01-17 PROCEDURE — 3008F BODY MASS INDEX DOCD: CPT | Mod: CPTII,S$GLB,, | Performed by: INTERNAL MEDICINE

## 2025-01-17 PROCEDURE — 1159F MED LIST DOCD IN RCRD: CPT | Mod: CPTII,S$GLB,, | Performed by: INTERNAL MEDICINE

## 2025-01-17 PROCEDURE — 4010F ACE/ARB THERAPY RXD/TAKEN: CPT | Mod: CPTII,S$GLB,, | Performed by: INTERNAL MEDICINE

## 2025-01-17 PROCEDURE — 1160F RVW MEDS BY RX/DR IN RCRD: CPT | Mod: CPTII,S$GLB,, | Performed by: INTERNAL MEDICINE

## 2025-01-17 PROCEDURE — 3080F DIAST BP >= 90 MM HG: CPT | Mod: CPTII,S$GLB,, | Performed by: INTERNAL MEDICINE

## 2025-01-17 RX ORDER — VALSARTAN 80 MG/1
80 TABLET ORAL DAILY
Qty: 90 TABLET | Refills: 3 | Status: SHIPPED | OUTPATIENT
Start: 2025-01-17 | End: 2026-01-17

## 2025-01-17 RX ORDER — HYDROCHLOROTHIAZIDE 25 MG/1
25 TABLET ORAL DAILY
Qty: 30 TABLET | Refills: 11 | Status: SHIPPED | OUTPATIENT
Start: 2025-01-17 | End: 2026-01-17

## 2025-01-17 NOTE — PATIENT INSTRUCTIONS
Get a blood test in 1 week.    After 3 weeks, take your blood pressure each morning and evening for 1 week.and then let Dr. Nina know that you have completed taking the blood pressures..

## 2025-01-17 NOTE — PROGRESS NOTES
Chart has been dictated using voice recognition software.  It is not been reviewed carefully for any transcriptional errors due to this technology.   Subjective:   Patient ID:  Naldo Barakat is a 59 y.o. male who presents for follow-up of No chief complaint on file.      HPI:  Patient with obesity, hypertension, GERD, obstructive sleep apnea was admitted 16-18-August-2023 with substernal chest pressure associated with an elevation in troponin. Patient underwent cardiac angiography which showed nonocclusive coronary artery disease. The patient was classified as a type 2 myocardial infarction and treated with good blood pressure control and referral for cardiac rehabilitation.  Patient was last seen by Cardiology in 2023 he has not returned for any follow-up visits.    Patient was hospitalized 26-27 December 2024 symptoms CVA found to have a posterior cerebral artery (PCA) narrowing/distal occlusion.  A transthoracic echocardiogram showed normal ventricular size and systolic function with no diastolic dysfunction.  His left atrium was normal in size with the right atrium is mildly dilated.  A bubble study was negative.  Patient also had a 30 day event monitor that showed an episode on 13-January-2025 of a 5.7 second pause with a junctional escape rhythm before going back into sinus bradycardia.  The report says that he had multiple pauses with the largest being 5.7 seconds.  The patient had been seen in the emergency department for syncope secondary to dehydration.  Patient was sent home from the emergency room.    Patient had flu A at the time he had syncope.  He had taken cough medicine (promethazine- dextromethorphan) and an OTC antianxiety medication (PharmaGABA).  Got up quickly off the couch and felt dizzy and then passed out.  Was out for a minute and woke up with severe diaphoresis and felt better.  Has not had any lightheadedness or syncope since then.    Patient denies any chest discomfort on exertion or at  rest.   Patient denies any dyspnea at rest or on exertion, orthopnea, PND, or edema.  Patient denies any palpitations, lightheadedness, or syncope.     Past Medical History:   Diagnosis Date    Allergy     CAD (coronary artery disease)     Eczema     Essential (primary) hypertension     NSTEMI (non-ST elevated myocardial infarction)        Outpatient Medications Prior to Visit   Medication Sig Dispense Refill    aspirin 81 MG Chew Take 1 tablet (81 mg total) by mouth once daily.      atorvastatin (LIPITOR) 80 MG tablet Take 1 tablet (80 mg total) by mouth once daily. 90 tablet 3    azelastine (ASTELIN) 137 mcg (0.1 %) nasal spray 2 sprays (274 mcg total) by Nasal route 2 (two) times daily. 30 mL 11    famotidine (PEPCID) 40 MG tablet Take 1 tablet (40 mg total) by mouth once daily. 30 tablet 11    fluticasone propionate (FLONASE) 50 mcg/actuation nasal spray 2 sprays (100 mcg total) by Each Nostril route 2 (two) times a day. 16 g 11    promethazine-dextromethorphan (PROMETHAZINE-DM) 6.25-15 mg/5 mL Syrp Take 5 mLs by mouth every 4 (four) hours as needed (cough). 240 mL 0    clopidogreL (PLAVIX) 75 mg tablet Take 1 tablet (75 mg total) by mouth once daily. for 20 days 20 tablet 0     No facility-administered medications prior to visit.       Review of Systems   Respiratory:  Negative for hemoptysis.    Hematologic/Lymphatic: Negative for bleeding problem. Does not bruise/bleed easily.   Gastrointestinal:  Positive for hematochezia (During the flu he had diarrhea which resulted in bleeding x 1 after several day.) and hemorrhoids. Negative for hematemesis.   Genitourinary:  Negative for hematuria.   Neurological:  Negative for focal weakness, numbness and weakness.   Psychiatric/Behavioral:  Positive for memory loss (since CVA).       Objective:   Physical Exam      Lab Results   Component Value Date     01/13/2025    K 3.9 01/13/2025    BUN 9 01/13/2025    CREATININE 1.0 01/13/2025    EGFRNORACEVR >60.0  01/13/2025     (H) 01/13/2025    HGBA1C 6.2 (H) 12/26/2024    CHOL 150 12/26/2024    TRIG 99 12/26/2024    HDL 47 12/26/2024    LDLCALC 83.2 12/26/2024    BNP 23 08/16/2023    HGB 13.2 (L) 01/13/2025    HCT 38.6 (L) 01/13/2025    HCT 45 12/26/2024    WBC 5.92 01/13/2025     01/13/2025    INR 1.0 12/27/2024    INR 1.0 12/26/2024       Assessment:     1. Syncope, unspecified syncope type    2. Hyperlipidemia, unspecified hyperlipidemia type    3. Sinus arrest    4. History of ischemic left PCA stroke    5. Hypertension, unspecified type    6. KAUSHAL (obstructive sleep apnea)    7. Severe obesity (BMI 35.0-39.9) with comorbidity      This patient with MINOCA in the past has had no symptoms ischemic disease or heart failure.  The patient has had unexplained syncope and has had prolonged pauses on his 30 day event monitor.  The patient is on no medication that would exacerbate bradycardia.  Review of the event monitor shows that he likely had sinus arrest with some AV kvng block upon restoration of rhythm.  These episodes were related to his syncopal episodes.  Therefore, it would be prudent for the patient to see a cardiac electrophysiologist.    Additionally, his CVA remains unexplained at this time.  The bubble study was poor therefore he will be sent for a limited echo with a bubble study only to determine whether he has evidence of a septal defect.  His blood pressure is not well controlled.  Therefore, he will be started on valsartan and hydrochlorothiazide as his diastolic blood pressure is moderately elevated.  The patient will have a BNP obtained in approximately a week and should check his blood pressure twice a day for a week after being on this medication for 3 weeks.    Unless there are intervening problems, patient should return for re-evaluation in 3 months  .   Plan:     Diagnoses and all orders for this visit:    Syncope, unspecified syncope type    Hyperlipidemia, unspecified hyperlipidemia  type  -     Lipid Panel; Future    Sinus arrest    History of ischemic left PCA stroke    Hypertension, unspecified type    KAUSHAL (obstructive sleep apnea)    Severe obesity (BMI 35.0-39.9) with comorbidity          Hermilo Nina MD  Consultative Cardiology

## 2025-01-17 NOTE — Clinical Note
Thank you for referring Naldo Barakat for evaluation of syncope. Please see my note for details of this encounter. If you have any questions, please contact me.  Thank you again for the referral.

## 2025-01-23 ENCOUNTER — PATIENT MESSAGE (OUTPATIENT)
Dept: NEUROLOGY | Facility: CLINIC | Age: 60
End: 2025-01-23

## 2025-01-23 ENCOUNTER — OFFICE VISIT (OUTPATIENT)
Dept: NEUROLOGY | Facility: CLINIC | Age: 60
End: 2025-01-23
Payer: COMMERCIAL

## 2025-01-23 DIAGNOSIS — Z86.73 HISTORY OF ISCHEMIC LEFT PCA STROKE: ICD-10-CM

## 2025-01-23 DIAGNOSIS — R41.9 COGNITIVE COMPLAINTS: Primary | ICD-10-CM

## 2025-01-23 PROCEDURE — 96116 NUBHVL XM PHYS/QHP 1ST HR: CPT | Mod: 95,,, | Performed by: PSYCHIATRY & NEUROLOGY

## 2025-01-23 PROCEDURE — 99499 UNLISTED E&M SERVICE: CPT | Mod: 95,,, | Performed by: PSYCHIATRY & NEUROLOGY

## 2025-01-23 NOTE — PROGRESS NOTES
NEUROPSYCHOLOGY CONSULT (TELEHEALTH)  Referral Information  Name: Naldo Barakat  MRN: [id number]  : 1965  Age: 59 y.o.  Race: White  Gender: male  REFERRAL SOURCE: Sonny Cardona MD  DATE CONDUCTED: 2025  SOURCES OF INFORMATION:  The following was gathered from a clinical interview with Mr. Naldo Barakat and review of the available medical records. Mr. Barakat expressed an understanding of the purpose of the evaluation and consented to all procedures. Total licensed billing psychologists professional time including clinical interview, test administration and interpretation of tests administered by the billing psychologist, integration of test results and other clinical data, preparing the final report, and personally reporting results to the patient   Billin -60 minutes  Telemedicine:   The patient location is: Home  The provider location is: Home  The chief complaint/medical necessity leading to consultation/medical necessity is: cognitive decline  Visit type: Virtual visit with audio  Total time spent with patient: 35 minutes  Each patient to whom he or she provides medical services by telemedicine is:  (1) informed of the relationship between the physician and patient and the respective role of any other health care provider with respect to management of the patient; and (2) notified that he or she may decline to receive medical services by telemedicine and may withdraw from such care at any time.  Consent/Emergency Plan: The patient expressed an understanding of the purpose of the evaluation and consented to all procedures. I informed the patient of limits to confidentiality and discussed an emergency plan.    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 He returned to work a week ago, with mild issues observed, but with easy remediation. He otherwise is not noticing any obvious cognitive  "change. We agreed to defer cognitive comprehensive cognitive testing given his very early stage of recovery. I suggested that he contact the clinic in the future if time begins to reveal more apparent cognitive/functional change than he is aware of at this time.     Diagnoses  1. Cognitive complaints  Assessment & Plan:  Follow-up: PRN      2. History of ischemic left PCA stroke    Thank you for allowing me to participate in Dr. Bond care.  If you have any questions, please contact me at [phone].    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 Per initial hospital note on 12/26: "presents today for a headache and blurry vision. Presents today with a head, confusion, and blurry vision after symptoms started yesterday at 3 pm. Said he got blurry vision at that time and a headache. Since then went into work this morning, confusion doing events and tasks that he normally does."    Per 12/27 neurology discharge summary: "Patient was admitted to Vascular Neurology for stroke work-up. CTA with questionable "narrowing or occlusion of the distal left PCA." MRI showed acute infarction involving a portion of the occipital lobe, the tail of the hippocampus, and the left thalamus. TTE with normal EF and no evidence of left atrial enlargement. LDL 83.2 on home Lipitor 40 mg po daily, A1c showing prediabetes (6.2%), and TSH was within normal limits. Cleared by PT/OT/SLP for discharge home with outpatient OT and SLP. At this time, patient is medically stable for discharge with follow-ups with PCP, Vascular Neurology, Neuro-Ophthalmology, and Sleep Medicine. Referrals for 30-day cardiac event monitor (hooked up prior to discharge) and OT driving test also " "ordered."    Dr. Barakat reported persistent right upper quadrant field loss. He remembers not being able to recall his street address on the time of the stroke, but this has resolved. 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 He has noticed some cognitive issues since returning to work. He has not noticed any issues with calculations, but also noted many checks at work if he were to make a mistake. There are many multistep processes associated with his job duties, which has caused some issue. For instance, he can have no issue with most of the many steps and then suddenly come to a step that he has done many times, but he suddenly can't remember how to complete the step. He will check in with his colleagues when this occurs, which quickly resolves the issue. He is not aware of any other cognitive issues as of this moment, either at work or at home. He does not believe his family is noticing any cognitive symptoms. Overall, he feels very grateful that he is not more disabled from the stroke.     Neuropsychiatric Symptoms:  Hallucinations: Denied, but noted that he was seeing flashing lights when closing his eyes the week after the stroke. This has since resolved.   Depression/Labile Mood: Denied, but was feeling stir crazy being home for essentially the past month. He is happy to be back to work. He denied active SI, plan, or intent.   Anxiety: Denied    DAILY FUNCTIONING:  BASIC ADLS:  Feeding: independent, he denied anosmia.   Dressing: independent  Bathing: independent    IADLS:  Support System: Resides with wife, 3 children, brother, and father-in-law.   Appointment Management: independent, no issues.   Medication Compliance: independent, pillbox since a mild MI about 2 years ago. He reported strong adherence.   Financial Management: Most set to autodebit.   Cooking: Wife cooks. " "  Driving: He has driven a few times since the stroke with no issue. No obvious change in navigation.       BRAIN HEALTH RISK FACTORS:  Hearing Loss: Denied  Sleep: He has been sleeping 10-11 hours since he had the over the past few weeks.     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    NEUROIMAGIN2024 Brain MRI: "Acute left PCA infarct involving a portion of the occipital and tail of the hippocampus. Acute left thalamic infarct. No intracranial hemorrhage or mass effect."    2024 Head CT: "The ventricles are normal in size without evidence of hydrocephalus.Large area of hypoattenuation involving the paramedian left occipital lobe extending into posteromedial temporal lobe in keeping with an acute infarction in the PCA distribution.  No significant mass effect or hemorrhage.  Additional small focus of hypoattenuation thought to reflect recent infarction in the left thalamus.  No evidence of remote major vascular distribution infarct.  No acute parenchymal hemorrhage.  No significant intracranial mass effect or midline shift."    SUBSTANCE USE: reports that he has never smoked. He has never been exposed to tobacco smoke. He doesn't drink alcohol. He denied a history of substance abuse.     CURRENT MEDICATIONS:    Current Outpatient Medications:     aspirin 81 MG Chew, Take 1 tablet (81 mg total) by mouth once daily., Disp: , Rfl:     atorvastatin (LIPITOR) 80 MG tablet, Take 1 tablet (80 mg total) by mouth once daily., Disp: 90 tablet, Rfl: 3    azelastine (ASTELIN) 137 mcg (0.1 %) nasal spray, 2 sprays (274 mcg total) by Nasal route 2 (two) times daily., Disp: 30 mL, Rfl: 11    clopidogreL (PLAVIX) 75 mg tablet, Take 1 tablet (75 mg total) by mouth once daily. for 20 days, Disp: 20 tablet, Rfl: 0    famotidine (PEPCID) 40 MG tablet, Take 1 tablet (40 mg " total) by mouth once daily., Disp: 30 tablet, Rfl: 11    fluticasone propionate (FLONASE) 50 mcg/actuation nasal spray, 2 sprays (100 mcg total) by Each Nostril route 2 (two) times a day., Disp: 16 g, Rfl: 11    hydroCHLOROthiazide (HYDRODIURIL) 25 MG tablet, Take 1 tablet (25 mg total) by mouth once daily., Disp: 30 tablet, Rfl: 11    promethazine-dextromethorphan (PROMETHAZINE-DM) 6.25-15 mg/5 mL Syrp, Take 5 mLs by mouth every 4 (four) hours as needed (cough)., Disp: 240 mL, Rfl: 0    valsartan (DIOVAN) 80 MG tablet, Take 1 tablet (80 mg total) by mouth once daily., Disp: 90 tablet, Rfl: 3     FAMILY HISTORY: family history includes Breast cancer in his mother; Cancer in his father and mother; Lung disease in his sister; No Known Problems in his brother and sister.    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     MENTAL STATUS AND OBSERVATIONS:  APPEARANCE: Unable to assess.    ALERTNESS/ORIENTATION: Attentive and alert.   GAIT/MOTOR: Unable to assess.   SENSORY: Unable to assess  SPEECH/LANGUAGE: Normal in rate, rhythm, tone, and volume. Expressive and receptive language were grossly intact.  STATED MOOD/AFFECT: Mood was euthymic  INTERPERSONAL BEHAVIOR: Rapport was quickly and easily established   THOUGHT PROCESSES: Thoughts seemed logical and goal-directed.

## 2025-01-24 ENCOUNTER — TELEPHONE (OUTPATIENT)
Dept: ENDOSCOPY | Facility: HOSPITAL | Age: 60
End: 2025-01-24
Payer: COMMERCIAL

## 2025-01-24 PROBLEM — R41.9 COGNITIVE COMPLAINTS: Status: ACTIVE | Noted: 2025-01-24

## 2025-01-27 ENCOUNTER — OFFICE VISIT (OUTPATIENT)
Dept: NEUROLOGY | Facility: CLINIC | Age: 60
End: 2025-01-27
Payer: COMMERCIAL

## 2025-01-27 VITALS
BODY MASS INDEX: 35.81 KG/M2 | SYSTOLIC BLOOD PRESSURE: 114 MMHG | HEIGHT: 71 IN | WEIGHT: 255.75 LBS | HEART RATE: 59 BPM | DIASTOLIC BLOOD PRESSURE: 83 MMHG

## 2025-01-27 DIAGNOSIS — Z86.73 HISTORY OF ISCHEMIC LEFT PCA STROKE: Primary | ICD-10-CM

## 2025-01-27 DIAGNOSIS — I10 PRIMARY HYPERTENSION: ICD-10-CM

## 2025-01-27 DIAGNOSIS — G47.33 OSA (OBSTRUCTIVE SLEEP APNEA): ICD-10-CM

## 2025-01-27 DIAGNOSIS — I25.10 CORONARY ARTERY DISEASE INVOLVING NATIVE CORONARY ARTERY OF NATIVE HEART WITHOUT ANGINA PECTORIS: Chronic | ICD-10-CM

## 2025-01-27 DIAGNOSIS — R73.03 PRE-DIABETES: ICD-10-CM

## 2025-01-27 DIAGNOSIS — E66.01 SEVERE OBESITY (BMI 35.0-39.9) WITH COMORBIDITY: ICD-10-CM

## 2025-01-27 DIAGNOSIS — H53.461 HOMONYMOUS BILATERAL FIELD DEFECTS, RIGHT SIDE: ICD-10-CM

## 2025-01-27 DIAGNOSIS — E78.2 MIXED HYPERLIPIDEMIA: ICD-10-CM

## 2025-01-27 DIAGNOSIS — I63.532 ACUTE ISCHEMIC LEFT PCA STROKE: ICD-10-CM

## 2025-01-27 PROCEDURE — 99999 PR PBB SHADOW E&M-EST. PATIENT-LVL III: CPT | Mod: PBBFAC,,, | Performed by: STUDENT IN AN ORGANIZED HEALTH CARE EDUCATION/TRAINING PROGRAM

## 2025-01-27 PROCEDURE — 99215 OFFICE O/P EST HI 40 MIN: CPT | Mod: S$GLB,,, | Performed by: STUDENT IN AN ORGANIZED HEALTH CARE EDUCATION/TRAINING PROGRAM

## 2025-01-27 PROCEDURE — 4010F ACE/ARB THERAPY RXD/TAKEN: CPT | Mod: CPTII,S$GLB,, | Performed by: STUDENT IN AN ORGANIZED HEALTH CARE EDUCATION/TRAINING PROGRAM

## 2025-01-27 PROCEDURE — 3008F BODY MASS INDEX DOCD: CPT | Mod: CPTII,S$GLB,, | Performed by: STUDENT IN AN ORGANIZED HEALTH CARE EDUCATION/TRAINING PROGRAM

## 2025-01-27 PROCEDURE — 3074F SYST BP LT 130 MM HG: CPT | Mod: CPTII,S$GLB,, | Performed by: STUDENT IN AN ORGANIZED HEALTH CARE EDUCATION/TRAINING PROGRAM

## 2025-01-27 PROCEDURE — 3079F DIAST BP 80-89 MM HG: CPT | Mod: CPTII,S$GLB,, | Performed by: STUDENT IN AN ORGANIZED HEALTH CARE EDUCATION/TRAINING PROGRAM

## 2025-01-27 NOTE — PROGRESS NOTES
"Vascular Neurology Clinic  Initial Consult    Patient Name: Naldo Barakat  MRN: [id number]    CC: L PCA infarct, ESUS    HPI: Naldo Barakat is a 59 y.o. R-handed male w/ PMH significant for CAD, NSTEMI, Ocular migraines, HLD, HTN, KAUSHAL presenting as referral status-post hospital admission on 12/26/2024.   Inially presented to the ED w/ HA and blurry vision and also confusion/AMS. Noted to have a L PCA infarct on MRI ordered in the ED, acute.    - Day after Jenn he noted that he missed the turn    - Patient was out of window for any acute intervention  Patient was admitted to Vascular Neurology for stroke work-up. CTA with questionable "narrowing or occlusion of the distal left PCA." MRI showed acute infarction involving a portion of the occipital lobe, the tail of the hippocampus, and the left thalamus. TTE with normal EF and no evidence of left atrial enlargement. LDL 83.2 on home Lipitor 40 mg po daily, A1c showing prediabetes (6.2%), and TSH was within normal limits. Cleared by PT/OT/SLP for discharge home with outpatient OT and SLP.   30 day event monitor   - Patient also had a 30 day event monitor that showed an episode on 13-January-2025 of a 5.7 second pause with a junctional escape rhythm before going back into sinus bradycardia.  The report says that he had multiple pauses with the largest being 5.7 seconds - this was reviewed by Dr. Nina, the full read has not been completed    - Flu was on the 6th    - Cardiology follow up on 1/17/2025  KAUSHAL - lost a lot of weight and hasn't felt like he needed the CPAP   - Did notice that with weight gain recently, he noted that he also noted worsening fatigued   - Hasn't started back on CPAP   - Follow up w/ Sleep Medicine on 1/30   Recovering from the flu.  Vision is about the same   - Upper right quadrant     Discharged to Home w/ OP therapyw/ NIHSS of 1, mrS of 2.    I independently viewed and evaluated the cerebrovascular imaging related to this patient as it " pertains to the medical decision making.  Diagnostic Results:  Brain Imaging   12/26/2024 MRI Brain without contrast  Acute left PCA infarct involving a portion of the occipital and tail of the hippocampus. Acute left thalamic infarct. No intracranial hemorrhage or mass effect.      Vessel Imaging   12/26/2024 CTA Stroke MP  - Large acute left PCA distribution infarct.  - Possible occlusion of the distal left PCA.  Otherwise unremarkable CT arteriogram of the head and neck without significant atherosclerotic change.     Cardiac Imaging   12/27/2024 TTE    Left Ventricle: The left ventricle is normal in size. Ventricular mass is normal. Mildly increased wall thickness. Mild septal thickening. There is concentric remodeling. Normal wall motion. There is normal systolic function with a visually estimated ejection fraction of 60 - 65%. Ejection fraction is approximately 65%. There is normal diastolic function.    Right Ventricle: Normal right ventricular cavity size. Wall thickness is normal. Systolic function is normal.    Left Atrium: Normal left atrial size. Agitated saline study of the atrial septum is negative after vasalva maneuver, suggesting absence of intracardiac shunt at the atrial level. No patent foramen ovale confirmed by agitated saline contrast.However, it should be noted that the bubble study was very suboptimal.    Right Atrium: Right atrium is mildly dilated.    Aorta: Aortic root is normal in size measuring 3.35 cm. Ascending aorta is mildly dilated measuring 3.90 cm.    IVC/SVC: Normal venous pressure at 3 mmHg.       Relevant Lab work   Recent Labs   Lab 12/26/24  1122   Hemoglobin A1C 6.2 H   LDL Cholesterol 83.2   HDL 47   Triglycerides 99   Cholesterol 150         NIH Stroke Scale:  Interval: baseline (upon arrival/admit)  Level of Consciousness: 0 - alert  LOC Questions: 0 - answers both correctly  LOC Commands: 0 - performs both correctly  Best Gaze: 0 - normal  Visual: 1 - partial  hemianopia  Facial Palsy: 0 - normal  Motor Left Arm: 0 - no drift  Motor Right Arm: 0 - no drift  Motor Left Le - no drift  Motor Right Le - no drift  Limb Ataxia: 0 - absent  Sensory: 0 - normal  Best Language: 0 - no aphasia  Dysarthria: 0 - normal articulation  Extinction and Inattention: 0 - no neglect  NIH Stroke Scale Total: 1         Review of Systems:  General: No fevers, chills  Eyes: No changes in vision  ENT: No changes in hearing  Respiratory: No SOB  CV: No chest pain, palpitations  GI: No diarrhea, blood in stool  Urinary: No dysuria, hematuria  Skin: No rashes  Neurological: No weakness, confusion  Psychiatric: No auditory nor visual hallucinations      Past Medical History  Past Medical History:   Diagnosis Date    Allergy     CAD (coronary artery disease)     Eczema     Essential (primary) hypertension     NSTEMI (non-ST elevated myocardial infarction)        Medications    Current Outpatient Medications:     aspirin 81 MG Chew, Take 1 tablet (81 mg total) by mouth once daily., Disp: , Rfl:     atorvastatin (LIPITOR) 80 MG tablet, Take 1 tablet (80 mg total) by mouth once daily., Disp: 90 tablet, Rfl: 3    azelastine (ASTELIN) 137 mcg (0.1 %) nasal spray, 2 sprays (274 mcg total) by Nasal route 2 (two) times daily., Disp: 30 mL, Rfl: 11    famotidine (PEPCID) 40 MG tablet, Take 1 tablet (40 mg total) by mouth once daily., Disp: 30 tablet, Rfl: 11    fluticasone propionate (FLONASE) 50 mcg/actuation nasal spray, 2 sprays (100 mcg total) by Each Nostril route 2 (two) times a day., Disp: 16 g, Rfl: 11    hydroCHLOROthiazide (HYDRODIURIL) 25 MG tablet, Take 1 tablet (25 mg total) by mouth once daily., Disp: 30 tablet, Rfl: 11    promethazine-dextromethorphan (PROMETHAZINE-DM) 6.25-15 mg/5 mL Syrp, Take 5 mLs by mouth every 4 (four) hours as needed (cough)., Disp: 240 mL, Rfl: 0    valsartan (DIOVAN) 80 MG tablet, Take 1 tablet (80 mg total) by mouth once daily., Disp: 90 tablet, Rfl: 3     benzonatate (TESSALON) 100 MG capsule, Take 1 capsule (100 mg total) by mouth 3 (three) times daily as needed for Cough., Disp: 15 capsule, Rfl: 0    clopidogreL (PLAVIX) 75 mg tablet, Take 1 tablet (75 mg total) by mouth once daily. for 20 days (Patient not taking: Reported on 1/29/2025), Disp: 20 tablet, Rfl: 0  Any other notable medications as documented in HPI    Allergies  Review of patient's allergies indicates:   Allergen Reactions    Erythromycin     Macrolide antibiotics        Social History  Social History     Socioeconomic History    Marital status:     Number of children: 2   Tobacco Use    Smoking status: Never     Passive exposure: Never    Smokeless tobacco: Never   Substance and Sexual Activity    Alcohol use: Yes     Comment: limited     Social Drivers of Health     Financial Resource Strain: Low Risk  (12/27/2024)    Overall Financial Resource Strain (CARDIA)     Difficulty of Paying Living Expenses: Not hard at all   Food Insecurity: No Food Insecurity (12/27/2024)    Hunger Vital Sign     Worried About Running Out of Food in the Last Year: Never true     Ran Out of Food in the Last Year: Never true   Transportation Needs: No Transportation Needs (12/27/2024)    TRANSPORTATION NEEDS     Transportation : No   Physical Activity: Sufficiently Active (12/27/2024)    Exercise Vital Sign     Days of Exercise per Week: 4 days     Minutes of Exercise per Session: 40 min   Stress: No Stress Concern Present (12/27/2024)    [employer] - Occupational Stress Questionnaire     Feeling of Stress : Only a little   Housing Stability: Low Risk  (12/27/2024)    Housing Stability Vital Sign     Unable to Pay for Housing in the Last Year: No     Homeless in the Last Year: No     Any other notable Social History as documented in HPI.    Family History  Family History   Problem Relation Name Age of Onset    Cancer Mother          lung    Breast cancer Mother      Cancer Father        "   liver    Lung disease Sister      No Known Problems Brother      No Known Problems Sister      Asthma Neg Hx       Any other notable FMH as documented in HPI.    Physical Exam  /83 (BP Location: Right arm, Patient Position: Sitting)   Pulse (!) 59   Ht 5' 11" (1.803 m)   Wt 116 kg (255 lb 11.7 oz)   BMI 35.67 kg/m²     General: Well-developed, well-groomed. No apparent distress  HENT: Normocephalic, atraumatic.    Cardiovascular: Regular rate and rhythm  Chest: No audible wheezes, stridor, ronchi appreciated.  Musculoskeletal: No peripheral edema    Neurologic Exam: The patient is awake, alert and oriented to person, place, time and situation. Attentive, vigilant during exam. Language is fluent. Naming & repetition intact, +2-step commands.  Fund of knowledge is appropriate. Well organized thoughts.      Cranial nerves:   CN II:  R superior quadrantanopsia . Pupils are 4 mm and briskly reactive to light.  CN III, IV, VI: EOMI, no nystagmus, no ptosis  CN V: Facial sensation is intact in all 3 divisions bilaterally.  CN VII: Face is symmetric with normal eye closure and smile.  CN VIII: Hearing is normal bilaterally  CN IX, X: Palate elevates symmetrically. Phonation is normal.  CN XI: Head turning and shoulder shrug are intact  CN XII: Tongue is midline with normal movements and no atrophy.    Motor examination of all extremities demonstrates normal bulk and tone in all four limbs. There are no atrophy or fasciculations.      Left Right   Left Right   Deltoid 5/5 5/5  Hip Flexion 5/5 5/5   Biceps 5/5 5/5  Hip Extension 5/5 5/5   Triceps 5/5 5/5  Knee Flexion 5/5 5/5   Wrist Ext 5/5 5/5  Knee Extension 5/5 5/5   Finger Abd 5/5 5/5  Ankle dorsiflex 5/5 5/5       Ankle plantar flex 5/5 5/5       Sensory examination is normal light touch in BUE and BLE.      Deep tendon reflexes  Negative Lopez's    Gait: Normal tandem, and casual gait.     Coordination: No dysmetria with finger-to-nose      Assessment " and Plan  L PCA territory infarct, ESUS - R superior quadrantanopsia, residual deficit   Completed DAPT - ASA 81 mg only at this time  Agree with repeat ECHO w/ bubble due to poor quality fo study in the past to r/o a PFO  May require an ILR if no evidence of PFO to monitor for paroxysmal Afib   KAUSHAL - discussed need for CPAP compliance and return to Sleep Medicine to re-establishing care  Discussed lifestyle modifications and that ultimately that will help w/ reducing future stroke risk, although likely stroke risk related to  rather than ESUS-related events   Pending Neuro-Ophthalmology evaluation   Discussed OT driving evaluation to ensure     Recommendations:   Antiplatelet/Anticoagulation: ASA 81 mg QD  Lipid Management: Atorvastatin 80 mg QHS (long-term goal LDL < 70).   - Monitoring for liver dysfunction and myopathy is suggested for statins. To be addressed by PCP  Diabetes: Target hemoglobin A1c <7%, measured 2X/year or quarterly if not meeting goals  Hypertension: Long term goal is normotension w/ target BP of less than 130/80 mmHg  Sleep: Denies any symptoms of KAUSHAL. Consider Sleep Medicine follow up in the future if suspicion for KAUSHAL occurs.   Smoking: Goal is smoking cessation and encouragement not to start smoking in the future.   Diet: Discussed Mediterranean Diet recommendations (Adopted from Fatuma et al, NEJ, 2018.)  - Eat primarily plant-based foods, such as fruits and vegetables, whole grains, legumes (beans) and nuts  - Limit refined carbohydrates (white pasta, bread, rice).  - Replace butter with healthy fats such as olive oil.  - Use herbs and spices instead of salt to flavor foods.  - Limit red meat and processed meats to no more than a few times a month.  - Avoid sugary sodas, bakery goods, and sweets.  - Eat fish and poultry at least twice a week.  - Get plenty of exercise (150 minutes per week).    RTC in PRN    Problem List Items Addressed This Visit          Neuro    History of  ischemic left PCA stroke - Primary    Relevant Orders    Echo Saline Bubble? Yes    Acute ischemic left PCA stroke       Ophtho    Homonymous bilateral field defects, right side       Cardiac/Vascular    CAD (coronary artery disease) (Chronic)    Primary hypertension    Mixed hyperlipidemia       Endocrine    Severe obesity (BMI 35.0-39.9) with comorbidity    Pre-diabetes       Other    KAUSHAL (obstructive sleep apnea)           Umm Mckeon MD  Vascular Neurology  [facility]  [address] [place] [address]

## 2025-01-28 ENCOUNTER — PATIENT MESSAGE (OUTPATIENT)
Dept: INTERNAL MEDICINE | Facility: CLINIC | Age: 60
End: 2025-01-28
Payer: COMMERCIAL

## 2025-01-28 ENCOUNTER — E-CONSULT (OUTPATIENT)
Dept: CARDIOLOGY | Facility: CLINIC | Age: 60
End: 2025-01-28
Payer: COMMERCIAL

## 2025-01-28 DIAGNOSIS — Z86.73 HISTORY OF ISCHEMIC LEFT PCA STROKE: Primary | ICD-10-CM

## 2025-01-28 DIAGNOSIS — I25.10 CORONARY ARTERY DISEASE, UNSPECIFIED VESSEL OR LESION TYPE, UNSPECIFIED WHETHER ANGINA PRESENT, UNSPECIFIED WHETHER NATIVE OR TRANSPLANTED HEART: Chronic | ICD-10-CM

## 2025-01-28 DIAGNOSIS — I10 PRIMARY HYPERTENSION: ICD-10-CM

## 2025-01-28 NOTE — CONSULTS
[facility] - Cardiology - 3rd Fl  Response for E-Consult     Patient Name: Naldo Barakat  MRN: [id number]  Primary Care Provider: Anthony Wyman MD   Requesting Provider: Catrina Garcia NP  E-Consult to General Cardiology  Consult performed by: Hermilo Nina MD  Consult ordered by: Catrina Garcia NP          Recommendation: His estimated risk with the proposed surgery/procedure for an adverse perioperative cardiac outcome (MI, pulmonary edema, ventricular fibrillation or primary cardiac arrest, and complete heart block) is 6.6% (3.9%-10.3%) and for an adverse 30 day cardiac outcome (myocardial infarction, cardiac arrest, or death) is 10.0% (95% CI 8.2%-12.6%) (Revised Cardiac Risk Index [RCRI] Score = 2  based on the following risk factors: History of ischemic heart disease and History of cerebrovascular disease). (Perioperative outcomes - Jake at al Circ 1999; 100: 5413-2756; 30 day outcomes - Lola et al. Can J Cardiol 2017; 33:17-32). The patient is at a intermediate  cardiovascular risk and a low bleeding risk for the proposed low risk procedure. (2022 ESC Guidelines on cardiovascular assessment and management of patients undergoing non-cardiac surgery  Heart Journal 2022;43:4267-4229)  No further testing needed prior to the procedure.  Patient is in optimal medical condition for the procedure.         Additional future steps to consider: None    Total time of Consultation: 5 minute    I did not speak to the requesting provider verbally about this.     *This eConsult is based on the clinical data available to me and is furnished without benefit of a physical examination. The eConsult will need to be interpreted in light of any clinical issues or changes in patient status not available to me at the time of filing this eConsults. Significant changes in patient condition or level of acuity should result in immediate formal consultation and reevaluation. Please alert me if you have further  questions.    Thank you for this eConsult referral.     Hermilo Nina MD  [facility] - Cardiology - [facility]

## 2025-01-28 NOTE — PROGRESS NOTES
Medical history  Coronary artery disease withNon STEMI August 2023  Hypertension   Hyperlipidemia  Pre diabetes     Medication list per med card     Social history   Tobacco use none    59-year-old male   Follow-up visit      He was last seen by me January 6.  Follow-up after having a left posterior occipital CVA.  At that visit he was actually diagnosed with influenza    January 13th he presented to the emergency room because of a syncopal event.  Around for a.m. he is lying on the sofa in his living room.  He hurts something got up then upon doing that felt weak and dizzy and passed out on the sofa.  He tried to get up again in his same thing occurred.  Actually after wakening from the 2nd event he had a big sweat and felt better.  He is not aware palpitations or shortness for breath.    Evaluated in emergency room which she was given IV fluids testing was unrevealing    In the interim he has followed up with cardiology.  In regard to the CVA.  He is going to have a repeat 2D echo with bubble study to evaluate for PFO.  In addition he also had a 30 day monitor.  There was a report of a sinus pause with AV kvng disruption around the time of the syncopal event.  Because of such he is referral to arrhythmia.  He personally feels that this was related to dehydration    Another issue row he has a follow-up visit with sleep clinic.  That has been diagnosed with sleep apnea.  About a year ago he stopped the use of the CPAP because of losing weight but in his room he has gained weight   He has gone back to using in his CPAP.    Presently that has no chest pain palpitations shortness for breath or abdominal pain.  Regular bowel function urination    The only other ongoing issue is that of a persistent cough.  Which I initially saw him in November 4.  However he feels that it is doing better with the use of Astelin Flonase and also Pepcid at night.  He had a CT scan in November which was unrevealing in his recent chest  x-ray was normal.    Examination   Weight 113   BMI 34.81   Pulse 60   Blood pressure 128/72   Chest clear breath sounds  Heart regular rate rhythm   Abdominal exam is bowel sounds soft nontender    Impression   Cerebrovascular event, left posterior cervical artery infarct   Sinus pause questionable significance  Obstructive sleep apnea with the uses CPAP   Hypertension   Hyperlipidemia   Pre diabetes   Allergic cough      Plan  He will get back with me after having a 2D echo with Doppler which is on February 3rd as well as the appointment with the sleep clinic.  He may need to have new CPAP supplies.  Regarding in his cough for about a couple weeks can give him a trial of Tessalon Perles to take at night

## 2025-01-29 ENCOUNTER — OFFICE VISIT (OUTPATIENT)
Dept: INTERNAL MEDICINE | Facility: CLINIC | Age: 60
End: 2025-01-29
Payer: COMMERCIAL

## 2025-01-29 VITALS
BODY MASS INDEX: 34.94 KG/M2 | DIASTOLIC BLOOD PRESSURE: 72 MMHG | HEIGHT: 71 IN | SYSTOLIC BLOOD PRESSURE: 124 MMHG | HEART RATE: 61 BPM | OXYGEN SATURATION: 98 % | WEIGHT: 249.56 LBS

## 2025-01-29 DIAGNOSIS — R55 SYNCOPE: ICD-10-CM

## 2025-01-29 PROCEDURE — 3008F BODY MASS INDEX DOCD: CPT | Mod: CPTII,S$GLB,, | Performed by: INTERNAL MEDICINE

## 2025-01-29 PROCEDURE — 1159F MED LIST DOCD IN RCRD: CPT | Mod: CPTII,S$GLB,, | Performed by: INTERNAL MEDICINE

## 2025-01-29 PROCEDURE — 99999 PR PBB SHADOW E&M-EST. PATIENT-LVL IV: CPT | Mod: PBBFAC,,, | Performed by: INTERNAL MEDICINE

## 2025-01-29 PROCEDURE — 3074F SYST BP LT 130 MM HG: CPT | Mod: CPTII,S$GLB,, | Performed by: INTERNAL MEDICINE

## 2025-01-29 PROCEDURE — 99214 OFFICE O/P EST MOD 30 MIN: CPT | Mod: S$GLB,,, | Performed by: INTERNAL MEDICINE

## 2025-01-29 PROCEDURE — 3078F DIAST BP <80 MM HG: CPT | Mod: CPTII,S$GLB,, | Performed by: INTERNAL MEDICINE

## 2025-01-29 PROCEDURE — 4010F ACE/ARB THERAPY RXD/TAKEN: CPT | Mod: CPTII,S$GLB,, | Performed by: INTERNAL MEDICINE

## 2025-01-29 PROCEDURE — 1160F RVW MEDS BY RX/DR IN RCRD: CPT | Mod: CPTII,S$GLB,, | Performed by: INTERNAL MEDICINE

## 2025-01-29 RX ORDER — BENZONATATE 100 MG/1
100 CAPSULE ORAL 3 TIMES DAILY PRN
Qty: 15 CAPSULE | Refills: 0 | Status: SHIPPED | OUTPATIENT
Start: 2025-01-29

## 2025-01-29 RX ORDER — TIRZEPATIDE 2.5 MG/.5ML
2.5 INJECTION, SOLUTION SUBCUTANEOUS
Status: CANCELLED | OUTPATIENT
Start: 2025-01-29

## 2025-01-30 ENCOUNTER — TELEPHONE (OUTPATIENT)
Dept: SLEEP MEDICINE | Facility: CLINIC | Age: 60
End: 2025-01-30

## 2025-01-31 PROBLEM — I63.532 ACUTE ISCHEMIC LEFT PCA STROKE: Status: ACTIVE | Noted: 2025-01-31

## 2025-02-03 ENCOUNTER — HOSPITAL ENCOUNTER (OUTPATIENT)
Dept: CARDIOLOGY | Facility: HOSPITAL | Age: 60
Discharge: HOME | End: 2025-02-03
Attending: STUDENT IN AN ORGANIZED HEALTH CARE EDUCATION/TRAINING PROGRAM
Payer: COMMERCIAL

## 2025-02-03 VITALS
BODY MASS INDEX: 34.86 KG/M2 | HEART RATE: 68 BPM | HEIGHT: 71 IN | DIASTOLIC BLOOD PRESSURE: 72 MMHG | WEIGHT: 249 LBS | SYSTOLIC BLOOD PRESSURE: 130 MMHG

## 2025-02-03 DIAGNOSIS — Z86.73 HISTORY OF ISCHEMIC LEFT PCA STROKE: ICD-10-CM

## 2025-02-03 PROCEDURE — 93306 TTE W/DOPPLER COMPLETE: CPT

## 2025-02-03 PROCEDURE — 93306 TTE W/DOPPLER COMPLETE: CPT | Mod: 26,,, | Performed by: INTERNAL MEDICINE

## 2025-02-03 NOTE — PROGRESS NOTES
Procedure explained. 22 g sl started in right forearm for bubble study by sergio barba rn. Bubble study done x 4 with and without valsalva via right forearm sl.  Tolerated well. Sl d/donald after. Pressure applied

## 2025-02-04 ENCOUNTER — OFFICE VISIT (OUTPATIENT)
Dept: DERMATOLOGY | Facility: CLINIC | Age: 60
End: 2025-02-04
Payer: COMMERCIAL

## 2025-02-04 DIAGNOSIS — B35.1 ONYCHOMYCOSIS: ICD-10-CM

## 2025-02-04 DIAGNOSIS — D18.01 CHERRY ANGIOMA: ICD-10-CM

## 2025-02-04 DIAGNOSIS — L98.9 SKIN LESION OF RIGHT ARM: ICD-10-CM

## 2025-02-04 DIAGNOSIS — Z12.83 SCREENING EXAM FOR SKIN CANCER: ICD-10-CM

## 2025-02-04 DIAGNOSIS — D22.9 MULTIPLE BENIGN NEVI: Primary | ICD-10-CM

## 2025-02-04 DIAGNOSIS — D23.9 DERMATOFIBROMA: ICD-10-CM

## 2025-02-04 LAB
ASCENDING AORTA: 3.44 CM
AV AREA BY CONTINUOUS VTI: 3.7 CM2
AV INDEX (PROSTH): 0.89
AV LVOT MEAN GRADIENT: 2 MMHG
AV LVOT PEAK GRADIENT: 4 MMHG
AV MEAN GRADIENT: 3 MMHG
AV PEAK GRADIENT: 6 MMHG
AV VALVE AREA BY VELOCITY RATIO: 3.5 CM²
AV VALVE AREA: 3.7 CM2
AV VELOCITY RATIO: 0.83
BSA FOR ECHO PROCEDURE: 2.38 M2
CV ECHO LV RWT: 0.42 CM
DOP CALC AO PEAK VEL: 1.2 M/S
DOP CALC AO VTI: 24.7 CM
DOP CALC LVOT AREA: 4.2 CM2
DOP CALC LVOT DIAMETER: 2.3 CM
DOP CALC LVOT PEAK VEL: 1 M/S
DOP CALC LVOT STROKE VOLUME: 91.8 CM3
DOP CALCLVOT PEAK VEL VTI: 22.1 CM
E WAVE DECELERATION TIME: 337 MS
E/A RATIO: 0.99
E/E' RATIO: 6 M/S
ECHO EF ESTIMATED: 70 %
ECHO LV POSTERIOR WALL: 1 CM (ref 0.6–1.1)
EJECTION FRACTION: 60 %
FRACTIONAL SHORTENING: 39.6 % (ref 28–44)
INTERVENTRICULAR SEPTUM: 1 CM (ref 0.6–1.1)
LA MAJOR: 5.5 CM
LA MINOR: 5.5 CM
LA WIDTH: 4.1 CM
LEFT ATRIUM SIZE: 3.6 CM
LEFT ATRIUM VOLUME INDEX MOD: 29 ML/M2
LEFT ATRIUM VOLUME INDEX: 30 ML/M2
LEFT ATRIUM VOLUME MOD: 67 ML
LEFT ATRIUM VOLUME: 69 CM3
LEFT INTERNAL DIMENSION IN SYSTOLE: 2.9 CM (ref 2.1–4)
LEFT VENTRICLE DIASTOLIC VOLUME INDEX: 45.4 ML/M2
LEFT VENTRICLE DIASTOLIC VOLUME: 104.88 ML
LEFT VENTRICLE MASS INDEX: 73.7 G/M2
LEFT VENTRICLE SYSTOLIC VOLUME INDEX: 13.4 ML/M2
LEFT VENTRICLE SYSTOLIC VOLUME: 31.04 ML
LEFT VENTRICULAR INTERNAL DIMENSION IN DIASTOLE: 4.8 CM (ref 3.5–6)
LEFT VENTRICULAR MASS: 170.2 G
LV LATERAL E/E' RATIO: 4.6
LV SEPTAL E/E' RATIO: 8.6
MV PEAK A VEL: 0.7 M/S
MV PEAK E VEL: 0.69 M/S
OHS CV RV/LV RATIO: 0.65 CM
PISA TR MAX VEL: 2.1 M/S
RA MAJOR: 5.26 CM
RA PRESSURE ESTIMATED: 3 MMHG
RA WIDTH: 3.22 CM
RIGHT ATRIAL AREA: 16.4 CM2
RIGHT VENTRICLE DIASTOLIC BASEL DIMENSION: 3.1 CM
RV TB RVSP: 5 MMHG
RV TISSUE DOPPLER FREE WALL SYSTOLIC VELOCITY 1 (APICAL 4 CHAMBER VIEW): 18.32 CM/S
SINUS: 3.78 CM
STJ: 3 CM
TDI LATERAL: 0.15 M/S
TDI SEPTAL: 0.08 M/S
TDI: 0.12 M/S
TR MAX PG: 18 MMHG
TRICUSPID ANNULAR PLANE SYSTOLIC EXCURSION: 2.19 CM
TV PEAK GRADIENT: 18 MMHG
TV REST PULMONARY ARTERY PRESSURE: 21 MMHG
Z-SCORE OF LEFT VENTRICULAR DIMENSION IN END DIASTOLE: -6.38
Z-SCORE OF LEFT VENTRICULAR DIMENSION IN END SYSTOLE: -5.03

## 2025-02-04 PROCEDURE — 4010F ACE/ARB THERAPY RXD/TAKEN: CPT | Mod: CPTII,S$GLB,, | Performed by: STUDENT IN AN ORGANIZED HEALTH CARE EDUCATION/TRAINING PROGRAM

## 2025-02-04 PROCEDURE — 99999 PR PBB SHADOW E&M-EST. PATIENT-LVL III: CPT | Mod: PBBFAC,,, | Performed by: STUDENT IN AN ORGANIZED HEALTH CARE EDUCATION/TRAINING PROGRAM

## 2025-02-04 PROCEDURE — 1159F MED LIST DOCD IN RCRD: CPT | Mod: CPTII,S$GLB,, | Performed by: STUDENT IN AN ORGANIZED HEALTH CARE EDUCATION/TRAINING PROGRAM

## 2025-02-04 PROCEDURE — 99203 OFFICE O/P NEW LOW 30 MIN: CPT | Mod: S$GLB,,, | Performed by: STUDENT IN AN ORGANIZED HEALTH CARE EDUCATION/TRAINING PROGRAM

## 2025-02-04 PROCEDURE — 1160F RVW MEDS BY RX/DR IN RCRD: CPT | Mod: CPTII,S$GLB,, | Performed by: STUDENT IN AN ORGANIZED HEALTH CARE EDUCATION/TRAINING PROGRAM

## 2025-02-04 NOTE — PROGRESS NOTES
"  Subjective:      Patient ID:  Naldo Barakat is a 59 y.o. male who presents for   Chief Complaint   Patient presents with    Skin Check     TBSE      Naldo Barakat is a 59 y.o. male who presents for: FBSE screening exam for skin cancer.    New patient: 1st skin check     LV 2016 with Dr. Colin for scabies    The patient has the following lesions of concern:  Location: right forearm   Duration: 3 yrs   Symptoms: recurrent and raised   Relieving factors/Previous treatments: none     Patient with new area of concern:   Location: Left arm   Duration: noticed 1 wks   Symptoms: asymptomatic   Previous treatments: none   Pt notes the lesion originally looked like a "white head" pimple     Pertinent history:  History of blistering sunburns: No  History of tanning bed use: No  Family history of melanoma: No  Personal history of mole removal: No  Personal history of skin cancer: No    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 Negative for daily sunscreen use and recent sunburn.   Hematologic/Lymphatic: Does not bruise/bleed easily.       Objective:   Physical Exam   Constitutional: He appears well-developed and well-nourished. No distress.   Neurological: He is alert and oriented to person, place, and time. He is not disoriented.   Psychiatric: He has a normal mood and affect.   Skin:   Areas Examined (abnormalities noted in diagram):   Scalp / Hair Palpated and Inspected  Head / Face Inspection Performed  Neck Inspection Performed  Chest / Axilla Inspection Performed  Abdomen Inspection Performed  Genitals / Buttocks / Groin Inspection Performed  Back Inspection Performed  RUE Inspected  LUE Inspection Performed  RLE Inspected  LLE Inspection Performed  Nails and Digits Inspection Performed                Diagram Legend     Erythematous scaling macule/papule c/w actinic keratosis       Vascular papule c/w " angioma      Pigmented verrucoid papule/plaque c/w seborrheic keratosis      Yellow umbilicated papule c/w sebaceous hyperplasia      Irregularly shaped tan macule c/w lentigo     1-2 mm smooth white papules consistent with Milia      Movable subcutaneous cyst with punctum c/w epidermal inclusion cyst      Subcutaneous movable cyst c/w pilar cyst      Firm pink to brown papule c/w dermatofibroma      Pedunculated fleshy papule(s) c/w skin tag(s)      Evenly pigmented macule c/w junctional nevus     Mildly variegated pigmented, slightly irregular-bordered macule c/w mildly atypical nevus      Flesh colored to evenly pigmented papule c/w intradermal nevus       Pink pearly papule/plaque c/w basal cell carcinoma      Erythematous hyperkeratotic cursted plaque c/w SCC      Surgical scar with no sign of skin cancer recurrence      Open and closed comedones      Inflammatory papules and pustules      Verrucoid papule consistent consistent with wart     Erythematous eczematous patches and plaques     Dystrophic onycholytic nail with subungual debris c/w onychomycosis     Umbilicated papule    Erythematous-base heme-crusted tan verrucoid plaque consistent with inflamed seborrheic keratosis     Erythematous Silvery Scaling Plaque c/w Psoriasis     See annotation      Assessment / Plan:      Multiple benign nevi  Examined with dermoscopy, reassurance  I reviewed the ABCDE's of melanoma, ugly duckling sign and importance of monthly self-exams in mirror.     Hubbard angioma  This is a benign vascular lesion. Reassurance given. No treatment required.     Dermatofibroma  This is a benign scar-like lesion secondary to minor trauma. No treatment required.     Onychomycosis  Discussed etiology, treatment deferred    Screening exam for skin cancer  Total body skin examination performed today including at least 12 points as noted in physical examination. No lesions suspicious for malignancy noted.    Recommend daily sun  protection/avoidance, use of at least SPF 30, broad spectrum sunscreen (OTC drug), skin self examinations, and routine physician surveillance to optimize early detection    RTC 1 year, sooner prn

## 2025-02-07 ENCOUNTER — PATIENT MESSAGE (OUTPATIENT)
Dept: INTERNAL MEDICINE | Facility: CLINIC | Age: 60
End: 2025-02-07
Payer: COMMERCIAL

## 2025-02-10 NOTE — TELEPHONE ENCOUNTER
Spoke to pt and let him know that I f/u with Dr. Wyman to ensure he has the paperwork; let pt know I will call at the end of the day to ensure it was completed

## 2025-02-11 ENCOUNTER — TELEPHONE (OUTPATIENT)
Dept: INTERNAL MEDICINE | Facility: CLINIC | Age: 60
End: 2025-02-11
Payer: COMMERCIAL

## 2025-02-11 NOTE — TELEPHONE ENCOUNTER
Spoke to pt and let him know [employer] forms were faxed over; Also helped him schedule an annual in a month per his request and he would like Dr. Wyman to put in any labs he needs to complete beforehand

## 2025-02-20 ENCOUNTER — PATIENT MESSAGE (OUTPATIENT)
Dept: CARDIOLOGY | Facility: CLINIC | Age: 60
End: 2025-02-20
Payer: COMMERCIAL

## 2025-02-20 DIAGNOSIS — E78.2 MIXED HYPERLIPIDEMIA: Primary | ICD-10-CM

## 2025-03-27 ENCOUNTER — TELEPHONE (OUTPATIENT)
Dept: INTERNAL MEDICINE | Facility: CLINIC | Age: 60
End: 2025-03-27
Payer: COMMERCIAL

## 2025-04-01 ENCOUNTER — OFFICE VISIT (OUTPATIENT)
Dept: INTERNAL MEDICINE | Facility: CLINIC | Age: 60
End: 2025-04-01
Payer: COMMERCIAL

## 2025-04-01 VITALS
SYSTOLIC BLOOD PRESSURE: 122 MMHG | HEIGHT: 71 IN | HEART RATE: 66 BPM | BODY MASS INDEX: 34.64 KG/M2 | WEIGHT: 247.44 LBS | DIASTOLIC BLOOD PRESSURE: 84 MMHG | OXYGEN SATURATION: 96 %

## 2025-04-01 DIAGNOSIS — E66.811 CLASS 1 OBESITY WITH SERIOUS COMORBIDITY AND BODY MASS INDEX (BMI) OF 34.0 TO 34.9 IN ADULT, UNSPECIFIED OBESITY TYPE: ICD-10-CM

## 2025-04-01 DIAGNOSIS — Z00.00 ANNUAL PHYSICAL EXAM: Primary | ICD-10-CM

## 2025-04-01 DIAGNOSIS — E78.5 HYPERLIPIDEMIA, UNSPECIFIED HYPERLIPIDEMIA TYPE: ICD-10-CM

## 2025-04-01 DIAGNOSIS — I25.10 CORONARY ARTERY DISEASE INVOLVING NATIVE CORONARY ARTERY OF NATIVE HEART WITHOUT ANGINA PECTORIS: ICD-10-CM

## 2025-04-01 DIAGNOSIS — Z12.5 PROSTATE CANCER SCREENING: ICD-10-CM

## 2025-04-01 DIAGNOSIS — I10 PRIMARY HYPERTENSION: ICD-10-CM

## 2025-04-01 DIAGNOSIS — I63.89 CEREBROVASCULAR ACCIDENT (CVA) DUE TO OTHER MECHANISM: ICD-10-CM

## 2025-04-01 DIAGNOSIS — R05.3 CHRONIC COUGH: ICD-10-CM

## 2025-04-01 DIAGNOSIS — R73.03 PRE-DIABETES: ICD-10-CM

## 2025-04-01 PROBLEM — I63.9 CEREBROVASCULAR ACCIDENT: Status: ACTIVE | Noted: 2025-01-31

## 2025-04-01 PROCEDURE — 99999 PR PBB SHADOW E&M-EST. PATIENT-LVL IV: CPT | Mod: PBBFAC,,, | Performed by: INTERNAL MEDICINE

## 2025-04-01 NOTE — PROGRESS NOTES
Medical history  Coronary artery disease withNon STEMI 2023  Hypertension   Hyperlipidemia  Pre diabetes  left posterior occipital CVA   GERD with LPR  Obstructive sleep apnea uses CPAP            SOCIAL HISTORY:    Tobacco use, none.    Alcohol use, very limited.    , has   three children.    Exercise, walking routine       FAMILY HISTORY:    Mother , had lung cancer and breast cancer.    Father  , liver cancer.    1 sisters and one brother, no health conditions.  1 sister  with bronchiectasis, lung cancer     MEDICATIONS:     Atorvastatin 80 mg   Metoprolol XL 50 mg  Losartan 50 mg   Hydrochlorothiazide 25 mg   Pepcid 40 mg q.h.s.  Astelin nasal spray   Flonase nasal spray    SCREENING:  Colonoscopy 02/15/2016 was normal.      59-year-old male   Annual visit    Recent in his visits was a follow-up having a left posterior cerebral artery CVA that has manifested by visual feel impairment and confusion.  Evaluation was unrevealing 2D echo bubble study did not show any PFO.  That has followed up with Cardiology, Neurology, neuropsychology.  At 1st he was having memory issues but that has that has now dramatically improved in his back to normal  Has  residual right field visual impairment.    Review of symptoms   Reports no chest pain, palpitations, shortness for breath, abdominal pain.  Regular bowel function.  No difficulty urinating nocturia x1.  No indigestion heartburn.  No arthralgias headaches    That has back into an exercise program.  Trying to be more attentive to diet.  Also back to using CPAP on a regular basis    Also he has not had much of a cough which that has been chronic in his allergic appears to be doing well with the use of Pepcid Flonase Astelin    Examination   Weight 147 lb   BMI 34.51  Pulse 68   Blood pressure 120/72  HEENT exam, no abnormal findings   Neck, no thyromegaly   Chest, clear breath sounds   Heart, regular rate rhythm no murmurs or gallops    Abdominal exam, nontender soft no hepatosplenomegaly abdominal masses bruits  Pulses 2+ carotid pulses no bruits 2+ dorsalis pedal pulses  Extremities, no edema   Lymph gland no palpable adenopathy   Skin, no gross abnormal findings were recently had dermatology appointment   Musculoskeletal no gross abnormal findings   Genitalia no scrotal masses no hernias rectal stool is brown prostate is mildly moderately enlarged    Impression   General exam  Hypertension   Hyperlipidemia   Coronary artery disease  Cerebrovascular disease with left posterior cerebral artery infarct  Obstructive sleep apnea uses CPAP  Class 1 obesity with comorbidities  Prostate cancer screening    Plan   Routine labs  Shingles vaccine  Continue with attention to appropriate diet physical activity

## 2025-04-03 ENCOUNTER — LAB VISIT (OUTPATIENT)
Dept: LAB | Facility: HOSPITAL | Age: 60
End: 2025-04-03
Attending: INTERNAL MEDICINE
Payer: COMMERCIAL

## 2025-04-03 DIAGNOSIS — Z12.5 PROSTATE CANCER SCREENING: ICD-10-CM

## 2025-04-03 DIAGNOSIS — I63.89 CEREBROVASCULAR ACCIDENT (CVA) DUE TO OTHER MECHANISM: ICD-10-CM

## 2025-04-03 DIAGNOSIS — R05.3 CHRONIC COUGH: ICD-10-CM

## 2025-04-03 DIAGNOSIS — I25.10 CORONARY ARTERY DISEASE INVOLVING NATIVE CORONARY ARTERY OF NATIVE HEART WITHOUT ANGINA PECTORIS: ICD-10-CM

## 2025-04-03 DIAGNOSIS — E66.811 CLASS 1 OBESITY WITH SERIOUS COMORBIDITY AND BODY MASS INDEX (BMI) OF 34.0 TO 34.9 IN ADULT, UNSPECIFIED OBESITY TYPE: ICD-10-CM

## 2025-04-03 DIAGNOSIS — R73.03 PRE-DIABETES: ICD-10-CM

## 2025-04-03 DIAGNOSIS — I10 PRIMARY HYPERTENSION: ICD-10-CM

## 2025-04-03 DIAGNOSIS — Z00.00 ANNUAL PHYSICAL EXAM: ICD-10-CM

## 2025-04-03 DIAGNOSIS — E78.5 HYPERLIPIDEMIA, UNSPECIFIED HYPERLIPIDEMIA TYPE: ICD-10-CM

## 2025-04-03 LAB
ABSOLUTE EOSINOPHIL (OHS): 0.05 K/UL
ABSOLUTE MONOCYTE (OHS): 0.4 K/UL (ref 0.3–1)
ABSOLUTE NEUTROPHIL COUNT (OHS): 2.41 K/UL (ref 1.8–7.7)
ALBUMIN SERPL BCP-MCNC: 3.8 G/DL (ref 3.5–5.2)
ALP SERPL-CCNC: 94 UNIT/L (ref 40–150)
ALT SERPL W/O P-5'-P-CCNC: 40 UNIT/L (ref 10–44)
ANION GAP (OHS): 7 MMOL/L (ref 8–16)
AST SERPL-CCNC: 42 UNIT/L (ref 11–45)
BASOPHILS # BLD AUTO: 0.04 K/UL
BASOPHILS NFR BLD AUTO: 1 %
BILIRUB SERPL-MCNC: 0.8 MG/DL (ref 0.1–1)
BUN SERPL-MCNC: 18 MG/DL (ref 8–23)
CALCIUM SERPL-MCNC: 8.9 MG/DL (ref 8.7–10.5)
CHLORIDE SERPL-SCNC: 108 MMOL/L (ref 95–110)
CHOLEST SERPL-MCNC: 108 MG/DL (ref 120–199)
CHOLEST/HDLC SERPL: 2.6 {RATIO} (ref 2–5)
CO2 SERPL-SCNC: 24 MMOL/L (ref 23–29)
CREAT SERPL-MCNC: 1.2 MG/DL (ref 0.5–1.4)
EAG (OHS): 123 MG/DL (ref 68–131)
ERYTHROCYTE [DISTWIDTH] IN BLOOD BY AUTOMATED COUNT: 12.6 % (ref 11.5–14.5)
GFR SERPLBLD CREATININE-BSD FMLA CKD-EPI: >60 ML/MIN/1.73/M2
GLUCOSE SERPL-MCNC: 111 MG/DL (ref 70–110)
HBA1C MFR BLD: 5.9 % (ref 4–5.6)
HCT VFR BLD AUTO: 40.9 % (ref 40–54)
HDLC SERPL-MCNC: 42 MG/DL (ref 40–75)
HDLC SERPL: 38.9 % (ref 20–50)
HGB BLD-MCNC: 13.9 GM/DL (ref 14–18)
IMM GRANULOCYTES # BLD AUTO: 0.02 K/UL (ref 0–0.04)
IMM GRANULOCYTES NFR BLD AUTO: 0.5 % (ref 0–0.5)
LDLC SERPL CALC-MCNC: 55.4 MG/DL (ref 63–159)
LYMPHOCYTES # BLD AUTO: 1.22 K/UL (ref 1–4.8)
MAGNESIUM SERPL-MCNC: 2.1 MG/DL (ref 1.6–2.6)
MCH RBC QN AUTO: 32.6 PG (ref 27–31)
MCHC RBC AUTO-ENTMCNC: 34 G/DL (ref 32–36)
MCV RBC AUTO: 96 FL (ref 82–98)
NONHDLC SERPL-MCNC: 66 MG/DL
NUCLEATED RBC (/100WBC) (OHS): 0 /100 WBC
PLATELET # BLD AUTO: 250 K/UL (ref 150–450)
PMV BLD AUTO: 9.7 FL (ref 9.2–12.9)
POTASSIUM SERPL-SCNC: 3.5 MMOL/L (ref 3.5–5.1)
PROT SERPL-MCNC: 6.9 GM/DL (ref 6–8.4)
PSA SERPL-MCNC: 0.97 NG/ML
RBC # BLD AUTO: 4.27 M/UL (ref 4.6–6.2)
RELATIVE EOSINOPHIL (OHS): 1.2 %
RELATIVE LYMPHOCYTE (OHS): 29.5 % (ref 18–48)
RELATIVE MONOCYTE (OHS): 9.7 % (ref 4–15)
RELATIVE NEUTROPHIL (OHS): 58.1 % (ref 38–73)
SODIUM SERPL-SCNC: 139 MMOL/L (ref 136–145)
TESTOST SERPL-MCNC: 567 NG/DL (ref 304–1227)
TRIGL SERPL-MCNC: 53 MG/DL (ref 30–150)
TSH SERPL-ACNC: 1.09 UIU/ML (ref 0.4–4)
WBC # BLD AUTO: 4.14 K/UL (ref 3.9–12.7)

## 2025-04-03 PROCEDURE — 85025 COMPLETE CBC W/AUTO DIFF WBC: CPT

## 2025-04-03 PROCEDURE — 84443 ASSAY THYROID STIM HORMONE: CPT

## 2025-04-03 PROCEDURE — 83735 ASSAY OF MAGNESIUM: CPT

## 2025-04-03 PROCEDURE — 84478 ASSAY OF TRIGLYCERIDES: CPT

## 2025-04-03 PROCEDURE — 84403 ASSAY OF TOTAL TESTOSTERONE: CPT

## 2025-04-03 PROCEDURE — 84153 ASSAY OF PSA TOTAL: CPT

## 2025-04-03 PROCEDURE — 83036 HEMOGLOBIN GLYCOSYLATED A1C: CPT

## 2025-04-03 PROCEDURE — 84075 ASSAY ALKALINE PHOSPHATASE: CPT

## 2025-04-03 PROCEDURE — 36415 COLL VENOUS BLD VENIPUNCTURE: CPT

## 2025-04-09 ENCOUNTER — PATIENT MESSAGE (OUTPATIENT)
Dept: INTERNAL MEDICINE | Facility: CLINIC | Age: 60
End: 2025-04-09
Payer: COMMERCIAL

## 2025-04-21 ENCOUNTER — PATIENT MESSAGE (OUTPATIENT)
Dept: ADMINISTRATIVE | Facility: HOSPITAL | Age: 60
End: 2025-04-21
Payer: COMMERCIAL

## 2025-04-28 ENCOUNTER — PATIENT OUTREACH (OUTPATIENT)
Dept: ADMINISTRATIVE | Facility: HOSPITAL | Age: 60
End: 2025-04-28
Payer: COMMERCIAL

## 2025-04-28 NOTE — PROGRESS NOTES
Chart reviewed and updated. Reconciled immunizations, health maintenance and care everywhere.  Currently in the process of scheduling colonoscopy.      Alice Haas, [facility]  Panel Care Coordinator  [facility]  [phone]  For Anthony Wyman MD

## 2025-05-01 ENCOUNTER — OFFICE VISIT (OUTPATIENT)
Dept: SLEEP MEDICINE | Facility: CLINIC | Age: 60
End: 2025-05-01
Attending: PSYCHIATRY & NEUROLOGY
Payer: COMMERCIAL

## 2025-05-01 DIAGNOSIS — G47.33 OSA (OBSTRUCTIVE SLEEP APNEA): Primary | ICD-10-CM

## 2025-05-01 NOTE — Clinical Note
Naldo Vela is and [facility] patient with DS2 Gastelum machine - could you please give me an access to see his APAP via Care ?  Thank you!

## 2025-05-01 NOTE — PROGRESS NOTES
Naldo Barakat is a 59 y.o. male is here to be evaluated for a sleep disorder; referred by Self, Millicent.    Previously diagnosed with obstructive sleep apnea in [facility] in 2019 through his PMD referral (never been seen in the sleep clinic); CPAP titration done that your recommended CPAP settings of 7-8 cm of water; has been using CPAP compliantly with a nasal mask until he has lost 40 lb (from 260 down to 220 lb), and was now no longer snoring and no longer feeling tired, so he stopped using CPAP for a while.  He has gradually regained weight up to 250 lb, and the symptoms of obstructive sleep apnea have recurred, with snoring and certain sleep positions, and excessive daytime sleepiness in the afternoon.  He also had a CVAlast year, and was recommended to get re-evaluated for sleep apnea.  He resumed using his dream station 2 Gastelum machine recently (provided by [facility], I will ask them to g me access, as I am currently not connected).    He is wondering, if Gastelum dream station 2 is considered safe to use.  Does not mind to keep using his machine until he loses weight again, as he has been once again benefitting from its use in terms of sleep continuity and excessive daytime sleepiness.    Denies breakthrough snoring.    Using his CPAP with a triangular nasal mask and the regular hose.      Naldo Barakat  denies symptoms concerning for parasomnia except for occasional somniloquy.  The patient  denies auxiliary symptoms of narcolepsy including sleep onset paralysis, hypnagogic hallucinations, sleep attacks and cataplexy.    Naldo Barakat denied symptoms concerning for RLS; nocturnal leg movements have not been noticed.   The patient does not experience sleep related leg cramps.        to sleep  disorders taken currently: -  Previous  medications taken  for sleep disorders:  -    Sleep studies  PSG 2019-    Respiratory: Moderate to loud snoring was present. There was significant KAUSHAL (obstructive  sleep apnea) based on AHI (apnea hypopnea index) criteria. The overall AHI was 10.8 with an oxygen mono of 91.0%. The supine AHI was 23.3 and the REM AHI was 3.5. The patient did not qualify for a split night study due to an insufficient number of events in the first half of the study.   Motor movement / Parasomnia: There were occasional limb movements   CPAP 2019 - 7-8 cm H2O CPAP recommend          5/1/2025     7:54 AM 1/30/2025     8:30 AM   EPWORTH SLEEPINESS SCALE TOTAL SCORE    Total score 11  14        Patient-reported             5/1/2025     7:54 AM   EPWORTH SLEEPINESS SCALE   Sitting and reading 2   Watching TV 2   Sitting, inactive in a public place (e.g. a theatre or a meeting) 1   As a passenger in a car for an hour without a break 2   Lying down to rest in the afternoon when circumstances permit 3   Sitting and talking to someone 0   Sitting quietly after a lunch without alcohol 1   In a car, while stopped for a few minutes in traffic 0   Total score 11        Patient-reported           4/1/2025   PHQ-9 Depression Patient Health Questionnaire   Over the last two weeks how often have you been bothered by little interest or pleasure in doing things 0   Over the last two weeks how often have you been bothered by feeling down, depressed or hopeless 0           No data to display                    5/1/2025     7:54 AM   EPWORTH SLEEPINESS SCALE   Sitting and reading 2   Watching TV 2   Sitting, inactive in a public place (e.g. a theatre or a meeting) 1   As a passenger in a car for an hour without a break 2   Lying down to rest in the afternoon when circumstances permit 3   Sitting and talking to someone 0   Sitting quietly after a lunch without alcohol 1   In a car, while stopped for a few minutes in traffic 0   Total score 11        Patient-reported         5/1/2025     8:06 AM   Sleep Clinic New Patient   What time do you go to bed on a week day? (Give a range) 8:30-10   What time do you go to bed on  a day off? (Give a range) 9:00-11:00   How long does it take you to fall asleep? (Give a range) <5 min   On average, how many times per night do you wake up? 2-3   How long does it take you to fall back into sleep? (Give a range) <5 min   What time do you wake up to start your day on a week day? (Give a range) 5-7   What time do you wake up to start your day on a day off? (Give a range) 7-8:30   What time do you get out of bed? (Give a range) 5-8:30   On average, how many hours do you sleep? 9   On average, how many naps do you take per day? 1   Rate your sleep quality from 0 to 5 (0-poor, 5-great). 5   Have you experienced:  Weight gain?   How much weight have you lost or gained (in lbs.) in the last year? 2lbs but gained up to 15 lbs this winter   On average, how many times per night do you go to the bathroom?  2-3   Have you ever had a sleep study/CPAP machine/surgery for sleep apnea? Yes   Have you ever had a CPAP machine for sleep apnea? Yes   Have you ever had surgery for sleep apnea? No           5/1/2025     8:06 AM   Sleep Clinic ROS    Difficulty breathing through the nose?  No   Sore throat or dry mouth in the morning? No   Irregular or very fast heart beat?  No   Shortness of breath?  No   Acid reflux? Sometimes   Body aches and pains?  No   Morning headaches? No   Dizziness? Sometimes   Mood changes?  No   Do you exercise?  Yes   Do you feel like moving your legs a lot?  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/2024    Hypokalemia 12/27/2024    CAD (coronary artery disease) 12/27/2024    Pre-diabetes 12/27/2024    Syncope 01/13/2025    Sinus arrest 01/17/2025    Cognitive complaints 01/24/2025    Cerebrovascular accident 01/31/2025     Resolved Ambulatory Problems     Diagnosis Date Noted    NSTEMI (non-ST elevated myocardial infarction) 08/17/2023    Subsequent non-ST elevation myocardial infarction (NSTEMI) within 4 weeks of initial infarction 08/24/2023     Past Medical History:   Diagnosis Date    Allergy     Eczema     Essential (primary) hypertension                 PAST SURGICAL HISTORY:    Past Surgical History:   Procedure Laterality Date    COLONOSCOPY N/A 2/15/2016    Procedure: COLONOSCOPY;  Surgeon: STONE Sanchez MD;  Location: [facility] ENDO ([address] [place] [address]);  Service: Endoscopy;  Laterality: N/A;  PM Prep    HERNIA REPAIR      LEFT HEART CATHETERIZATION Left 8/17/2023    Procedure: Left heart cath;  Surgeon: Sanchez Burden MD;  Location: [facility] CATH LAB;  Service: Cardiology;  Laterality: Left;         FAMILY HISTORY:                Family History   Problem Relation Name Age of Onset    Cancer Mother          lung    Breast cancer Mother      Cancer Father          liver    Cancer Sister          cancer    Lung disease Sister      No Known Problems Sister      No Known Problems Brother      Asthma Neg Hx      Melanoma Neg Hx         SOCIAL HISTORY:          Tobacco: Tobacco Use History[1]    alcohol use:    Social History     Substance and Sexual Activity   Alcohol Use Yes    Comment: limited                   ALLERGIES:    Review of patient's allergies indicates:   Allergen Reactions    Erythromycin     Macrolide antibiotics        CURRENT MEDICATIONS:  Current Medications[2]                     PHYSICAL EXAM:  There were no vitals taken for this visit.      ASSESSMENT:    1. Sleep Apnea NEC. Previously diagnosed KAUSHAL. Resuming to use APAP after  a break since he has regained weight.  . The patient has medical co-morbidities of CAD, hypertension, hyperlipidemia, and history of stroke  which can be worsened by KAUSHAL. This warrants treatment.  Benefiting from CPAP use in terms of sleep continuity and daytime sleepiness.                         Assessment & Plan  KAUSHAL (obstructive sleep apnea)    Orders:    CPAP/BIPAP SUPPLIES        PLAN:    Will check with our DME, if they could add me to Care  to be able to see his APAP via the portal -> I will message Naldo Barakat with update once I get an access to his machine  Naldo Barakat was explained that DS2 machines are considered safe, as they are using silicone and not polyurethane sound abatement foam.   Will reorder supplies via [facility] with a triangular nasal mask and a regular hose  Naldo Barakat will let me know if he would like to get retested for KAUSHAL severity, or if he would like to upgrade to Resmed APAP.       During our discussion today, we talked about the etiology of  KAUSHAL as well as the potential ramifications of untreated sleep apnea, which could include daytime sleepiness, hypertension, heart disease and/or stroke.  We discussed potential treatment options, which could include weight loss, body positioning, continuous positive airway pressure (CPAP), or referral for surgical consideration. Meanwhile, he  is urged to avoid supine sleep, weight gain and alcoholic beverages since all of these can worsen KAUSHAL.     The patient was given open opportunity to ask questions and/or express concerns about treatment plan. Two point patient identifier confirmed.       Precautions: The patient was advised to abstain from driving should he feel sleepy or drowsy.    Follow up: MD after the sleep study has been completed.     ESS (Dalton Sleepiness Scale) and other sleep medicine related questionnaires were reviewed with the patient.      46-minute visit. >50% spent counseling patient and coordination of  care.  The patient was  cautioned against drowsy driving.                [1]   Social History  Tobacco Use   Smoking Status Never    Passive exposure: Never   Smokeless Tobacco Never   [2]   Current Outpatient Medications   Medication Sig Dispense Refill    aspirin 81 MG Chew Take 1 tablet (81 mg total) by mouth once daily.      atorvastatin (LIPITOR) 80 MG tablet Take 1 tablet (80 mg total) by mouth once daily. 90 tablet 3    azelastine (ASTELIN) 137 mcg (0.1 %) nasal spray 2 sprays (274 mcg total) by Nasal route 2 (two) times daily. 30 mL 11    benzonatate (TESSALON) 100 MG capsule Take 1 capsule (100 mg total) by mouth 3 (three) times daily as needed for Cough. 15 capsule 0    clopidogreL (PLAVIX) 75 mg tablet Take 1 tablet (75 mg total) by mouth once daily. for 20 days (Patient not taking: Reported on 1/29/2025) 20 tablet 0    famotidine (PEPCID) 40 MG tablet Take 1 tablet (40 mg total) by mouth once daily. 30 tablet 11    fluticasone propionate (FLONASE) 50 mcg/actuation nasal spray 2 sprays (100 mcg total) by Each Nostril route 2 (two) times a day. 16 g 11    hydroCHLOROthiazide (HYDRODIURIL) 25 MG tablet Take 1 tablet (25 mg total) by mouth once daily. 30 tablet 11    promethazine-dextromethorphan (PROMETHAZINE-DM) 6.25-15 mg/5 mL Syrp Take 5 mLs by mouth every 4 (four) hours as needed (cough). 240 mL 0    valsartan (DIOVAN) 80 MG tablet Take 1 tablet (80 mg total) by mouth once daily. 90 tablet 3     No current facility-administered medications for this visit.

## 2025-05-21 ENCOUNTER — TELEPHONE (OUTPATIENT)
Dept: SLEEP MEDICINE | Facility: CLINIC | Age: 60
End: 2025-05-21
Payer: COMMERCIAL

## 2025-05-22 NOTE — TELEPHONE ENCOUNTER
Message  Received: Today  Fátima Mays Liudmila, MD  I've linked you to pt's CO.          Previous Messages       ----- Message -----  From: Harika Vergara MD  Sent: 5/1/2025   8:55 AM CDT  To: Fátima Mays    Hi Naldo Shine is and [facility] patient with DS2 Gastelum machine - could you please give me an access to see his APAP via Care ?    Thank you!

## 2025-07-14 ENCOUNTER — NURSE TRIAGE (OUTPATIENT)
Dept: ADMINISTRATIVE | Facility: CLINIC | Age: 60
End: 2025-07-14
Payer: COMMERCIAL

## 2025-07-14 NOTE — TELEPHONE ENCOUNTER
Patient calling to ask if he can take the norco that was prescribed by his dentist today. He is requesting to get the advice of his neurologist since he had a stroke 12/2024. He is also asking if he should continue to  avoid NSAIDs. Will route message to clinic to address tomorrow.   Reason for Disposition   [1] Caller requesting NON-URGENT health information AND [2] PCP's office is the best resource    Protocols used: Information Only Call - No Triage-A-

## 2025-08-08 ENCOUNTER — PATIENT MESSAGE (OUTPATIENT)
Dept: INTERNAL MEDICINE | Facility: CLINIC | Age: 60
End: 2025-08-08
Payer: COMMERCIAL

## 2025-08-08 RX ORDER — PROPRANOLOL HYDROCHLORIDE 10 MG/1
10 TABLET ORAL 3 TIMES DAILY
Qty: 30 TABLET | Refills: 0 | Status: SHIPPED | OUTPATIENT
Start: 2025-08-08

## 2025-08-08 NOTE — TELEPHONE ENCOUNTER
Pt states that daughter tested positive for COVID    Would like to know if rx can be sent for him in case he starts to have symptoms    LOV with Anthony Wyman MD , 4/1/2025

## 2025-08-09 ENCOUNTER — PATIENT MESSAGE (OUTPATIENT)
Dept: INTERNAL MEDICINE | Facility: CLINIC | Age: 60
End: 2025-08-09
Payer: COMMERCIAL

## (undated) DEVICE — OMNIPAQUE 300MG 50ML

## (undated) DEVICE — SUT VICRYL 3-0 27 SH

## (undated) DEVICE — TRAY MINOR GEN SURG

## (undated) DEVICE — DRAPE ANGIO BRACH 38X44IN

## (undated) DEVICE — SEE MEDLINE ITEM 157117

## (undated) DEVICE — TRANSDUCER ADULT DISP

## (undated) DEVICE — SPIKE CONTRAST CONTROLLER

## (undated) DEVICE — SUT MCRYL PLUS 4-0 PS2 27IN

## (undated) DEVICE — DRAIN PENROSE XRAY 12 X 1/4 ST

## (undated) DEVICE — SUT VICRYL PLUS 3-0 SH 18IN

## (undated) DEVICE — SEE MEDLINE ITEM 157148

## (undated) DEVICE — GOWN SURGICAL X-LARGE

## (undated) DEVICE — PAD DEFIB CADENCE ADULT R2

## (undated) DEVICE — DRESSING ADH FILM TELFA 2X3IN

## (undated) DEVICE — GUIDEWIRE EMERALD 150CM PTFE

## (undated) DEVICE — STRIP STERI REIN CLSR 1/2X2IN

## (undated) DEVICE — NDL HYPO REG 25G X 1 1/2

## (undated) DEVICE — TRAY CATH LAB OMC

## (undated) DEVICE — KIT GLIDESHEATH SLEND 6FR 10CM

## (undated) DEVICE — SUT 2-0 VICRYL / CT-1

## (undated) DEVICE — APPLICATOR CHLORAPREP ORN 26ML

## (undated) DEVICE — KIT PROBE COVER WITH GEL

## (undated) DEVICE — DRESSING TELFA STRL 4X3 LF

## (undated) DEVICE — DRESSING TRANS 4X4 TEGADERM

## (undated) DEVICE — SEE MEDLINE ITEM 146417

## (undated) DEVICE — ADHESIVE MASTISOL VIAL 48/BX

## (undated) DEVICE — CATH RADIAL TIG 6FR 4.0 110CM

## (undated) DEVICE — HEMOSTAT VASC BAND REG 24CM

## (undated) DEVICE — KIT CUSTOM MANIFOLD

## (undated) DEVICE — TEGADERM IV

## (undated) DEVICE — ELECTRODE REM PLYHSV RETURN 9